# Patient Record
Sex: MALE | Race: WHITE | Employment: OTHER | URBAN - METROPOLITAN AREA
[De-identification: names, ages, dates, MRNs, and addresses within clinical notes are randomized per-mention and may not be internally consistent; named-entity substitution may affect disease eponyms.]

---

## 2017-11-27 ENCOUNTER — HOSPITAL ENCOUNTER (INPATIENT)
Age: 72
LOS: 1 days | Discharge: HOME | DRG: 065 | End: 2017-12-01
Attending: EMERGENCY MEDICINE | Admitting: INTERNAL MEDICINE
Payer: MEDICARE

## 2017-11-27 ENCOUNTER — APPOINTMENT (OUTPATIENT)
Dept: GENERAL RADIOLOGY | Age: 72
DRG: 065 | End: 2017-11-27
Attending: EMERGENCY MEDICINE
Payer: MEDICARE

## 2017-11-27 ENCOUNTER — APPOINTMENT (OUTPATIENT)
Dept: CT IMAGING | Age: 72
DRG: 065 | End: 2017-11-27
Attending: EMERGENCY MEDICINE
Payer: MEDICARE

## 2017-11-27 DIAGNOSIS — G45.9 TRANSIENT CEREBRAL ISCHEMIA, UNSPECIFIED TYPE: Primary | ICD-10-CM

## 2017-11-27 PROBLEM — I10 HTN (HYPERTENSION): Chronic | Status: ACTIVE | Noted: 2017-11-27

## 2017-11-27 PROBLEM — Z79.01 CHRONIC ANTICOAGULATION: Chronic | Status: ACTIVE | Noted: 2017-11-27

## 2017-11-27 PROBLEM — R79.89 ELEVATED SERUM CREATININE: Status: ACTIVE | Noted: 2017-11-27

## 2017-11-27 PROBLEM — I50.9 CHF (CONGESTIVE HEART FAILURE) (HCC): Chronic | Status: ACTIVE | Noted: 2017-11-27

## 2017-11-27 LAB
ALBUMIN SERPL-MCNC: 3.9 G/DL (ref 3.5–5)
ALBUMIN/GLOB SERPL: 1.1 {RATIO} (ref 1.1–2.2)
ALP SERPL-CCNC: 99 U/L (ref 45–117)
ALT SERPL-CCNC: 25 U/L (ref 12–78)
ANION GAP SERPL CALC-SCNC: 8 MMOL/L (ref 5–15)
APTT PPP: 51.1 SEC (ref 22.1–32.5)
AST SERPL-CCNC: 20 U/L (ref 15–37)
BASOPHILS # BLD: 0 K/UL (ref 0–0.1)
BASOPHILS NFR BLD: 0 % (ref 0–1)
BILIRUB SERPL-MCNC: 0.8 MG/DL (ref 0.2–1)
BUN SERPL-MCNC: 20 MG/DL (ref 6–20)
BUN/CREAT SERPL: 14 (ref 12–20)
CALCIUM SERPL-MCNC: 9.7 MG/DL (ref 8.5–10.1)
CHLORIDE SERPL-SCNC: 105 MMOL/L (ref 97–108)
CO2 SERPL-SCNC: 29 MMOL/L (ref 21–32)
CREAT SERPL-MCNC: 1.4 MG/DL (ref 0.7–1.3)
DIFFERENTIAL METHOD BLD: ABNORMAL
EOSINOPHIL # BLD: 0 K/UL (ref 0–0.4)
EOSINOPHIL NFR BLD: 0 % (ref 0–7)
ERYTHROCYTE [DISTWIDTH] IN BLOOD BY AUTOMATED COUNT: 19.1 % (ref 11.5–14.5)
GLOBULIN SER CALC-MCNC: 3.6 G/DL (ref 2–4)
GLUCOSE BLD STRIP.AUTO-MCNC: 94 MG/DL (ref 65–100)
GLUCOSE BLD STRIP.AUTO-MCNC: 98 MG/DL (ref 65–100)
GLUCOSE SERPL-MCNC: 102 MG/DL (ref 65–100)
HCT VFR BLD AUTO: 41.8 % (ref 36.6–50.3)
HGB BLD-MCNC: 14.1 G/DL (ref 12.1–17)
INR BLD: 2.7 (ref 0.9–1.2)
INR PPP: 3.6 (ref 0.9–1.1)
LYMPHOCYTES # BLD: 2.5 K/UL (ref 0.8–3.5)
LYMPHOCYTES NFR BLD: 42 % (ref 12–49)
MCH RBC QN AUTO: 29.9 PG (ref 26–34)
MCHC RBC AUTO-ENTMCNC: 33.7 G/DL (ref 30–36.5)
MCV RBC AUTO: 88.6 FL (ref 80–99)
MONOCYTES # BLD: 0.5 K/UL (ref 0–1)
MONOCYTES NFR BLD: 8 % (ref 5–13)
NEUTS SEG # BLD: 3 K/UL (ref 1.8–8)
NEUTS SEG NFR BLD: 50 % (ref 32–75)
PLATELET # BLD AUTO: 198 K/UL (ref 150–400)
POTASSIUM SERPL-SCNC: 4.7 MMOL/L (ref 3.5–5.1)
PROT SERPL-MCNC: 7.5 G/DL (ref 6.4–8.2)
PROTHROMBIN TIME: 37.8 SEC (ref 9–11.1)
RBC # BLD AUTO: 4.72 M/UL (ref 4.1–5.7)
RBC MORPH BLD: ABNORMAL
SERVICE CMNT-IMP: NORMAL
SERVICE CMNT-IMP: NORMAL
SODIUM SERPL-SCNC: 142 MMOL/L (ref 136–145)
THERAPEUTIC RANGE,PTTT: ABNORMAL SECS (ref 58–77)
TROPONIN I SERPL-MCNC: <0.04 NG/ML
WBC # BLD AUTO: 6 K/UL (ref 4.1–11.1)
WBC MORPH BLD: ABNORMAL

## 2017-11-27 PROCEDURE — 36415 COLL VENOUS BLD VENIPUNCTURE: CPT | Performed by: EMERGENCY MEDICINE

## 2017-11-27 PROCEDURE — 99285 EMERGENCY DEPT VISIT HI MDM: CPT

## 2017-11-27 PROCEDURE — 80053 COMPREHEN METABOLIC PANEL: CPT | Performed by: EMERGENCY MEDICINE

## 2017-11-27 PROCEDURE — 85610 PROTHROMBIN TIME: CPT

## 2017-11-27 PROCEDURE — 93005 ELECTROCARDIOGRAM TRACING: CPT

## 2017-11-27 PROCEDURE — 85610 PROTHROMBIN TIME: CPT | Performed by: EMERGENCY MEDICINE

## 2017-11-27 PROCEDURE — 85025 COMPLETE CBC W/AUTO DIFF WBC: CPT | Performed by: EMERGENCY MEDICINE

## 2017-11-27 PROCEDURE — 85730 THROMBOPLASTIN TIME PARTIAL: CPT | Performed by: EMERGENCY MEDICINE

## 2017-11-27 PROCEDURE — 71010 XR CHEST PORT: CPT

## 2017-11-27 PROCEDURE — 82962 GLUCOSE BLOOD TEST: CPT

## 2017-11-27 PROCEDURE — 84484 ASSAY OF TROPONIN QUANT: CPT | Performed by: EMERGENCY MEDICINE

## 2017-11-27 PROCEDURE — 70450 CT HEAD/BRAIN W/O DYE: CPT

## 2017-11-27 PROCEDURE — 74011250637 HC RX REV CODE- 250/637: Performed by: INTERNAL MEDICINE

## 2017-11-27 PROCEDURE — 99218 HC RM OBSERVATION: CPT

## 2017-11-27 RX ORDER — LABETALOL HYDROCHLORIDE 5 MG/ML
10 INJECTION, SOLUTION INTRAVENOUS
Status: DISCONTINUED | OUTPATIENT
Start: 2017-11-27 | End: 2017-12-01 | Stop reason: HOSPADM

## 2017-11-27 RX ORDER — PROCHLORPERAZINE EDISYLATE 5 MG/ML
10 INJECTION INTRAMUSCULAR; INTRAVENOUS
Status: DISCONTINUED | OUTPATIENT
Start: 2017-11-27 | End: 2017-11-30 | Stop reason: CLARIF

## 2017-11-27 RX ORDER — HYDROMORPHONE HYDROCHLORIDE 1 MG/ML
0.5 INJECTION, SOLUTION INTRAMUSCULAR; INTRAVENOUS; SUBCUTANEOUS
Status: DISCONTINUED | OUTPATIENT
Start: 2017-11-27 | End: 2017-12-01 | Stop reason: HOSPADM

## 2017-11-27 RX ORDER — CHOLECALCIFEROL (VITAMIN D3) 125 MCG
2000 CAPSULE ORAL DAILY
COMMUNITY

## 2017-11-27 RX ORDER — SODIUM CHLORIDE 0.9 % (FLUSH) 0.9 %
5-10 SYRINGE (ML) INJECTION EVERY 8 HOURS
Status: DISCONTINUED | OUTPATIENT
Start: 2017-11-27 | End: 2017-12-01 | Stop reason: HOSPADM

## 2017-11-27 RX ORDER — ZOLPIDEM TARTRATE 5 MG/1
5 TABLET ORAL
Status: DISCONTINUED | OUTPATIENT
Start: 2017-11-27 | End: 2017-12-01 | Stop reason: HOSPADM

## 2017-11-27 RX ORDER — SODIUM CHLORIDE 0.9 % (FLUSH) 0.9 %
5-10 SYRINGE (ML) INJECTION AS NEEDED
Status: DISCONTINUED | OUTPATIENT
Start: 2017-11-27 | End: 2017-12-01 | Stop reason: HOSPADM

## 2017-11-27 RX ORDER — WARFARIN 2.5 MG/1
1.25 TABLET ORAL
Status: COMPLETED | OUTPATIENT
Start: 2017-11-27 | End: 2017-11-27

## 2017-11-27 RX ORDER — BISOPROLOL FUMARATE 5 MG/1
5 TABLET ORAL DAILY
COMMUNITY
End: 2017-12-13 | Stop reason: SDUPTHER

## 2017-11-27 RX ORDER — TORSEMIDE 10 MG/1
10 TABLET ORAL DAILY
COMMUNITY
End: 2017-12-13 | Stop reason: SDUPTHER

## 2017-11-27 RX ORDER — ACETAMINOPHEN 325 MG/1
650 TABLET ORAL
Status: DISCONTINUED | OUTPATIENT
Start: 2017-11-27 | End: 2017-12-01 | Stop reason: HOSPADM

## 2017-11-27 RX ORDER — VALSARTAN 80 MG/1
80 TABLET ORAL DAILY
COMMUNITY
End: 2017-12-01

## 2017-11-27 RX ORDER — WARFARIN 2.5 MG/1
1.25 TABLET ORAL
COMMUNITY
End: 2017-12-13 | Stop reason: SDUPTHER

## 2017-11-27 RX ORDER — WARFARIN 2.5 MG/1
2.5 TABLET ORAL
COMMUNITY
End: 2017-12-13 | Stop reason: SDUPTHER

## 2017-11-27 RX ORDER — SODIUM CHLORIDE 9 MG/ML
75 INJECTION, SOLUTION INTRAVENOUS CONTINUOUS
Status: DISCONTINUED | OUTPATIENT
Start: 2017-11-27 | End: 2017-11-30

## 2017-11-27 RX ORDER — SPIRONOLACTONE 25 MG/1
25 TABLET ORAL DAILY
COMMUNITY
End: 2017-12-01

## 2017-11-27 RX ORDER — OXYCODONE AND ACETAMINOPHEN 5; 325 MG/1; MG/1
1 TABLET ORAL
Status: DISCONTINUED | OUTPATIENT
Start: 2017-11-27 | End: 2017-12-01 | Stop reason: HOSPADM

## 2017-11-27 RX ORDER — PRAVASTATIN SODIUM 40 MG/1
40 TABLET ORAL
COMMUNITY
End: 2017-12-13 | Stop reason: SDUPTHER

## 2017-11-27 RX ORDER — SODIUM CHLORIDE 0.9 % (FLUSH) 0.9 %
5-10 SYRINGE (ML) INJECTION AS NEEDED
Status: DISCONTINUED | OUTPATIENT
Start: 2017-11-27 | End: 2017-11-27

## 2017-11-27 RX ORDER — ACETAMINOPHEN 325 MG/1
325 TABLET ORAL
Status: ON HOLD | COMMUNITY
End: 2020-01-30 | Stop reason: SDUPTHER

## 2017-11-27 RX ADMIN — WARFARIN SODIUM 1.25 MG: 2.5 TABLET ORAL at 23:53

## 2017-11-27 NOTE — IP AVS SNAPSHOT
[address] 
[phone] Patient: Lito Klein MRN: [id number] :1945 My Medications STOP taking these medications DIOVAN 80 mg tablet Generic drug:  valsartan  
   
  
 spironolactone 25 mg tablet Commonly known as:  ALDACTONE  
   
  
  
TAKE these medications as instructed Instructions Each Dose to Equal  
 Morning Noon Evening Bedtime  
 acetaminophen 325 mg tablet Commonly known as:  TYLENOL Your last dose was: Your next dose is: Take 325 mg by mouth every six (6) hours as needed for Pain. 325 mg  
    
   
   
   
  
 bisoprolol 5 mg tablet Commonly known as:  Alphonza Frame Your last dose was: Your next dose is: Take 5 mg by mouth daily. 5 mg  
    
   
   
   
  
 pravastatin 40 mg tablet Commonly known as:  PRAVACHOL Your last dose was: Your next dose is: Take 40 mg by mouth nightly. 40 mg  
    
   
   
   
  
 torsemide 10 mg tablet Commonly known as:  DEMADEX Your last dose was: Your next dose is: Take 10 mg by mouth daily. 10 mg  
    
   
   
   
  
 VITAMIN D3 2,000 unit Tab Generic drug:  cholecalciferol (vitamin D3) Your last dose was: Your next dose is: Take 2,000 Units by mouth daily. 2000 Units * warfarin 2.5 mg tablet Commonly known as:  COUMADIN Your last dose was: Your next dose is: Take 2.5 mg by mouth every Tuesday, Thursday, Saturday & . 2.5 mg TuThSaSu and 1.25 mg MWF  
 2.5 mg  
    
   
   
   
  
 * warfarin 2.5 mg tablet Commonly known as:  COUMADIN Your last dose was: Your next dose is: Take 1.25 mg by mouth every Monday, Wednesday, Friday.  2.5 mg TuThSaSu and 1.25 mg MWF  
 1.25 mg  
    
   
   
   
  
 * Notice: This list has 2 medication(s) that are the same as other medications prescribed for you. Read the directions carefully, and ask your doctor or other care provider to review them with you.

## 2017-11-28 PROBLEM — Z95.0 PACEMAKER: Chronic | Status: ACTIVE | Noted: 2017-11-28

## 2017-11-28 LAB
ANION GAP SERPL CALC-SCNC: 9 MMOL/L (ref 5–15)
ATRIAL RATE: 75 BPM
BUN SERPL-MCNC: 20 MG/DL (ref 6–20)
BUN/CREAT SERPL: 18 (ref 12–20)
CALCIUM SERPL-MCNC: 9.3 MG/DL (ref 8.5–10.1)
CALCULATED R AXIS, ECG10: 151 DEGREES
CALCULATED T AXIS, ECG11: -37 DEGREES
CHLORIDE SERPL-SCNC: 105 MMOL/L (ref 97–108)
CHOLEST SERPL-MCNC: 135 MG/DL
CO2 SERPL-SCNC: 25 MMOL/L (ref 21–32)
CREAT SERPL-MCNC: 1.11 MG/DL (ref 0.7–1.3)
DIAGNOSIS, 93000: NORMAL
ERYTHROCYTE [DISTWIDTH] IN BLOOD BY AUTOMATED COUNT: 19.1 % (ref 11.5–14.5)
GLUCOSE SERPL-MCNC: 104 MG/DL (ref 65–100)
HCT VFR BLD AUTO: 39.9 % (ref 36.6–50.3)
HDLC SERPL-MCNC: 24 MG/DL
HDLC SERPL: 5.6 {RATIO} (ref 0–5)
HGB BLD-MCNC: 12.8 G/DL (ref 12.1–17)
INR PPP: 3.1 (ref 0.9–1.1)
LDLC SERPL CALC-MCNC: 48.8 MG/DL (ref 0–100)
LIPID PROFILE,FLP: ABNORMAL
MAGNESIUM SERPL-MCNC: 2 MG/DL (ref 1.6–2.4)
MCH RBC QN AUTO: 28.8 PG (ref 26–34)
MCHC RBC AUTO-ENTMCNC: 32.1 G/DL (ref 30–36.5)
MCV RBC AUTO: 89.9 FL (ref 80–99)
PHOSPHATE SERPL-MCNC: 3.7 MG/DL (ref 2.6–4.7)
PLATELET # BLD AUTO: 182 K/UL (ref 150–400)
POTASSIUM SERPL-SCNC: 4.5 MMOL/L (ref 3.5–5.1)
PROTHROMBIN TIME: 32.1 SEC (ref 9–11.1)
Q-T INTERVAL, ECG07: 464 MS
QRS DURATION, ECG06: 192 MS
QTC CALCULATION (BEZET), ECG08: 518 MS
RBC # BLD AUTO: 4.44 M/UL (ref 4.1–5.7)
SODIUM SERPL-SCNC: 139 MMOL/L (ref 136–145)
TRIGL SERPL-MCNC: 311 MG/DL (ref ?–150)
VENTRICULAR RATE, ECG03: 75 BPM
VLDLC SERPL CALC-MCNC: 62.2 MG/DL
WBC # BLD AUTO: 6.2 K/UL (ref 4.1–11.1)

## 2017-11-28 PROCEDURE — 92523 SPEECH SOUND LANG COMPREHEN: CPT

## 2017-11-28 PROCEDURE — 85610 PROTHROMBIN TIME: CPT | Performed by: INTERNAL MEDICINE

## 2017-11-28 PROCEDURE — 84100 ASSAY OF PHOSPHORUS: CPT | Performed by: INTERNAL MEDICINE

## 2017-11-28 PROCEDURE — 99218 HC RM OBSERVATION: CPT

## 2017-11-28 PROCEDURE — 85027 COMPLETE CBC AUTOMATED: CPT | Performed by: INTERNAL MEDICINE

## 2017-11-28 PROCEDURE — 80048 BASIC METABOLIC PNL TOTAL CA: CPT | Performed by: INTERNAL MEDICINE

## 2017-11-28 PROCEDURE — 97162 PT EVAL MOD COMPLEX 30 MIN: CPT

## 2017-11-28 PROCEDURE — 74011250637 HC RX REV CODE- 250/637: Performed by: INTERNAL MEDICINE

## 2017-11-28 PROCEDURE — 96374 THER/PROPH/DIAG INJ IV PUSH: CPT

## 2017-11-28 PROCEDURE — 93880 EXTRACRANIAL BILAT STUDY: CPT

## 2017-11-28 PROCEDURE — 83735 ASSAY OF MAGNESIUM: CPT | Performed by: INTERNAL MEDICINE

## 2017-11-28 PROCEDURE — 74011250636 HC RX REV CODE- 250/636: Performed by: INTERNAL MEDICINE

## 2017-11-28 PROCEDURE — 36415 COLL VENOUS BLD VENIPUNCTURE: CPT | Performed by: INTERNAL MEDICINE

## 2017-11-28 PROCEDURE — 97530 THERAPEUTIC ACTIVITIES: CPT

## 2017-11-28 PROCEDURE — 80061 LIPID PANEL: CPT | Performed by: INTERNAL MEDICINE

## 2017-11-28 RX ORDER — TORSEMIDE 20 MG/1
10 TABLET ORAL DAILY
Status: DISCONTINUED | OUTPATIENT
Start: 2017-11-28 | End: 2017-11-29

## 2017-11-28 RX ORDER — WARFARIN 2.5 MG/1
2.5 TABLET ORAL ONCE
Status: COMPLETED | OUTPATIENT
Start: 2017-11-28 | End: 2017-11-28

## 2017-11-28 RX ORDER — PRAVASTATIN SODIUM 20 MG/1
40 TABLET ORAL
Status: DISCONTINUED | OUTPATIENT
Start: 2017-11-28 | End: 2017-12-01 | Stop reason: HOSPADM

## 2017-11-28 RX ORDER — SPIRONOLACTONE 25 MG/1
25 TABLET ORAL DAILY
Status: DISCONTINUED | OUTPATIENT
Start: 2017-11-28 | End: 2017-12-01

## 2017-11-28 RX ORDER — VALSARTAN 80 MG/1
80 TABLET ORAL DAILY
Status: DISCONTINUED | OUTPATIENT
Start: 2017-11-28 | End: 2017-11-29

## 2017-11-28 RX ORDER — BISOPROLOL FUMARATE 5 MG/1
5 TABLET ORAL DAILY
Status: DISCONTINUED | OUTPATIENT
Start: 2017-11-28 | End: 2017-12-01 | Stop reason: HOSPADM

## 2017-11-28 RX ADMIN — VALSARTAN 80 MG: 80 TABLET ORAL at 09:50

## 2017-11-28 RX ADMIN — Medication 10 ML: at 21:22

## 2017-11-28 RX ADMIN — PRAVASTATIN SODIUM 40 MG: 20 TABLET ORAL at 21:20

## 2017-11-28 RX ADMIN — BISOPROLOL FUMARATE 5 MG: 5 TABLET, COATED ORAL at 09:50

## 2017-11-28 RX ADMIN — SPIRONOLACTONE 25 MG: 25 TABLET ORAL at 09:50

## 2017-11-28 RX ADMIN — SODIUM CHLORIDE 75 ML/HR: 900 INJECTION, SOLUTION INTRAVENOUS at 01:11

## 2017-11-28 RX ADMIN — Medication 10 ML: at 01:10

## 2017-11-28 RX ADMIN — WARFARIN SODIUM 2.5 MG: 2.5 TABLET ORAL at 21:20

## 2017-11-28 RX ADMIN — TORSEMIDE 10 MG: 20 TABLET ORAL at 09:50

## 2017-11-28 RX ADMIN — PRAVASTATIN SODIUM 40 MG: 20 TABLET ORAL at 01:49

## 2017-11-28 NOTE — H&P
[address] 19  [phone]    Admission History and Physical      NAME:  Jhoana Houser   :   1945   MRN:  [id number]     PCP:  Areli Bright MD     Date/Time:  2017         Subjective:     CHIEF COMPLAINT: Aphasia     HISTORY OF PRESENT ILLNESS:     Mr. Rudolph Shepherd is a 73 y.o.  male who is admitted with TIA. Mr. Rudolph Shepherd presented to the Emergency Department this PM complaining of aphasia: sudden onset, this evening, constant, severe, unable to get any intelligible words out, lasted 2+ hours. History obtained from spouse, chart review and the patient. Past Medical History:   Diagnosis Date    Arthritis     Cancer ([address]     Chronic anticoagulation 2017    Heart failure (HCC)         Past Surgical History:   Procedure Laterality Date    CARDIAC SURG PROCEDURE UNLIST      HX PACEMAKER         Social History   Substance Use Topics    Smoking status: Never Smoker    Smokeless tobacco: Never Used    Alcohol use No        Family History   Problem Relation Age of Onset    Cancer Father         No Known Allergies     Prior to Admission medications    Medication Sig Start Date End Date Taking? Authorizing Provider   valsartan (DIOVAN) 80 mg tablet Take 80 mg by mouth daily. Yes Historical Provider   bisoprolol (ZEBETA) 5 mg tablet Take 5 mg by mouth daily. Yes Historical Provider   cholecalciferol, vitamin D3, (VITAMIN D3) 2,000 unit tab Take 2,000 Units by mouth daily. Yes Historical Provider   spironolactone (ALDACTONE) 25 mg tablet Take 25 mg by mouth daily. Yes Historical Provider   torsemide (DEMADEX) 10 mg tablet Take 10 mg by mouth daily. Yes Historical Provider   pravastatin (PRAVACHOL) 40 mg tablet Take 40 mg by mouth nightly. Yes Historical Provider   warfarin (COUMADIN) 2.5 mg tablet Take 2.5 mg by mouth every Tuesday, Thursday, Saturday & .  2.5 mg TuThSaSu and 1.25 mg MWF   Yes Historical Provider   warfarin (COUMADIN) 2.5 mg tablet Take 1.25 mg by mouth every Monday, Wednesday, Friday. 2.5 mg TuThSaSu and 1.25 mg MWF   Yes Historical Provider   acetaminophen (TYLENOL) 325 mg tablet Take 325 mg by mouth every six (6) hours as needed for Pain.    Yes Historical Provider         Review of Systems:  (bold if positive, if negative)    Gen:  Eyes:  ENT:  CVS:  Pulm:  dyspneaGI:    :    MS:  Skin:  Psych:  Endo:    Hem:  Renal:    Neuro:            Objective:      VITALS:    Vital signs reviewed; most recent are:    Visit Vitals    /75    Pulse 73    Resp 19    Ht 6' 1\" (1.854 m)    Wt 88.9 kg (196 lb)    SpO2 95%    BMI 25.86 kg/m2     SpO2 Readings from Last 6 Encounters:   11/27/17 95%        No intake or output data in the 24 hours ending 11/27/17 2241         Exam:     Physical Exam:    Gen: Well-developed, well-nourished, frail, elderly, chronically ill-appearing, in no acute distress  HEENT:  Pink conjunctivae, PERRL, hearing intact to voice, moist mucous membranes  Neck: Supple, without masses, thyroid non-tender  Resp: No accessory muscle use, clear breath sounds without wheezes rales or rhonchi  Card: No murmurs, normal S1, S2 without thrills, bruits or peripheral edema  Abd:  Soft, non-tender, non-distended, normoactive bowel sounds are present, no palpable organomegaly and no detectable hernias  Lymph:  No cervical or inguinal adenopathy  Musc: No cyanosis or clubbing  Skin: No rashes or ulcers, skin turgor is good  Neuro:  Cranial nerves are grossly intact, no focal motor weakness, follows commands appropriately  Psych:  Fair insight, oriented to person, place and time, alert             Labs:    Recent Labs      11/27/17 2024   WBC  6.0   HGB  14.1   HCT  41.8   PLT  198     Recent Labs      11/27/17 2024   NA  142   K  4.7   CL  105   CO2  29   GLU  102*   BUN  20   CREA  1.40*   CA  9.7   ALB  3.9   TBILI  0.8   SGOT  20   ALT  25     Lab Results   Component Value Date/Time    Glucose (POC) 98 11/27/2017 08:23 PM    Glucose (POC) 94 11/27/2017 08:08 PM     No results for input(s): PH, PCO2, PO2, HCO3, FIO2 in the last 72 hours. Recent Labs      11/27/17 2025   INR  2.7*       Additional testing:  Chest X-ray: no acute process, visualized by me.   Results not reviewed with Radiologist.    EKG - V-paced, underlying rhythm likely afib       Assessment/Plan:       Principal Problem:    TIA (transient ischemic attack) (11/27/2017)   - admit for TIA work up   - he is already anticoagulated and on a statin so there is likely little we can add to his therapy   - carotid dopplers, echocardiogram, check lipids; can't have MRI due to pacer/valve   - PT/OT/Speech   - Neurology to see    Active Problems:    CHF (congestive heart failure) ([facility] (11/27/2017)   - unknown type, echo as above, continue home meds      HTN (hypertension) (11/27/2017)   - BP okay, follow on home meds      Elevated serum creatinine (11/27/2017)   - denies knowledge of any CKD   - no prior labs here but I suspect this is old   - recheck in AM      Chronic anticoagulation (11/27/2017)   - on warfarin for artificial valve and afib   - INR therapeutic   - follow on warfarin per protocol       Code status:   - patient is FULL CODE and his wife is his surrogate      Total time spent with patient: [address] discussed with: Patient, Family, Nursing Staff and Dr. Braulio Zamorano    Discussed:  Code Status, Care Plan and D/C Planning    Prophylaxis:  Coumadin and SCD's    Probable Disposition:  Home w/Family           ___________________________________________________    Attending Physician: Alton Kumar MD

## 2017-11-28 NOTE — CONSULTS
Yonny Cabello MD. [facility]              Patient: Roxana Patel  : 1945      Today's Date: 2017          HISTORY OF PRESENT ILLNESS:     History of Present Illness:  Reason for consult: tachy-donal, new pacer    Mr. Kelin Barrios is a 74 yo male with PMH of mechanical AVR, systolic CHF, BiV ICD, Afib, HTN who is brought to ER by his wife and son-in-law with chief complaint of aphasia. Per wife, confusion, aphasia and nonsensical speech onset acutely at [address] on 17. Pt reported difficulty getting his words out accompanied by decreased spatial awareness. Pt is anti-coagulated on Coumadin. On 17 he is doing better, but not at baseline. 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 Mr. Jeancarlos Le is a poor historian and data obtained from wife. He apparently has had GONZALES just walking 10 feet for a long time. Cardiologists in [place] were adjusting meds for CHF. He did not feel better after [facility] even. No CP.        PAST MEDICAL HISTORY:     Past Medical History:   Diagnosis Date    Aneurysm, thoracic aortic (HCC)     Arthritis     Atrial fibrillation (HCC)     PAF    Cancer (HCC)     Cardiomyopathy (HCC)     Chronic anticoagulation 2017    GI bleed     GI bleed     HTN (hypertension)     Pacemaker 2017    Medtronic BiV ICD    S/P AVR (aortic valve replacement)     mechanical AVR    Systolic heart failure (HCC)        Past Surgical History:   Procedure Laterality Date    CARDIAC SURG PROCEDURE UNLIST      HX PACEMAKER             MEDICATIONS:     Current Facility-Administered Medications   Medication Dose Route Frequency Provider Last Rate Last Dose    bisoprolol (ZEBETA) tablet 5 mg  5 mg Oral DAILY Alton Kumar MD   5 mg at 17 0950    pravastatin (PRAVACHOL) tablet 40 mg  40 mg Oral QHS Alton Kumar MD   40 mg at 17 0149    spironolactone (ALDACTONE) tablet 25 mg  25 mg Oral DAILY Alton Kumar MD   25 mg at 17 0950    torsemide (DEMADEX) tablet 10 mg  10 mg Oral DAILY Anup De Souza MD   10 mg at 11/28/17 0950    valsartan (DIOVAN) tablet 80 mg  80 mg Oral DAILY Anup De Souza MD   80 mg at 11/28/17 0950    warfarin (COUMADIN) tablet 2.5 mg  2.5 mg Oral ONCE Anup De Souza MD        labetalol (NORMODYNE;TRANDATE) injection 10 mg  10 mg IntraVENous Q4H PRN Anup De Souza MD        0.9% sodium chloride infusion  75 mL/hr IntraVENous CONTINUOUS Anup De Souza MD 75 mL/hr at 11/28/17 0111 75 mL/hr at 11/28/17 0111    sodium chloride (NS) flush 5-10 mL  5-10 mL IntraVENous Q8H Anup De Souza MD   10 mL at 11/28/17 0110    sodium chloride (NS) flush 5-10 mL  5-10 mL IntraVENous PRN Anup De Souza MD        acetaminophen (TYLENOL) tablet 650 mg  650 mg Oral Q4H PRN Anup De Souza MD        oxyCODONE-acetaminophen (PERCOCET) 5-325 mg per tablet 1 Tab  1 Tab Oral Q4H PRN Anup De Souza MD        HYDROmorphone (PF) (DILAUDID) injection 0.5 mg  0.5 mg IntraVENous Q4H PRN Anup De Souza MD        prochlorperazine (COMPAZINE) injection 10 mg  10 mg IntraVENous Q6H PRN Anup De Souza MD        zolpidem [facility]) tablet 5 mg  5 mg Oral QHS PRN Anup De Souza MD        Warfarin - Pharmacy to dose   Other Rx Dosing/Monitoring Anup De Souza MD         Current Outpatient Prescriptions   Medication Sig Dispense Refill    valsartan (DIOVAN) 80 mg tablet Take 80 mg by mouth daily.  bisoprolol (ZEBETA) 5 mg tablet Take 5 mg by mouth daily.  cholecalciferol, vitamin D3, (VITAMIN D3) 2,000 unit tab Take 2,000 Units by mouth daily.  spironolactone (ALDACTONE) 25 mg tablet Take 25 mg by mouth daily.  torsemide (DEMADEX) 10 mg tablet Take 10 mg by mouth daily.  pravastatin (PRAVACHOL) 40 mg tablet Take 40 mg by mouth nightly.  warfarin (COUMADIN) 2.5 mg tablet Take 2.5 mg by mouth every Tuesday, Thursday, Saturday & Sunday.  2.5 mg TuThSaSu and 1.25 mg MWF      warfarin (COUMADIN) 2.5 mg tablet Take 1.25 mg by mouth every Monday, Wednesday, Friday. 2.5 mg TuThSaSu and 1.25 mg MWF      acetaminophen (TYLENOL) 325 mg tablet Take 325 mg by mouth every six (6) hours as needed for Pain. No Known Allergies          SOCIAL HISTORY:     Social History   Substance Use Topics    Smoking status: Never Smoker    Smokeless tobacco: Never Used    Alcohol use No           FAMILY HISTORY:     Family History   Problem Relation Age of Onset    Cancer Father              REVIEW OF SYMPTOMS:     Review of Symptoms:  Constitutional: Negative for fever   HEENT: Negative for nosebleeds   Respiratory: + GONZALES  Cardiovascular: Negative for  , syncope   Gastrointestinal: Negative for abdominal pain, melena. Genitourinary: Negative for dysuria  Musculoskeletal: Negative for myalgias. Skin: Negative for rash  Heme: No problems bleeding. Neurological: + aphasia           PHYSICAL EXAM:     Physical Exam:  Visit Vitals    /73 (BP 1 Location: Left arm, BP Patient Position: At rest;Supine)    Pulse (!) 112    Temp 97.8 °F (36.6 °C)    Resp 23    Ht 6' 1\" (1.854 m)    Wt 196 lb (88.9 kg)    SpO2 98%    BMI 25.86 kg/m2     Patient in NAD. Seems confused. HEENT:  Hearing intact, non-icteric, normocephalic, atraumatic. Neck Exam: Supple, No carotid bruits. Slight JVD sitting up. Lung Exam: Clear to auscultation, even breath sounds. Cardiac Exam: Regular rate and rhythm normal mechanical clicks with 1/6 systolic murmur  Abdomen: Soft, non-tender, normal bowel sounds. Extremities: Moves all ext well. Trace lower extremity edema. Vascular: 2+ dorsalis pedis pulses bilaterally.   Psych: Appropriate affect  Neuro - Grossly intact        LABS / OTHER STUDIES:       Recent Results (from the past 24 hour(s))   GLUCOSE, POC    Collection Time: 11/27/17  8:08 PM   Result Value Ref Range    Glucose (POC) 94 65 - 100 mg/dL    Performed by  CH Mack, POC    Collection Time: 11/27/17  8:23 PM   Result Value Ref Range    Glucose (POC) 98 65 - 100 mg/dL    Performed by Delfina Lees    CBC WITH AUTOMATED DIFF    Collection Time: 11/27/17  8:24 PM   Result Value Ref Range    WBC 6.0 4.1 - 11.1 K/uL    RBC 4.72 4.10 - 5.70 M/uL    HGB 14.1 12.1 - 17.0 g/dL    HCT 41.8 36.6 - 50.3 %    MCV 88.6 80.0 - 99.0 FL    MCH 29.9 26.0 - 34.0 PG    MCHC 33.7 30.0 - 36.5 g/dL    RDW 19.1 (H) 11.5 - 14.5 %    PLATELET 277 294 - 200 K/uL    NEUTROPHILS 50 32 - 75 %    LYMPHOCYTES 42 12 - 49 %    MONOCYTES 8 5 - 13 %    EOSINOPHILS 0 0 - 7 %    BASOPHILS 0 0 - 1 %    ABS. NEUTROPHILS 3.0 1.8 - 8.0 K/UL    ABS. LYMPHOCYTES 2.5 0.8 - 3.5 K/UL    ABS. MONOCYTES 0.5 0.0 - 1.0 K/UL    ABS. EOSINOPHILS 0.0 0.0 - 0.4 K/UL    ABS. BASOPHILS 0.0 0.0 - 0.1 K/UL    DF MANUAL      RBC COMMENTS ANISOCYTOSIS  1+        RBC COMMENTS OVALOCYTES  PRESENT        RBC COMMENTS TEARDROP CELLS  PRESENT        WBC COMMENTS REACTIVE LYMPHS     METABOLIC PANEL, COMPREHENSIVE    Collection Time: 11/27/17  8:24 PM   Result Value Ref Range    Sodium 142 136 - 145 mmol/L    Potassium 4.7 3.5 - 5.1 mmol/L    Chloride 105 97 - 108 mmol/L    CO2 29 21 - 32 mmol/L    Anion gap 8 5 - 15 mmol/L    Glucose 102 (H) 65 - 100 mg/dL    BUN 20 6 - 20 MG/DL    Creatinine 1.40 (H) 0.70 - 1.30 MG/DL    BUN/Creatinine ratio 14 12 - 20      GFR est AA >60 >60 ml/min/1.73m2    GFR est non-AA 50 (L) >60 ml/min/1.73m2    Calcium 9.7 8.5 - 10.1 MG/DL    Bilirubin, total 0.8 0.2 - 1.0 MG/DL    ALT (SGPT) 25 12 - 78 U/L    AST (SGOT) 20 15 - 37 U/L    Alk.  phosphatase 99 45 - 117 U/L    Protein, total 7.5 6.4 - 8.2 g/dL    Albumin 3.9 3.5 - 5.0 g/dL    Globulin 3.6 2.0 - 4.0 g/dL    A-G Ratio 1.1 1.1 - 2.2     TROPONIN I    Collection Time: 11/27/17  8:24 PM   Result Value Ref Range    Troponin-I, Qt. <0.04 <0.05 ng/mL   PROTHROMBIN TIME + INR    Collection Time: 11/27/17  8:24 PM   Result Value Ref Range    INR 3.6 (H) 0.9 - 1.1      Prothrombin time 37.8 (H) 9.0 - 11.1 sec   PTT Collection Time: 11/27/17  8:24 PM   Result Value Ref Range    aPTT 51.1 (H) 22.1 - 32.5 sec    aPTT, therapeutic range     58.0 - 77.0 SECS   POC INR    Collection Time: 11/27/17  8:25 PM   Result Value Ref Range    INR (POC) 2.7 (H) <1.2     EKG, 12 LEAD, INITIAL    Collection Time: 11/27/17  8:29 PM   Result Value Ref Range    Ventricular Rate 75 BPM    Atrial Rate 75 BPM    QRS Duration 192 ms    Q-T Interval 464 ms    QTC Calculation (Bezet) 518 ms    Calculated R Axis 151 degrees    Calculated T Axis -37 degrees    Diagnosis       Ventricular-paced rhythm  Abnormal ECG  No previous ECGs available     PROTHROMBIN TIME + INR    Collection Time: 11/28/17  5:04 AM   Result Value Ref Range    INR 3.1 (H) 0.9 - 1.1      Prothrombin time 32.1 (H) 9.0 - 11.1 sec   CBC W/O DIFF    Collection Time: 11/28/17  5:04 AM   Result Value Ref Range    WBC 6.2 4.1 - 11.1 K/uL    RBC 4.44 4.10 - 5.70 M/uL    HGB 12.8 12.1 - 17.0 g/dL    HCT 39.9 36.6 - 50.3 %    MCV 89.9 80.0 - 99.0 FL    MCH 28.8 26.0 - 34.0 PG    MCHC 32.1 30.0 - 36.5 g/dL    RDW 19.1 (H) 11.5 - 14.5 %    PLATELET 343 189 - 902 K/uL   MAGNESIUM    Collection Time: 11/28/17  5:04 AM   Result Value Ref Range    Magnesium 2.0 1.6 - 2.4 mg/dL   METABOLIC PANEL, BASIC    Collection Time: 11/28/17  5:04 AM   Result Value Ref Range    Sodium 139 136 - 145 mmol/L    Potassium 4.5 3.5 - 5.1 mmol/L    Chloride 105 97 - 108 mmol/L    CO2 25 21 - 32 mmol/L    Anion gap 9 5 - 15 mmol/L    Glucose 104 (H) 65 - 100 mg/dL    BUN 20 6 - 20 MG/DL    Creatinine 1.11 0.70 - 1.30 MG/DL    BUN/Creatinine ratio 18 12 - 20      GFR est AA >60 >60 ml/min/1.73m2    GFR est non-AA >60 >60 ml/min/1.73m2    Calcium 9.3 8.5 - 10.1 MG/DL   PHOSPHORUS    Collection Time: 11/28/17  5:04 AM   Result Value Ref Range    Phosphorus 3.7 2.6 - 4.7 MG/DL   LIPID PANEL    Collection Time: 11/28/17  5:04 AM   Result Value Ref Range    LIPID PROFILE          Cholesterol, total 135 <200 MG/DL Triglyceride 311 (H) <150 MG/DL    HDL Cholesterol 24 MG/DL    LDL, calculated 48.8 0 - 100 MG/DL    VLDL, calculated 62.2 MG/DL    CHOL/HDL Ratio 5.6 (H) 0 - 5.0               CARDIAC DIAGNOSTICS:     Cardiac Evaluation Includes:    EKG 11/27/17 - V paced, PVC's    ABELARDO/DCC 8/11/17 - LVEF 50-55%; Severe RV dilation and mod dysfunction. PFO with mod shunting. Normal mechanical AVR (mean gradient 4 mmHg), mod MR. Cardioversion to NSR at end of study. Echo 8/30/17 - TDS. Limited study. LVEF 50-55%, Mod RV dilation with RV dysfunction, CHARLIE, RVSP 37     CT Head 11/27/17 - no acute abn   Carotid Doppler 11/28/17 - 0-49% stenosis bilat       ASSESSMENT AND PLAN:     Assessment and Plan:  1) History of Afib s/p ABELARDO/DCC (8/17) and BiV Pacer   - HR on tele seems to be OK (lots of artifact is giving falsely elevated HR reading on the monitor)  - continue bisporolol  - will interrogate device tomorrow   - cont OAC    2) Chronic CHF (history of HFrEF -improved)  - volume status is probably fair for him, but wife notes a long history of class III symptoms   - continue oral diuretics (wife does not want to increase diuretics tonight)   - cont bisporolol and diovan  - Has a BiV ICD  - check an echo     3) Mechanical AVR  - cont coumadin  - He was not on an ASA on arrival (did have GI bleed 2/17)     4) TIA/CVA  - per neuro     5) HTN  - BP up and down per wife - will follow      Gildardo Denver, MD, [facility]  [address] [facility] [address], [address] [facility]  [address], [facility] [address], [address] [place] [address]  Ph: [phone]   Ph [phone]

## 2017-11-28 NOTE — ED NOTES
Family member at bedside states pt got up from the bed and appeared very confused. She states that pt was looking under the bed and around the room and when asked what he was doing stated that he wasn't sure. Family member then needed assistance to get pt back into bed. Pt appeared slightly confused but could answer questions appropriately.

## 2017-11-28 NOTE — PROGRESS NOTES
Occupational therapy note:  1230pm.- Spoke with PT who just evaluated patient and reports patient BP decreasing during evaluation, in conjunction with HR varying between 40s-160s. Patient reports minimal symptoms of changes, but currently HR at 160 at rest in bed. Will hold OT evaluation at this time and follow up with patient later. Kathy Gupta, MS OTR/L

## 2017-11-28 NOTE — PROGRESS NOTES
Problem: Communication Impaired (Adult)  Goal: *Acute Goals and Plan of Care (Insert Text)  Communication goals initiated 17:  1)  Confrontation namin items in 1 min x3 100% by 12=5=17  2)  Compare/contrast items with intact semantics and syntax by 17  3)  Answer y/n ?'s about paragraph length material 90% by 17  4) functional reading 90% by 17  Speech LAnguage Pathology evaluation  Patient: Leila Cazares (73 y.o. male)  Date: 2017  Primary Diagnosis: TIA (transient ischemic attack)        Precautions: aspiration       ASSESSMENT :  Based on the objective data described below, the patient presents with mild receptive-expressive aphasia. He described an instance of speech apraxia and word-retrieval issues prior to admission that lasted 2 hours. He was admitted with TIA. Evans Jackson Noted L facial droop. He passed the STANd and has no patient or RN c/o dysphagia. .    Patient will benefit from skilled intervention to address the above impairments. Patients rehabilitation potential is considered to be Good  Factors which may influence rehabilitation potential include:   []              None noted  []              Mental ability/status  []              Medical condition  []              Home/family situation and support systems  []              Safety awareness  []              Pain tolerance/management  []              Other:      PLAN :  Recommendations and Planned Interventions:  Needs f/u SLP therapy. Frequency/Duration: Patient will be followed by speech-language pathology 2 times a week to address goals. Discharge Recommendations: Outpatient     SUBJECTIVE:   Patient stated I knew what I wanted to say, but I couldn't and it was scary. .    OBJECTIVE:     Past Medical History:   Diagnosis Date    Arthritis     [facility] ([facility]     Chronic anticoagulation 2017    Heart failure ([facility]     Pacemaker 2017    -- unsure if MRI compatible     Past Surgical History:   Procedure Laterality Date    CARDIAC SURG PROCEDURE UNLIST      HX PACEMAKER       Prior Level of Function/Home Situation: recently moved to [place] (12 days ago)  Home Situation  Home Environment: Private residence  # Steps to Enter: 4  Rails to Enter: Yes  Hand Rails : Bilateral  One/Two Story Residence: Two story, live on 1st floor  Living Alone: No  Support Systems: Spouse/Significant Other/Partner  Patient Expects to be Discharged to[de-identified] Private residence  Current DME Used/Available at Home: aiden Estrada  Mental Status:  Neurologic State: Alert  Orientation Level: Oriented X4  Cognition: Decreased command following        Safety/Judgement: Decreased insight into deficits  Motor Speech:   WFL. No dysarthria or speech apraxia at this time  Did not mild-moderate L facial droop with ROM. Language Comprehension and Expression:  Auditory Comprehension  Auditory Impairment: Yes  Response to Basic Yes/No Questions (%): 90 %  Response to Moderately Complex Yes/No Questions (%): 90 %  Response to Complex Yes/No Questions (%) : 90 %  There was a mild delay noted in processing most ?s'  One-Step Basic Commands (%): 90 %  Two-Step Basic Commands (%): 90 %  Three-Step Basic Commands (%): 0 % He had overflow of actions   Follows Conversation: Impaired (%) (mild)  Effective Techniques: Extra processing time;Repetition; Slowed speech;Stressing words  Verbal Expression  Primary Mode of Expression: Verbal      confrontation namin% but with delays. Divergent naming: 15 for animals, but 2 for fruits. Inconsistent due to word-retrieval issues. READING COMPREHENSION:     WNL for sentence level. Neuro-Linguistics:                                                  Pragmatics:        Voice:                                                   G Codes:   In compliance with CMSs Claims Based Outcome Reporting, the following G-code set was chosen for this patient based the use of the NOMS functional outcome to quantify this patient's level of expressive language impairment. Using the NOMS, the patient was determined to be at level 5 for expressive language which correlates with the CJ= 20-39% level of severity. Based on the objective assessment provided within this note, the current, goal, and discharge g-codes are as follows:    Expression   Current  CJ= 20-39%   Goals  CI= 1-19%    NOMS: The NOMS functional outcome measure was used to quantify this patient's level of expressive language impairment. Based on the NOMS, the patient was determined to be at level 5 for spoken language expression. NOMS Spoken Language Expression:  Level 1 (CN): Verbalizations not meaningful to anyone. Level 2 (CM): Few attempts accurate/appropriate. Max cues to elicit automatic/imitative words/phrases. Level 3 (CL): Communication partner responsible for communication; Mod cues for words/phrases meaningful in context  Level 4 (CK): Initiate during simple, routine activities w/familiar partner. Mod cues to produce simple sentences  Level 5 (Adán Lora): Initiates communication with familiar and unfamiliar partners. Min cues for complex sentences. Level 6 (CI): Communicates for most activities. Some limitations still present for vocational/social activities. Rarely cued for complex info  Level 7 [facility]): Independent communication. RON (2003). National Outcomes Measurement System (NOMS): Adult Speech-Language Pathology User's Guide.          Pain:  Pain Scale 1: Numeric (0 - 10)  Pain Intensity 1: 0     After treatment:   []              Patient left in no apparent distress sitting up in chair  []              Patient left in no apparent distress in bed  []              Call bell left within reach  []              Nursing notified  []              Caregiver present  []              Bed alarm activated    COMMUNICATION/EDUCATION:   The patients plan of care including recommendations and planned interventions was discussed with: Physical Therapist, Occupational Therapist and Registered Nurse. Patient was educated regarding His  deficit(s) of expressive aphasia as this relates to His diagnosis of TIA/CVA. He demonstrated Good understanding as evidenced by discussion. Some impaired awareness of deficits. .  []  Patient/family have participated as able in goal setting and plan of care. [x]  Patient/family agree to work toward stated goals and plan of care. [x]  Patient understands intent and goals of therapy, but is neutral about his/her participation. []  Patient is unable to participate in goal setting and plan of care.     Thank you for this referral.  Marvin Her, SLP  Time Calculation: 20 mins

## 2017-11-28 NOTE — ED NOTES
Assumed care of pt from [facility]. Introduced self to pt as primary RN and explained plan of care from this point on. Pt resting comfortably, denies any pain or distress at this time.

## 2017-11-28 NOTE — PROGRESS NOTES
Bedside and Verbal shift change report given to Giovani Serrato (oncoming nurse) by Matthew Fernandez (offgoing nurse). Report included the following information SBAR, Kardex, Intake/Output, MAR and Recent Results.

## 2017-11-28 NOTE — ED TRIAGE NOTES
Pt's wife reports loss of words and difficulty speaking in the last 40 minutes. Pt is having trouble with \"word finding\"    No known injury. No notable weakness. No facial droop.

## 2017-11-28 NOTE — PROCEDURES
[facility]  *** FINAL REPORT ***    Name: Karlo Coe  MRN: [id number]    Outpatient  : 27 Dec 1945  HIS Order #: [id number]  [id number] [facility] Visit #: [id number]  Date: 2017    TYPE OF TEST: Cerebrovascular Duplex    REASON FOR TEST  Aphasia    Right Carotid:-             Proximal               Mid                 Distal  cm/s  Systolic  Diastolic  Systolic  Diastolic  Systolic  Diastolic  CCA:     63.3      12.0                            44.8       9.9  Bulb:  ECA:     64.7       0.0  ICA:     44.4      13.9       61.6      18.8       52.0      11.0  ICA/CCA:  1.0       1.4    ICA Stenosis: <50%    Right Vertebral:-  Finding: Antegrade  Sys:       52.0  Rissa:       16.2    Right Subclavian:    Left Carotid:-            Proximal                Mid                 Distal  cm/s  Systolic  Diastolic  Systolic  Diastolic  Systolic  Diastolic  CCA:     87.9      13.0                            69.4      15.0  Bulb:  ECA:     65.1      10.1  ICA:     42.4       9.7       43.8      16.8       52.4      17.5  ICA/CCA:  0.6       0.6    ICA Stenosis: <50%    Left Vertebral:-  Finding: Antegrade  Sys:       31.0  Rissa:        5.3    Left Subclavian:    INTERPRETATION/FINDINGS  PROCEDURE:  Evaluation of the extracranial cerebrovascular arteries  with ultrasound (B-mode imaging, pulsed Doppler, color Doppler). Includes the common carotid, internal carotid, external carotid, and  vertebral arteries. FINDINGS: Mild Homogeneous plaque in the bulb and right ICA. Mild  Irregular hyperechoic plaque in the bulb and left ICA. IMPRESSION: Findings are consistent with 0-49% stenosis of the right  internal carotid and 0-49% stenosis of the left internal carotid. Vertebrals are patent with antegrade flow. ADDITIONAL COMMENTS    I have personally reviewed the data relevant to the interpretation of  this  study.     TECHNOLOGIST: YAMILA Jade  Signed: 2017 11:53:29 AM    PHYSICIAN: Cody Martinez. Ciatie Ty MD  Signed: 11/29/2017 11:52:57 AM

## 2017-11-28 NOTE — PROGRESS NOTES
Pharmacy Dosing Services: 11/27/17    Consult for Warfarin Dosing by Pharmacy by Dr. Bret Penn provided for this 73 y.o.  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)  Day of Therapy: Continued from home (warfarin 2.5 mg TuThSaSu and 1.25 mg MWF)  Dose to achieve an INR goal of 2.5- 3.5   Clarified INR goal with patient. He states that he has a [place] prosthetic valve in the aortic (not mitral) position. Typically, the [place] mechanical aortic valve carries an INR goal of 2-3. Patient states, however, that his ideal INR is 3.0 with the range being 2.5-3.5. He has been managing his own warfarin for years. Perhaps other risk factors are involved that affect the INR goal. If necessary, this can be further clarified with his cardiologist Dr. Reza Awad (as listed on his medication list from home). Order entered for Warfarin  1.25 mg to be given now (has not yet received his evening dose)     Significant drug interactions: none  LABS    PT/INR Lab Results   Component Value Date/Time    INR 3.6 11/27/2017 08:24 PM    INR (POC) 2.7 11/27/2017 08:25 PM      Platelets Lab Results   Component Value Date/Time    PLATELET 099 82/55/1637 08:24 PM      H/H     Lab Results   Component Value Date/Time    HGB 14.1 11/27/2017 08:24 PM        Pharmacy to follow daily and will provide subsequent Warfarin dosing based on clinical status. Thank you for the consult,  Sang BEAN [facility]

## 2017-11-28 NOTE — PROGRESS NOTES
[facility]: MED RECONCILIATION    Comments/Recommendations:   · Patient appears to be very familiar with his medications and reports compliance. He has NOT had his evening meds (warfarin and pravastatin) however. · Warfarin - Patient manages his own medications and adjusts dosages of warfarin as needed based on his INR which he gets about every Thursday. He reports that his INR goal is 2.5-3.5 ([place] mechanical valve) and this past Thursday his INR was ~4.4. He made some temporary minor adjustments but is now on his regular regimen reported below (2.5 mg TuThSaSu and 1.25 mg MWF). Patient's INR was therapeutic (2.7) on admission, therefore TPA is not recommended. · Patient sees Dr. Sarah Cerrato (cardiologist), Dr. Christina Treadwell ([facility] PCP), and Dr. Sp Hull ([place] PCP)    Information obtained from: patient, patient's wife, medication list provided for review. Significant PMH/Disease States: No past medical history on file. Chief Complaint for this Admission:   Chief Complaint   Patient presents with    Aphasia     Allergies: Review of patient's allergies indicates no known allergies. Prior to Admission Medications:     Medication Documentation Review Audit       Reviewed by Jac Quintana PHARMD (Pharmacist) on 11/27/17 at 2102         Medication Sig Documenting Provider Last Dose Status Taking?      acetaminophen (TYLENOL) 325 mg tablet Take 325 mg by mouth every six (6) hours as needed for Pain. Historical Provider 11/27/2017 am Active Yes    bisoprolol (ZEBETA) 5 mg tablet Take 5 mg by mouth daily. Historical Provider 11/27/2017 am Active Yes    cholecalciferol, vitamin D3, (VITAMIN D3) 2,000 unit tab Take 2,000 Units by mouth daily. Historical Provider 11/27/2017 am Active Yes    pravastatin (PRAVACHOL) 40 mg tablet Take 40 mg by mouth nightly. Historical Provider 11/26/2017 pm Active Yes    spironolactone (ALDACTONE) 25 mg tablet Take 25 mg by mouth daily.  Historical Provider 11/27/2017 am Active Yes    torsemide (DEMADEX) 10 mg tablet Take 10 mg by mouth daily. Historical Provider 11/27/2017 am Active Yes    valsartan (DIOVAN) 80 mg tablet Take 80 mg by mouth daily. Historical Provider 11/27/2017 am Active Yes    warfarin (COUMADIN) 2.5 mg tablet Take 2.5 mg by mouth every Tuesday, Thursday, Saturday & Sunday. 2.5 mg TuThSaSu and 1.25 mg MWF Historical Provider 11/26/2017 pm Active Yes    warfarin (COUMADIN) 2.5 mg tablet Take 1.25 mg by mouth every Monday, Wednesday, Friday. 2.5 mg TuThSaSu and 1.25 mg MWF Historical Provider 11/24/2017 pm Active Yes                  Thank you for the consult,  Chet Meyers

## 2017-11-28 NOTE — CONSULTS
[facility]   [facility], [address]   [address]       Name:  Shanice Porter   MR#:  [id number]   :  1945   Account #:  [de-identified]    Date of Consultation:  2017   Date of Adm:  2017       REASON FOR CONSULTATION: Seen by request of Hospitalist staff   on this patient for a suspected TIA. HISTORY OF PRESENT ILLNESS: The patient is a pleasant 72-year-  old  who was admitted yesterday, and in the afternoon was   complaining of an isolated sudden speech arrest, difficulty communicating, and it   was persistent and lasted for 2+ hours. Feels like he is pretty much   over it at this point, and according to house staff, that is their a   statement as well on admission. PAST MEDICAL HISTORY: Background history of arthritis, cancer. He   is on chronic anticoagulation due to mechanical aortic valve I think. I   think he may be in atrial fibrillation, history of heart failure, has a   pacemaker in place. SOCIAL HISTORY: Never smoker. Alcohol use none. FAMILY HISTORY: Positive for cancer. ALLERGIES: NO KNOWN ALLERGIES. MEDICATIONS PRIOR TO ADMISSION: Including those that are   referenced, mentions   1. Coumadin. 2. Diovan. 3. Laroy Peals. 4. Vitamin D3.   5. Aldactone. 6. Demadex. 7. Pravachol. 8. Takes Tylenol as needed. REVIEW OF SYSTEMS: Negative or per history of present illness,   feels like he is back to his baseline. LABORATORY DATA: Testing since here includes a normal CBC with   differential. His INR is 2.7. Chemistries show random glucose 102,   creatinine 1.4. DIAGNOSTIC DATA: His Code Neuro head CT showed no acute   abnormality. The patient's portable chest film showed no acute CP   pathology. EKG 12-lead is being read out as ventricular paced rhythm,   abnormal EKG. No previous EKGs available for reference. PHYSICAL EXAMINATION   GENERAL: Pleasant-appearing, elderly white male.  He is alert. He is   cooperative. Currently speech is fluent. He is articulate. Can repeat,   can follow 3-step commands. Good object recognition and naming   body parts, right and left orientation. Oriented to Tuesday, did not know   the date, knew the month, knew the year and knew it was [address]: Pulse is 79, blood pressure 115/66, respirations 19,   pulse oximetry 94% on room air. HEENT: Normocephalic, atraumatic without bruits. Ears, nose and   throat were clear. NECK: Reasonably supple, no lymphadenopathy, carotid upstroke,   nontender, no bruits. Range of motion complete. CHEST: Clear. No rales or wheezes. CARDIOVASCULAR: Currently regular rate and rhythm, without   gallops, murmurs or rubs. He does have a very distinct aortic valve   closure on the right 2nd intercostal space. ABDOMEN: Rounded, nontender, active bowel sounds. EXTREMITIES: Full range of motion without cyanosis, clubbing,   edema, rash or birthmarks. No involuntary movements seen. NEUROLOGIC: Cranial nerves II-XII with funduscopic normal under   nondilated conditions. Pupils equal and reactive to light. Afferent   pupillary defect negative. Lid fissures symmetric. External appearance   normal. Face sensibility and expression intact. Oral exam normal,   including normal motor mechanics of tongue and palate. Speech is   fluent and articulate. CEREBELLAR TESTING: Finger-nose-finger, toe-to-finger slow but on   target. Motor nonlocalizing. Sensibility intact to touch, temperature and   vibratory. Reflexes were approaching +2, except for trace to +1 ankle   jerks. The toes were downgoing, no clonus, no Lata. Gait, etc.,   deferred. IMPRESSION: Suspect transient ischemic attack, agree. As per   Hospitalist, the patient already anticoagulated. What I can see is   ordered from Hospitalist includes carotid Dopplers, echo and check of   lipids. Physical Therapy, Occupational Therapy and Speech to see. Neuro checks. Swallowing evaluation. His vascular issues that would potentially relate to   cancer, obviously includes his heart, including mechanical valve at   this time, and I am not sure what his underlying cancer status is at this   point (question andres). He has background hypertension. Further   suggestions once the testing follows. I would imagine his workup is   going to be rather fast.    Thanks for the chance to see this nice gentleman.         Ki Balderas MD      [facility]   D:  11/28/2017   04:49   T:  11/28/2017   10:36   Job #:  [id number]

## 2017-11-28 NOTE — ED PROVIDER NOTES
HPI Comments: 73 y.o. male with past medical history significant for aortic valve replacement and pacemaker who presents ambulatory from home accompanied by his wife and son-in-law with chief complaint of aphasia. Per wife, confusion, aphasia and nonsensical speech onset acutely at 1. Pt reports difficulty getting his words out accompanied by decreased spatial awareness. Pt is anti-coagulated on Coumadin. Pt denies previous history of the present symptoms, stroke, and DM. Pt states symptoms were accompanied by \"quite a bit\" of SOB, which is \"relatively new\". Per wife, pt was unable to fasten his seatbelt without assistance prior to arrival. Pt specifically denies numbness, tingling, visual changes, tinnitus and chest pain. There are no other acute medical concerns at this time. Social hx: denies tobacco use; denies EtOH use;   PCP: No primary care provider on file. Note written by Aliza Pickens, as dictated by Tyrel Farley MD 8:25 PM        The history is provided by the patient and the spouse. No  was used. No past medical history on file. No past surgical history on file. No family history on file. Social History     Social History    Marital status: N/A     Spouse name: N/A    Number of children: N/A    Years of education: N/A     Occupational History    Not on file. Social History Main Topics    Smoking status: Not on file    Smokeless tobacco: Not on file    Alcohol use Not on file    Drug use: Not on file    Sexual activity: Not on file     Other Topics Concern    Not on file     Social History Narrative         ALLERGIES: Review of patient's allergies indicates no known allergies. Review of Systems   Constitutional: Negative for activity change, chills and fever. HENT: Negative for nosebleeds, sore throat, tinnitus, trouble swallowing and voice change. Eyes: Negative for visual disturbance.    Respiratory: Positive for shortness of breath. Cardiovascular: Negative for chest pain and palpitations. Gastrointestinal: Negative for abdominal pain, constipation, diarrhea and nausea. Genitourinary: Negative for difficulty urinating, dysuria, hematuria and urgency. Musculoskeletal: Negative for back pain, neck pain and neck stiffness. Skin: Negative for color change. Allergic/Immunologic: Negative for immunocompromised state. Neurological: Positive for speech difficulty. Negative for dizziness, seizures, syncope, weakness, light-headedness, numbness and headaches. Psychiatric/Behavioral: Positive for confusion. Negative for behavioral problems, hallucinations, self-injury and suicidal ideas. Vitals:    11/27/17 2013   BP: 133/75   Pulse: 82   Resp: 20   SpO2: 96%   Weight: 88.9 kg (196 lb)   Height: 6' 1\" (1.854 m)            Physical Exam   Constitutional: He is oriented to person, place, and time. He appears well-developed and well-nourished. No distress. HENT:   Head: Normocephalic and atraumatic. Eyes: Pupils are equal, round, and reactive to light. Neck: Normal range of motion. Neck supple. Cardiovascular: Normal rate and regular rhythm. Exam reveals no gallop and no friction rub. Murmur (mechanical ejection) heard. Pulmonary/Chest: Effort normal and breath sounds normal. No respiratory distress. He has no decreased breath sounds. He has no wheezes. He has no rhonchi. He has no rales. Well-healed mid-line sternotomy scar. Left chest with healing pocket pacemaker incision    Abdominal: Soft. Bowel sounds are normal. He exhibits no distension. There is no tenderness. There is no rebound and no guarding. Musculoskeletal: Normal range of motion. Neurological: He is alert and oriented to person, place, and time. Past pointing with finger-nose-finger. Otherwise, non-focal.   Skin: Skin is warm. No rash noted. He is not diaphoretic. Psychiatric: He has a normal mood and affect.  His behavior is normal. Judgment and thought content normal.   Nursing note and vitals reviewed. Note written by Aliza Rain, as dictated by Lia Martinez MD 8:25 PM     MDM  Number of Diagnoses or Management Options  Transient cerebral ischemia, unspecified type:   Diagnosis management comments: Total critical care time spent exclusive of procedures:  35min      ED Course   This is a 82-year-old male with past medical history, review of systems, physical exam as above, presenting with acute onset of aphasia, manifesting as difficulty with word finding, as well speech difficulties. Family states his symptoms onset approximately 45 minutes prior to arrival, upon arrival patient is awake, alert, with no visible deficits with the exception of pass pointing, speech is clear nontympanitic, however patient states he still having difficulty finding the right words. Plan to move forward with emergent stroke workup, including head CT, coags, CMP, CBC, chest x-ray, neurology consultation. Will make a disposition based on the patient's diagnostics and response to therapy. Procedures    CONSULT NOTE:  8:25 PM Lia Martinez MD spoke with Dr. Dutch De La Garza, Consult for Tele-Neurology. Discussed available diagnostic tests and clinical findings. He is in agreement with care plans as outlined. Dr. Raeann Soliz will evaluate the patient in the ED via tele-neurology monitor. 8:30 PM  POC INR 2.7    ED EKG interpretation:  Rhythm: v-paced. Rate (approx.): 75. Note written by Aliza Rain, as dictated by Lia Martinez MD 8:30 PM    CONSULT NOTE:  8:46 PM Lia Martinez MD spoke with Dr. Dutch De La Garza, Consult for Tele-Neurology. Discussed available diagnostic tests and clinical findings. He is in agreement with care plans as outlined. Dr. Raeann Soliz recommends admission for further evaluation and treatment. Pt is not a TPA candidate.  He recommends against CTA, but recommends admission for further evaluation and treatment. 9:44 PM  Discussed available lab and imaging results with patient and wife. Both verbalize understanding and agree with care plan. Will admit patient to hospitalist service for further evaluation and treatment. CONSULT NOTE:  9:44 PM Hilda Manning MD spoke with Dr. Allan Vera, Consult for Hospitalist.  Discussed available diagnostic tests and clinical findings. He is in agreement with care plans as outlined. Dr. Allan Vera will evaluate and admit the patient. Recent Results (from the past 24 hour(s))   GLUCOSE, POC    Collection Time: 11/27/17  8:08 PM   Result Value Ref Range    Glucose (POC) 94 65 - 100 mg/dL    Performed by [address], POC    Collection Time: 11/27/17  8:23 PM   Result Value Ref Range    Glucose (POC) 98 65 - 100 mg/dL    Performed by Kelly Ramsay    CBC WITH AUTOMATED DIFF    Collection Time: 11/27/17  8:24 PM   Result Value Ref Range    WBC 6.0 4.1 - 11.1 K/uL    RBC 4.72 4.10 - 5.70 M/uL    HGB 14.1 12.1 - 17.0 g/dL    HCT 41.8 36.6 - 50.3 %    MCV 88.6 80.0 - 99.0 FL    MCH 29.9 26.0 - 34.0 PG    MCHC 33.7 30.0 - 36.5 g/dL    RDW 19.1 (H) 11.5 - 14.5 %    PLATELET 973 763 - 163 K/uL    NEUTROPHILS 50 32 - 75 %    LYMPHOCYTES 42 12 - 49 %    MONOCYTES 8 5 - 13 %    EOSINOPHILS 0 0 - 7 %    BASOPHILS 0 0 - 1 %    ABS. NEUTROPHILS 3.0 1.8 - 8.0 K/UL    ABS. LYMPHOCYTES 2.5 0.8 - 3.5 K/UL    ABS. MONOCYTES 0.5 0.0 - 1.0 K/UL    ABS. EOSINOPHILS 0.0 0.0 - 0.4 K/UL    ABS.  BASOPHILS 0.0 0.0 - 0.1 K/UL    DF MANUAL      RBC COMMENTS ANISOCYTOSIS  1+        RBC COMMENTS OVALOCYTES  PRESENT        RBC COMMENTS TEARDROP CELLS  PRESENT        WBC COMMENTS REACTIVE LYMPHS     METABOLIC PANEL, COMPREHENSIVE    Collection Time: 11/27/17  8:24 PM   Result Value Ref Range    Sodium 142 136 - 145 mmol/L    Potassium 4.7 3.5 - 5.1 mmol/L    Chloride 105 97 - 108 mmol/L    CO2 29 21 - 32 mmol/L    Anion gap 8 5 - 15 mmol/L    Glucose 102 (H) 65 - 100 mg/dL    BUN 20 6 - 20 MG/DL    Creatinine 1.40 (H) 0.70 - 1.30 MG/DL    BUN/Creatinine ratio 14 12 - 20      GFR est AA >60 >60 ml/min/1.73m2    GFR est non-AA 50 (L) >60 ml/min/1.73m2    Calcium 9.7 8.5 - 10.1 MG/DL    Bilirubin, total 0.8 0.2 - 1.0 MG/DL    ALT (SGPT) 25 12 - 78 U/L    AST (SGOT) 20 15 - 37 U/L    Alk. phosphatase 99 45 - 117 U/L    Protein, total 7.5 6.4 - 8.2 g/dL    Albumin 3.9 3.5 - 5.0 g/dL    Globulin 3.6 2.0 - 4.0 g/dL    A-G Ratio 1.1 1.1 - 2.2     TROPONIN I    Collection Time: 11/27/17  8:24 PM   Result Value Ref Range    Troponin-I, Qt. <0.04 <0.05 ng/mL   PROTHROMBIN TIME + INR    Collection Time: 11/27/17  8:24 PM   Result Value Ref Range    INR 3.6 (H) 0.9 - 1.1      Prothrombin time 37.8 (H) 9.0 - 11.1 sec   PTT    Collection Time: 11/27/17  8:24 PM   Result Value Ref Range    aPTT 51.1 (H) 22.1 - 32.5 sec    aPTT, therapeutic range     58.0 - 77.0 SECS   POC INR    Collection Time: 11/27/17  8:25 PM   Result Value Ref Range    INR (POC) 2.7 (H) <1.2     EKG, 12 LEAD, INITIAL    Collection Time: 11/27/17  8:29 PM   Result Value Ref Range    Ventricular Rate 75 BPM    Atrial Rate 75 BPM    QRS Duration 192 ms    Q-T Interval 464 ms    QTC Calculation (Bezet) 518 ms    Calculated R Axis 151 degrees    Calculated T Axis -37 degrees    Diagnosis       Ventricular-paced rhythm  Abnormal ECG  No previous ECGs available         Xr Chest Port    Result Date: 11/27/2017  EXAM:  XR CHEST PORT INDICATION:  stroke COMPARISON:  None. FINDINGS: A portable AP radiograph of the chest was obtained at 2021 hours. Pacer leads appear intact. .  There is slight elevation of the right hemidiaphragm. Lungs are clear of an acute process. .  There is mild cardiomegaly. Patient status post median sternotomy. IMPRESSION: No acute abnormality identified. Ct Code Neuro Head Wo Contrast    Result Date: 11/27/2017  EXAM:  CT CODE NEURO HEAD WO CONTRAST INDICATION:   speech/stroke symptoms COMPARISON: None. CONTRAST:  None. TECHNIQUE: Unenhanced CT of the head was performed using 5 mm images. Brain and bone windows were generated. CT dose reduction was achieved through use of a standardized protocol tailored for this examination and automatic exposure control for dose modulation. FINDINGS: There is slight prominence of ventricles and sulci. There is mild prominence of the cerebellar folia. No hemorrhage mass or acute infarction is identified. Bony structures appear intact. There is mucosal thickening left maxillary sinus. Vascular calcifications noted. IMPRESSION: No acute abnormality identified.

## 2017-11-28 NOTE — PROGRESS NOTES
[facility]  [facility] [address] [facility] [place]  [phone]     Inpatient Neurology Progress  Shirley Rossi, [facility]  Name: Stacia Wills     : 1945   MRN: [id number]   Date: 2017   ______________________________________________________________________  * I have reviewed flowsheet data, labs and previous day's notes.     Subjective:   CC:  I feel better   ·  wife states he seems a lot better today but he doesn't seem 100% at baseline  ·  denies new symptoms or repeated symptoms from last night  ·  BP stable today  Objective:     Vital Signs:    Visit Vitals    /63    Pulse (!) 107    Temp 97.8 °F (36.6 °C)    Resp 21    Ht 6' 1\" (1.854 m)    Wt 88.9 kg (196 lb)    SpO2 96%    BMI 25.86 kg/m2       O2 Device: Room air       Temp (24hrs), Av.8 °F (36.6 °C), Min:97.8 °F (36.6 °C), Max:97.8 °F (36.6 °C)       Intake/Output:   No intake or output data in the 24 hours ending 17 0851    Medications:  Current Facility-Administered Medications   Medication Dose Route Frequency    bisoprolol (ZEBETA) tablet 5 mg  5 mg Oral DAILY    pravastatin (PRAVACHOL) tablet 40 mg  40 mg Oral QHS    spironolactone (ALDACTONE) tablet 25 mg  25 mg Oral DAILY    torsemide (DEMADEX) tablet 10 mg  10 mg Oral DAILY    valsartan (DIOVAN) tablet 80 mg  80 mg Oral DAILY    labetalol (NORMODYNE;TRANDATE) injection 10 mg  10 mg IntraVENous Q4H PRN    0.9% sodium chloride infusion  75 mL/hr IntraVENous CONTINUOUS    sodium chloride (NS) flush 5-10 mL  5-10 mL IntraVENous Q8H    sodium chloride (NS) flush 5-10 mL  5-10 mL IntraVENous PRN    acetaminophen (TYLENOL) tablet 650 mg  650 mg Oral Q4H PRN    oxyCODONE-acetaminophen (PERCOCET) 5-325 mg per tablet 1 Tab  1 Tab Oral Q4H PRN    HYDROmorphone (PF) (DILAUDID) injection 0.5 mg  0.5 mg IntraVENous Q4H PRN    prochlorperazine (COMPAZINE) injection 10 mg  10 mg IntraVENous Q6H PRN    zolpidem (AMBIEN) tablet 5 mg  5 mg Oral QHS PRN  Warfarin - Pharmacy to dose   Other Rx Dosing/Monitoring     Current Outpatient Prescriptions   Medication Sig    valsartan (DIOVAN) 80 mg tablet Take 80 mg by mouth daily.  bisoprolol (ZEBETA) 5 mg tablet Take 5 mg by mouth daily.  cholecalciferol, vitamin D3, (VITAMIN D3) 2,000 unit tab Take 2,000 Units by mouth daily.  spironolactone (ALDACTONE) 25 mg tablet Take 25 mg by mouth daily.  torsemide (DEMADEX) 10 mg tablet Take 10 mg by mouth daily.  pravastatin (PRAVACHOL) 40 mg tablet Take 40 mg by mouth nightly.  warfarin (COUMADIN) 2.5 mg tablet Take 2.5 mg by mouth every Tuesday, Thursday, Saturday & Sunday. 2.5 mg TuThSaSu and 1.25 mg MWF    warfarin (COUMADIN) 2.5 mg tablet Take 1.25 mg by mouth every Monday, Wednesday, Friday. 2.5 mg TuThSaSu and 1.25 mg MWF    acetaminophen (TYLENOL) 325 mg tablet Take 325 mg by mouth every six (6) hours as needed for Pain. Physical Exam:   General: Well developed older appearing than age WM in NAD  Lungs:    Clear to auscultation bilaterally       Cardiac: Regular rate and rhythm   Neck: Supple, no bruits  Extrem: no LE edema          Skin:  Intact, dry    Neurological Exam: alert, awake, oriented x 4.   Slight confusion or difficulty following directions     · Eyes: tracking objects and making eye contact, R HENRY--chronic  · Speech: clear, mild expressive aphasia with word finding and slower processing  · Strength: equal 5/5,   · Coordination: FNF intact on L but R mild dysmetria, HTS intact bilaterally  · reflexes throughout 2+/4 and toes downgoing    ____________________________________________________________________    Labs:   Recent Labs      11/28/17   0504   WBC  6.2   HGB  12.8   HCT  39.9   PLT  182     Recent Labs      11/28/17 0504  11/27/17 2025 11/27/17 2024   NA  139   --   142   K  4.5   --   4.7   CL  105   --   105   CO2  25   --   29   GLU  104*   --   102*   BUN  20   --   20   CREA  1.11   --   1.40*   CA  9.3   -- 9.7   MG  2.0   --    --    PHOS  3.7   --    --    ALB   --    --   3.9   TBILI   --    --   0.8   SGOT   --    --   20   ALT   --    --   25   INR  3.1*  2.7*  3.6*     No results for input(s): PH, PCO2, PO2, HCO3, FIO2 in the last 72 hours. Imaging:  CT Results (recent):    Results from Hospital Encounter encounter on 11/27/17   CT CODE NEURO HEAD WO CONTRAST   Narrative EXAM:  CT CODE NEURO HEAD WO CONTRAST    INDICATION:   speech/stroke symptoms    COMPARISON: None. CONTRAST:  None. TECHNIQUE: Unenhanced CT of the head was performed using 5 mm images. Brain and  bone windows were generated. CT dose reduction was achieved through use of a  standardized protocol tailored for this examination and automatic exposure  control for dose modulation. FINDINGS:  There is slight prominence of ventricles and sulci. There is mild prominence of  the cerebellar folia. No hemorrhage mass or acute infarction is identified. Bony  structures appear intact. There is mucosal thickening left maxillary sinus. Vascular calcifications noted. Impression IMPRESSION: No acute abnormality identified. MRI Results (recent):  No results found for this or any previous visit.  ____________________________________________________________________    Review of Systems: Denies:  SOB, angina, NV, fever, weakness, paresthesias    Impression:   Summary:  WM who presented to ED after an acute transient event of expressive aphasia lasting 2 hours around 730pm.  Pt had minor confusion and slight SOB per ED note. 1.  TIA   2. Expressive aphasia-- resolved  3. HTN  4. HLD    Plan:     Note mild confusion and slight difficulty following directions still. Would not pursue CT head today and have ordered OP CT head within 3 days on Thurs/Friday to re-eval for CVA or TIA dx and pt to follow-up OP neuro for appt. · Medications: continue Warfarin and Pravachol 40mg  · Labs/testing: LDL 48 at goal <70.   Awaiting MRI/MRA, therapy evals, TTE and carotids. · Lengthy discussion with pt and wife re:  BEFAST, secondary prevention with personal risk factors noted in assessment and continue Pravachol/Warfarin  · Scheduled OP neuro appt in clinic post TIA    May be discharged once:    Therapy has seen patient, TTE and Carotid doppler's have been done    · Continue great care by collaborating care team and nursing staff. · Testing discussed with patient and family -- any questions answered. On DVT Prophylaxis yes no   Continue warfarin while inpatient x      Care Plan discussed with:  Patient x   Family: wife x   RN:    Care Manager    Consultant/Specialist:       My collaborating care team physician may have further recommendations.   Patient will be discussed with Dr. Charlotte Caballero  ______________________________________________________________  Hospital Problems  Date Reviewed: 11/28/2017          Codes Class Noted POA    * (Principal)TIA (transient ischemic attack) ICD-10-CM: G45.9  ICD-9-CM: 435.9  11/27/2017 Yes        CHF (congestive heart failure) (HCC) (Chronic) ICD-10-CM: I50.9  ICD-9-CM: 428.0  11/27/2017 Yes        HTN (hypertension) (Chronic) ICD-10-CM: I10  ICD-9-CM: 401.9  11/27/2017 Yes        Elevated serum creatinine ICD-10-CM: R79.89  ICD-9-CM: 790.99  11/27/2017 Yes        Chronic anticoagulation (Chronic) ICD-10-CM: Z79.01  ICD-9-CM: V58.61  11/27/2017 Yes               ================================================  ---YEVGENIY Antonio  ______________________________________________________________    ADDENDUM--> Collaborating Care Team Physician:

## 2017-11-28 NOTE — PROGRESS NOTES
[facility] Dosing Services: 11/28/17    Consult for Warfarin Dosing by Pharmacy by Dr. Lurdes Quan provided for this 73 y.o.  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)  Day of Therapy: Continued from home (warfarin 2.5 mg TuThSaSu and 1.25 mg MWF)  Dose to achieve an INR goal of 2.5- 3.5    Order entered for  Warfarin  2.5 (mg) ordered to be given today at 18:00. Significant drug interactions:   Previous dose given 1.25 mg   PT/INR Lab Results   Component Value Date/Time    INR 3.1 11/28/2017 05:04 AM      Platelets Lab Results   Component Value Date/Time    PLATELET 662 06/05/6944 05:04 AM      H/H Lab Results   Component Value Date/Time    HGB 12.8 11/28/2017 05:04 AM        Pharmacy to follow daily and will provide subsequent Warfarin dosing based on clinical status.   Lakshmi Woods, [address] [place] [address])  Contact information [phone]

## 2017-11-28 NOTE — PROGRESS NOTES
Problem: Mobility Impaired (Adult and Pediatric)  Goal: *Acute Goals and Plan of Care (Insert Text)  Physical Therapy Goals  Initiated 11/28/2017  1. Patient will move from supine to sit and sit to supine  in bed with modified independence within 7 day(s). 2.  Patient will transfer from bed to chair and chair to bed with modified independence using the least restrictive device within 7 day(s). 3.  Patient will perform sit to stand with modified independence within 7 day(s). 4.  Patient will ambulate with modified independence for 150 feet with the least restrictive device within 7 day(s). 5.  Patient will ascend/descend 4 stairs with B handrail(s) with minimal assistance/contact guard assist within 7 day(s). physical Therapy EVALUATION  Patient: Leila Cazares (87 y.o. male)  Date: 11/28/2017  Primary Diagnosis: TIA (transient ischemic attack)        Precautions: falls       ASSESSMENT :  Based on the objective data described below, the patient presents with decreased B LE ROM, decreased activity tolerance, impaired dynamic standing balance s/p admission on 11/27/17 for aphasia lasting 2+ hours. PMH includes CHF, HTN, pacemaker placement. PTA, patient reports living in two story home with wife and bedroom/bathroom available on first floor, 4 CHLOE. Patient reports completing ADLs and household/community ambulation independently prior to admission but SPC available at home. Patient received supine in bed with wife present at bedside, agreeable to PT. Patient reports symptoms at time of admission have now resolved, although patient still presenting with minor difficulty attending to questions asked of him during history taking and with following commands during ROM assessment of L LE. Patient completed bed mobility with supervision, sit-stand transfers with CGA. PT then initiated Mojica balance assessment, see below for scoring.  Patient demonstrated difficulty with turns to L, unable to follow verbal cuing to maintain turning. Upon completion of about half of Mojica assessment, patient then reported SOB and mild nausea and was returned to sitting EOB. Patient's vitals then obtained. Patient became orthostatic with upright activity, see below for vitals. Patient required return to supine for BP to stabilize to patient's baseline. Patient's HR throughout session also fluctuating, dropping to ~30bpm and increasing to 120bpm. Patient and his wife reporting patient had recent pacemaker replacement approximately 2 weeks ago. RN notified. PT educated patient regarding BE FAST stroke symptoms, verbalized understanding. Patient able to participate in PT but unable to tolerate upright activity safely due to orthostatic BP and concurrent symptoms and fluctuating HR. PT will continue to follow patient throughout his plan of care, additional follow up PT services TBD with completion of Mojica and additional upright activity. Patient will benefit from skilled intervention to address the above impairments.   Patients rehabilitation potential is considered to be Good  Factors which may influence rehabilitation potential include:   []         None noted  []         Mental ability/status  [x]         Medical condition  []         Home/family situation and support systems  []         Safety awareness  []         Pain tolerance/management  []         Other:      PLAN :  Recommendations and Planned Interventions:  [x]           Bed Mobility Training             []    Neuromuscular Re-Education  [x]           Transfer Training                   []    Orthotic/Prosthetic Training  [x]           Gait Training                         []    Modalities  [x]           Therapeutic Exercises           []    Edema Management/Control  [x]           Therapeutic Activities            [x]    Patient and Family Training/Education  []           Other (comment):    Frequency/Duration: Patient will be followed by physical therapy  5 times a week to address goals.  Discharge Recommendations: To Be Determined  Further Equipment Recommendations for Discharge: TBD     SUBJECTIVE:   Patient stated I feel a little light headed.     OBJECTIVE DATA SUMMARY:   HISTORY:    Past Medical History:   Diagnosis Date    Arthritis     [facility] [address] [facility]     Chronic anticoagulation 11/27/2017    Heart failure ([facility] [address] [facility]     Pacemaker 11/28/2017    -- unsure if MRI compatible     Past Surgical History:   Procedure Laterality Date    CARDIAC SURG PROCEDURE UNLIST      HX PACEMAKER       Prior Level of Function/Home Situation: See above  Personal factors and/or comorbidities impacting plan of care:     Home Situation  Home Environment: Private residence  # Steps to Enter: 4  Rails to Enter: Yes  Hand Rails : Bilateral  One/Two Story Residence: Two story, live on 1st floor  Living Alone: No  Support Systems: Spouse/Significant Other/Partner  Patient Expects to be Discharged to[de-identified] Private residence  Current DME Used/Available at Home: aiden Peña    EXAMINATION/PRESENTATION/DECISION MAKING:   Vitals:    11/28/17 1223 11/28/17 1224 11/28/17 1226 11/28/17 1227   BP: 100/56 98/71 125/73 125/73   BP 1 Location: Left arm Left arm  Left arm   BP Patient Position: During activity Post activity; Sitting  At rest;Supine   Pulse: (!) 48 69 (!) 103 (!) 112   Resp:   23    Temp:       SpO2: 94% 96% 98%    Weight:       Height:           Critical Behavior:  Neurologic State: Alert, Appropriate for age  Orientation Level: Appropriate for age, Oriented X4  Cognition: Appropriate decision making, Appropriate for age attention/concentration, Appropriate safety awareness, Follows commands     Hearing:   Auditory  Auditory Impairment: None  Skin:    Edema:   Range Of Motion:  AROM: Generally decreased, functional           PROM: Generally decreased, functional           Strength:    Strength: Generally decreased, functional                    Tone & Sensation:   Tone: Normal Coordination:     Vision:      Functional Mobility:  Bed Mobility:     Supine to Sit: Supervision  Sit to Supine: Supervision     Transfers:  Sit to Stand: Contact guard assistance  Stand to Sit: Contact guard assistance                       Balance:   Sitting: Intact  Standing: Impaired  Standing - Static: Good  Standing - Dynamic : Fair  Ambulation/Gait Training:                                                         Stairs: Therapeutic Exercises:       Functional Measure:  Mojica Balance Test:    Sitting to Standing: 3  Standing Unsupported: 3  Sitting with Back Unsupported: 4  Standing to Sitting: 3  Transfers: 3  Standing Unsupported with Eyes Closed: 3  Standing Unsupported with Feet Together: 2  Reach Forward with Outstretched Arm: 0   Object: 0  Turn to Look Over Shoulders: 4  Turn 360 Degrees: 1  Alternate Foot on Step/Stool: 0  Standing Unsupported One Foot in Front: 0  Stand on One Le  Total: 26         56=Maximum possible score;   0-20=High fall risk  21-40=Moderate fall risk   41-56=Low fall risk     Mojica Balance Test and G-code impairment scale:  Percentage of Impairment CH    0%   CI    1-19% CJ    20-39% CK    40-59% CL    60-79% CM    80-99% CN     100%   Mojica   Score 0-56 56 45-55 34-44 23-33 12-22 1-11 0         G codes: In compliance with CMSs Claims Based Outcome Reporting, the following G-code set was chosen for this patient based on their primary functional limitation being treated: The outcome measure chosen to determine the severity of the functional limitation was the [place] with a score of 26/56 which was correlated with the impairment scale.     ? Mobility - Walking and Moving Around:     - CURRENT STATUS: CK - 40%-59% impaired, limited or restricted    - GOAL STATUS: CJ - 20%-39% impaired, limited or restricted    - D/C STATUS:  ---------------To be determined---------------      Physical Therapy Evaluation Charge Determination   History Examination Presentation Decision-Making   MEDIUM  Complexity : 1-2 comorbidities / personal factors will impact the outcome/ POC  MEDIUM Complexity : 3 Standardized tests and measures addressing body structure, function, activity limitation and / or participation in recreation  MEDIUM Complexity : Evolving with changing characteristics  Other outcome measures Mojica  MEDIUM      Based on the above components, the patient evaluation is determined to be of the following complexity level: MEDIUM    Pain:  Pain Scale 1: Numeric (0 - 10)  Pain Intensity 1: 0              Activity Tolerance:   Poor - patient became orthostatic with Kailey Spotted, see above for vitals  Please refer to the flowsheet for vital signs taken during this treatment. After treatment:   []         Patient left in no apparent distress sitting up in chair  [x]         Patient left in no apparent distress in bed  [x]         Call bell left within reach  [x]         Nursing notified  [x]         Caregiver present  [x]         Bed alarm activated    COMMUNICATION/EDUCATION:   The patients plan of care was discussed with: Occupational Therapist and Registered Nurse. [x]         Fall prevention education was provided and the patient/caregiver indicated understanding. [x]         Patient/family have participated as able in goal setting and plan of care. [x]         Patient/family agree to work toward stated goals and plan of care. []         Patient understands intent and goals of therapy, but is neutral about his/her participation. []         Patient is unable to participate in goal setting and plan of care. Thank you for this referral.  Lani Knight   Time Calculation: 31 mins      Regarding student involvement in patient care:  A student participated in this treatment session. Per CMS Medicare statements and APTA guidelines I certify that the following was true:  1. I was present and directly observed the entire session.   2. I made all skilled judgments and clinical decisions regarding care. 3. I am the practitioner responsible for assessment, treatment, and documentation.

## 2017-11-28 NOTE — PROGRESS NOTES
[address]  [address] [facility], [address] [place] [address]  [phone]      Medical Progress Note      NAME: Nannette Betancourt   :  1945  MRM:  [id number]    Date/Time: 2017  2:02 PM       Assessment and Plan:   1. TIA (transient ischemic attack) (2017). carotid dopplers: unremarkable. Check echocardiogram; can't have MRI due to pacer/valve. Continue PT/OT. Neurology evaluation appreciated. 2.  CHF (congestive heart failure) ([facility] [address] (2017). Unknown EF. Check echo. 3.  HTN (hypertension) (2017). On valsartan                       4.  Elevated serum creatinine (2017). Resolved with IVF. 5.  Chronic afib/ AV repair. Chronic anticoagulation (2017) s/p PPM. Noted in ER tachy donal syndrome. Recent PPM placed in [address] [place] [address]. Consult cardiology. Pacemaker may need to be interogated. Subjective:     Chief Complaint:  Follow up of pt who was admitted with TIA. Noted tachy donal syndrome. ROS:  (bold if positive, if negative)      Tolerating PT  Tolerating Diet        Objective:     Last 24hrs VS reviewed since prior progress note.  Most recent are:    Visit Vitals    /73 (BP 1 Location: Left arm, BP Patient Position: At rest;Supine)    Pulse (!) 112    Temp 97.8 °F (36.6 °C)    Resp 23    Ht 6' 1\" (1.854 m)    Wt 88.9 kg (196 lb)    SpO2 98%    BMI 25.86 kg/m2     SpO2 Readings from Last 6 Encounters:   17 98%        No intake or output data in the 24 hours ending 17 1402     Physical Exam:    Gen:  Well-developed, well-nourished, in no acute distress  HEENT:  Pink conjunctivae, PERRL, hearing intact to voice, moist mucous membranes  Neck:  Supple, without masses, thyroid non-tender  Resp:  No accessory muscle use, clear breath sounds without wheezes rales or rhonchi  Card:  No murmurs, normal S1, S2 without thrills, bruits or peripheral edema  Abd:  Soft, non-tender, non-distended, normoactive bowel sounds are present, no palpable organomegaly and no detectable hernias  Lymph:  No cervical or inguinal adenopathy  Musc:  No cyanosis or clubbing  Skin:  No rashes or ulcers, skin turgor is good  Neuro:  Cranial nerves are grossly intact, no focal motor weakness, follows commands appropriately  Psych:  Good insight, oriented to person, place and time, alert  __________________________________________________________________  Medications Reviewed: (see below)  Medications:     Current Facility-Administered Medications   Medication Dose Route Frequency    bisoprolol (ZEBETA) tablet 5 mg  5 mg Oral DAILY    pravastatin (PRAVACHOL) tablet 40 mg  40 mg Oral QHS    spironolactone (ALDACTONE) tablet 25 mg  25 mg Oral DAILY    torsemide (DEMADEX) tablet 10 mg  10 mg Oral DAILY    valsartan (DIOVAN) tablet 80 mg  80 mg Oral DAILY    warfarin (COUMADIN) tablet 2.5 mg  2.5 mg Oral ONCE    labetalol (NORMODYNE;TRANDATE) injection 10 mg  10 mg IntraVENous Q4H PRN    0.9% sodium chloride infusion  75 mL/hr IntraVENous CONTINUOUS    sodium chloride (NS) flush 5-10 mL  5-10 mL IntraVENous Q8H    sodium chloride (NS) flush 5-10 mL  5-10 mL IntraVENous PRN    acetaminophen (TYLENOL) tablet 650 mg  650 mg Oral Q4H PRN    oxyCODONE-acetaminophen (PERCOCET) 5-325 mg per tablet 1 Tab  1 Tab Oral Q4H PRN    HYDROmorphone (PF) (DILAUDID) injection 0.5 mg  0.5 mg IntraVENous Q4H PRN    prochlorperazine (COMPAZINE) injection 10 mg  10 mg IntraVENous Q6H PRN    zolpidem (AMBIEN) tablet 5 mg  5 mg Oral QHS PRN    Warfarin - Pharmacy to dose   Other Rx Dosing/Monitoring     Current Outpatient Prescriptions   Medication Sig    valsartan (DIOVAN) 80 mg tablet Take 80 mg by mouth daily.  bisoprolol (ZEBETA) 5 mg tablet Take 5 mg by mouth daily.     cholecalciferol, vitamin D3, (VITAMIN D3) 2,000 unit tab Take 2,000 Units by mouth daily.  spironolactone (ALDACTONE) 25 mg tablet Take 25 mg by mouth daily.  torsemide (DEMADEX) 10 mg tablet Take 10 mg by mouth daily.  pravastatin (PRAVACHOL) 40 mg tablet Take 40 mg by mouth nightly.  warfarin (COUMADIN) 2.5 mg tablet Take 2.5 mg by mouth every Tuesday, Thursday, Saturday & Sunday. 2.5 mg TuThSaSu and 1.25 mg MWF    warfarin (COUMADIN) 2.5 mg tablet Take 1.25 mg by mouth every Monday, Wednesday, Friday. 2.5 mg TuThSaSu and 1.25 mg MWF    acetaminophen (TYLENOL) 325 mg tablet Take 325 mg by mouth every six (6) hours as needed for Pain. Lab Data Reviewed: (see below)  Lab Review:     Recent Labs      11/28/17 0504 11/27/17 2024   WBC  6.2  6.0   HGB  12.8  14.1   HCT  39.9  41.8   PLT  182  198     Recent Labs      11/28/17 0504 11/27/17 2025 11/27/17 2024   NA  139   --   142   K  4.5   --   4.7   CL  105   --   105   CO2  25   --   29   GLU  104*   --   102*   BUN  20   --   20   CREA  1.11   --   1.40*   CA  9.3   --   9.7   MG  2.0   --    --    PHOS  3.7   --    --    ALB   --    --   3.9   TBILI   --    --   0.8   SGOT   --    --   20   ALT   --    --   25   INR  3.1*  2.7*  3.6*     Lab Results   Component Value Date/Time    Glucose (POC) 98 11/27/2017 08:23 PM    Glucose (POC) 94 11/27/2017 08:08 PM     No results for input(s): PH, PCO2, PO2, HCO3, FIO2 in the last 72 hours. Recent Labs      11/28/17 0504 11/27/17 2025 11/27/17 2024   INR  3.1*  2.7*  3.6*     All Micro Results     None          I have reviewed notes of prior 24hr. Other pertinent lab:       Total time spent with patient: [address] discussed with: Patient, Family, Nursing Staff and >50% of time spent in counseling and coordination of care    Discussed:  Care Plan    Prophylaxis:  Coumadin    Disposition:  Home w/Family           ___________________________________________________    Attending Physician: Gopi Minor MD

## 2017-11-28 NOTE — CONSULTS
Surjit Knox MD Physician Signed Neurology Consults Date of Service: 17 1036         []Hide copied text  []Viktorver for attribution information  [address]   [address] [facility], [address] [place] [address] Ave   [facility]  Name:  Musa Sanchez   MR#:  [id number]   :  1945   Account #:  [de-identified]    Date of Consultation:  2017   Date of Adm:  2017         REASON FOR CONSULTATION: Seen by request of Hospitalist staff   on this patient for a suspected TIA.          ____________________________________________________________________    *   Please refer to the consult performed, at above date and time.       YEVGENIY John  ~ Neurology NP ~

## 2017-11-28 NOTE — ED NOTES
Neurology NP at bedside evaluating and talking with pt. Pt resting comfortably at this time. All prior symptoms of weakness and aphagia have resolved at this time.

## 2017-11-29 PROBLEM — I95.1 ORTHOSTATIC HYPOTENSION: Status: ACTIVE | Noted: 2017-11-29

## 2017-11-29 LAB
BNP SERPL-MCNC: 1815 PG/ML (ref 0–125)
INR PPP: 3 (ref 0.9–1.1)
PROTHROMBIN TIME: 31.1 SEC (ref 9–11.1)

## 2017-11-29 PROCEDURE — 97112 NEUROMUSCULAR REEDUCATION: CPT

## 2017-11-29 PROCEDURE — 97535 SELF CARE MNGMENT TRAINING: CPT

## 2017-11-29 PROCEDURE — 97116 GAIT TRAINING THERAPY: CPT

## 2017-11-29 PROCEDURE — G8987 SELF CARE CURRENT STATUS: HCPCS

## 2017-11-29 PROCEDURE — 97530 THERAPEUTIC ACTIVITIES: CPT

## 2017-11-29 PROCEDURE — 92507 TX SP LANG VOICE COMM INDIV: CPT

## 2017-11-29 PROCEDURE — 85610 PROTHROMBIN TIME: CPT | Performed by: INTERNAL MEDICINE

## 2017-11-29 PROCEDURE — 74011250637 HC RX REV CODE- 250/637: Performed by: INTERNAL MEDICINE

## 2017-11-29 PROCEDURE — 83880 ASSAY OF NATRIURETIC PEPTIDE: CPT | Performed by: SPECIALIST

## 2017-11-29 PROCEDURE — G8988 SELF CARE GOAL STATUS: HCPCS

## 2017-11-29 PROCEDURE — 97166 OT EVAL MOD COMPLEX 45 MIN: CPT

## 2017-11-29 PROCEDURE — 74011250636 HC RX REV CODE- 250/636: Performed by: INTERNAL MEDICINE

## 2017-11-29 PROCEDURE — 99218 HC RM OBSERVATION: CPT

## 2017-11-29 PROCEDURE — 36415 COLL VENOUS BLD VENIPUNCTURE: CPT | Performed by: INTERNAL MEDICINE

## 2017-11-29 RX ORDER — WARFARIN 2.5 MG/1
1.25 TABLET ORAL ONCE
Status: COMPLETED | OUTPATIENT
Start: 2017-11-29 | End: 2017-11-29

## 2017-11-29 RX ORDER — VALSARTAN 80 MG/1
40 TABLET ORAL
Status: DISCONTINUED | OUTPATIENT
Start: 2017-11-30 | End: 2017-12-01

## 2017-11-29 RX ADMIN — Medication 10 ML: at 22:00

## 2017-11-29 RX ADMIN — BISOPROLOL FUMARATE 5 MG: 5 TABLET, COATED ORAL at 08:33

## 2017-11-29 RX ADMIN — Medication 10 ML: at 16:23

## 2017-11-29 RX ADMIN — VALSARTAN 80 MG: 80 TABLET ORAL at 08:33

## 2017-11-29 RX ADMIN — SPIRONOLACTONE 25 MG: 25 TABLET ORAL at 08:33

## 2017-11-29 RX ADMIN — WARFARIN SODIUM 1.25 MG: 2.5 TABLET ORAL at 17:30

## 2017-11-29 RX ADMIN — PRAVASTATIN SODIUM 40 MG: 20 TABLET ORAL at 23:49

## 2017-11-29 RX ADMIN — TORSEMIDE 10 MG: 20 TABLET ORAL at 08:33

## 2017-11-29 RX ADMIN — Medication 10 ML: at 06:00

## 2017-11-29 RX ADMIN — SODIUM CHLORIDE 75 ML/HR: 900 INJECTION, SOLUTION INTRAVENOUS at 12:17

## 2017-11-29 NOTE — PROGRESS NOTES
[facility] Dosing Services: 11/29/17    Consult for Warfarin Dosing by Pharmacy by Dr. Rik Dawson provided for this 73 y.o.  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)  Day of Therapy: Continued from home (warfarin 2.5 mg TuThSaSu and 1.25 mg MWF)  Dose to achieve an INR goal of 2.5- 3.5    Order entered for  Warfarin  1.25 (mg) ordered to be given today at 18:00. Significant drug interactions:   Previous dose given 2.5mg given on 11/28/17    PT/INR Lab Results   Component Value Date/Time    INR 3.0 11/29/2017 12:47 AM      Platelets Lab Results   Component Value Date/Time    PLATELET 524 96/00/1741 05:04 AM      H/H Lab Results   Component Value Date/Time    HGB 12.8 11/28/2017 05:04 AM        Pharmacy to follow daily and will provide subsequent Warfarin dosing based on clinical status.   Zac Avalos)  Contact information [phone]

## 2017-11-29 NOTE — PROGRESS NOTES
I met with pt & his wife. I gave pt and his wife the stroke education book. Wife greatly appreciated receiving this information. I discussed the likely need for inpatient rehab with pt & his wife. They recently relocated from [place] and only know the area around the hospital.  Their first choice for inpatient rehab is [facility]. Order with clinicals submitted to [facility]. Device has been interrogated. PT/OT will work with pt this pm.    Jane Gilman,  Team B  [phone]

## 2017-11-29 NOTE — ROUTINE PROCESS
TRANSFER - IN REPORT:    Verbal report received from Shi Matt RN (name) on [facility]  being received from ED (unit) for routine progression of care      Report consisted of patients Situation, Background, Assessment and   Recommendations(SBAR). Information from the following report(s) SBAR, Kardex, Intake/Output, MAR, Accordion and Recent Results was reviewed with the receiving nurse. Opportunity for questions and clarification was provided. Assessment completed upon patients arrival to unit and care assumed. Primary Nurse Karo Farfan and Kiya Guthrie RN performed a dual skin assessment on this patient No impairment noted  Patric score is 20      Bedside and Verbal shift change report given to Alaina Trujillo RN (oncoming nurse) by Paul Kasper RN (offgoing nurse). Report included the following information SBAR, Kardex, Intake/Output, MAR, Accordion and Recent Results.

## 2017-11-29 NOTE — PROGRESS NOTES
Problem: Communication Impaired (Adult)  Goal: *Acute Goals and Plan of Care (Insert Text)  Communication goals initiated 17:  1)  Confrontation namin items in 1 min x3 100% by =5=17  2)  Compare/contrast items with intact semantics and syntax by 17  3)  Answer y/n ?'s about paragraph length material 90% by 17  4) functional reading 90% by 17   Speech language pathology treatment  Patient: Carmelo Perez (73 y.o. male)  Date: 2017  Diagnosis: TIA (transient ischemic attack) TIA (transient ischemic attack)         ASSESSMENT:  Patient presenting with moderate integrated language deficits impacting his ability to complete basic reading and problem solving tasks. Decreased attention, concentration,  Probable vision issues, tangentiality noted. He needed constant redirection to task and repetition of task directions. Moderate thought organization deficits. Decreased awareness of deficits. He probably needs some inpatient rehab for vision rehab, integrated language, comprehension deficits. Awaiting PT/OT evals, who are on hold until pacemaker interrogation completed. Progression toward goals:  []       Improving appropriately and progressing toward goals  [x]       Improving slowly and progressing toward goals  []       Not making progress toward goals and plan of care will be adjusted     PLAN:  Patient continues to benefit from skilled intervention to address the above impairments. Continue treatment per established plan of care. Discharge Recommendations:  Inpatient Rehab and Outpatient     SUBJECTIVE:   Patient not aware of issues. OBJECTIVE:   Mental Status:  Neurologic State: Alert  Orientation Level: Oriented X4  Cognition: Appropriate decision making, Appropriate safety awareness, Decreased attention/concentration, Follows commands        Safety/Judgement: Decreased insight into deficits  Treatment & Interventions:   Motor Speech: Language Comprehension and Expression:     Verbal Expression  Primary Mode of Expression: Verbal         conversation was functional for word-retrieval.             READING COMPREHENSION:   Assessed with the Functional REading Section of the RCBA.  40%. He has probable vision issues on lower half and Left vision field. Defer to OT. He had difficulty with sustained attention, initiation, tangentialness,  Need for constant redirection to task. Neuro-Linguistics:      in the functional reading assessment, patient showed difficulty with sustained and divided attention, thought organization, positive for tangentiality,  Difficulty with initiation./   Convergent naming for 2 items:  50%; he needed constant cues to recall goal of task. Often answered with a tangential answer instead of convergent. He had difficulty problem solving how to turn the pages in a packet of papers stapled together. Voice:        Response & Tolerance to Activities:     Pain:  Pain Scale 1: Numeric (0 - 10)  Pain Intensity 1: 0     After treatment:   []       Patient left in no apparent distress sitting up in chair  []       Patient left in no apparent distress in bed  [x]       Call bell left within reach  [x]       Nursing notified  []       Caregiver present  []       Bed alarm activated    COMMUNICATION/COLLABORATION:   Patient was educated regarding His deficit(s) of moderate integrated language deficits as this relates to His diagnosis of possible CVA. He demonstrated Guarded understanding as evidenced by confusion. .    The patients plan of care was discussed with: Physical Therapist, Occupational Therapist and     CASIE Tejeda  Time Calculation: 20 mins

## 2017-11-29 NOTE — PROGRESS NOTES
Problem: Self Care Deficits Care Plan (Adult)  Goal: *Acute Goals and Plan of Care (Insert Text)  Occupational Therapy Goals  Initiated 11/29/2017  1. Patient will perform 3 grooming tasks standing at sink with modified independence without verbal cues within 7 day(s). 2.  Patient will perform lower body dressing without verbal cues with modified independence within 7 day(s). 3.  Patient will perform simple home management with modified independence with good safety awareness within 7 day(s). 4.  Patient will perform toilet transfers with modified independence within 7 day(s). 5.  Patient will perform all aspects of toileting with modified independence within 7 day(s). 6.  Patient will participate in [facility] to assess basic cognition within 7 day(s). 7.  Patient will sequencing 4 step functional command without repetition of instructions with supervision within 7 days(s). Occupational Therapy EVALUATION  Patient: Nannette Betancourt (10 y.o. male)  Date: 11/29/2017  Primary Diagnosis: TIA (transient ischemic attack)        Precautions:Fall       ASSESSMENT :  Based on the objective data described below, the patient presents with impaired functional mobility, standing balance, command follow, safety awareness and mild L inattention with hospitalization on 11/27 for TIA. Hx: CHF, HTN, pacemaker placement. Patient recently moving to [address] [place] [address] from [facility] with wife. Prior to admission he was indep with ADL tasks, home mgmt, and driving. Wife reports confusion prior to bring patient to hospital including removing seat belt while driving. BP stable following toileting. HR stable in 70-80s during session. Patient needed frequent cuing for initiating task and min cues for attention to L side of body. IE. patient went to sink to wash hands following toileting and procedeed to stand and stare at sink. Difficulty following basic commands, asking therapist x 2 to repeat instructions for opening stoke book to page 10. Patient's deficits with inattention and impaired commands following are inconsistent through out session. Demonstrating mild frustration with his inability, often covering with humor. Vision appears intact, able to read therapist nametag and clock. R eye appears to have amblyopia. Tracking intact, saccades difficulty keeping head still. Completed Kassiemacho Otero with 99/42 for LUE, deficits with L wrist stability with elbow in 90 and 0. Provided education on BE FAST with fair understanding with patient and wife. At this time patient needs A for balance aspects of toileting hygiene and clothing mgmt, set up for UB care in sitting, min A for balance aspects of LB dressing with occasional cues to intiate and sequence tasks. He is performing below his baseline and would benefit from inpatient rehab prior to returning home. He is at a high fall risk secondary to impaired balance, impaired cognition, and impaired safety awareness. He is able to tolerate 3 hours of therapy a day and has supportive wife. Patient will benefit from skilled intervention to address the above impairments.   Patients rehabilitation potential is considered to be Good  Factors which may influence rehabilitation potential include:   []                None noted  []                Mental ability/status  [x]                Medical condition  []                Home/family situation and support systems  []                Safety awareness  []                Pain tolerance/management  []                Other:      PLAN :  Recommendations and Planned Interventions:  [x]                  Self Care Training                  [x]           Therapeutic Activities  [x]                  Functional Mobility Training    []           Cognitive Retraining  [x]                  Therapeutic Exercises           [x]           Endurance Activities  [x]                  Balance Training                   [x]           Neuromuscular Re-Education  [] Visual/Perceptual Training     [x]      Home Safety Training  [x]                  Patient Education                 [x]           Family Training/Education  []                  Other (comment):    Frequency/Duration: Patient will be followed by occupational therapy 5 times a week to address goals. Discharge Recommendations: Inpatient Rehab  Further Equipment Recommendations for Discharge: TBD rehab     SUBJECTIVE:   Patient stated This is crazy, I don't know what to do. What did you say?   In regards to asking to patient to turn to page 10    OBJECTIVE DATA SUMMARY:   HISTORY:   Past Medical History:   Diagnosis Date    Aneurysm, thoracic aortic ([address]     Arthritis     Atrial fibrillation ([address]     PAF    [facility]     Cardiomyopathy [facility]     Chronic anticoagulation 11/27/2017    GI bleed     GI bleed 2/17    HTN (hypertension)     Orthostatic hypotension 11/29/2017    Pacemaker 11/28/2017    Medtronic BiV ICD    S/P AVR (aortic valve replacement)     mechanical AVR    Systolic heart failure ([facility]      Past Surgical History:   Procedure Laterality Date    CARDIAC SURG PROCEDURE UNLIST      HX PACEMAKER         Prior Level of Function/Environment/Context: Home with wife. Recently moved to [address] [facility]. Indep with ADL, IADL and driving. Home Situation  Home Environment: Private residence  # Steps to Enter: 4  Rails to Enter: Yes  Hand Rails : Bilateral  One/Two Story Residence: Two story, live on 1st floor  Living Alone: No  Support Systems: Spouse/Significant Other/Partner  Patient Expects to be Discharged to[de-identified] Private residence  Current DME Used/Available at Home: Cane, straight  Tub or Shower Type: Shower  [x]  Right hand dominant   []  Left hand dominant    EXAMINATION OF PERFORMANCE DEFICITS:  Cognitive/Behavioral Status:  Neurologic State: Alert  Orientation Level: Oriented X4  Cognition: Decreased command following;Decreased attention/concentration; Impaired decision making       Skin: uppers none    Edema: uppers none    Hearing: Auditory  Auditory Impairment: None    Vision/Perceptual:    Tracking: Able to track stimulus in all quadrants w/o difficulty (R lazy eye)    Saccades: Additional head turns occurred during testing            Diplopia: No    Acuity: Able to read employee name badge without difficulty; Able to read clock/calendar on wall without difficulty    Corrective Lenses:  (reports cataract surgery last year)    Range of Motion:  AROM: Within functional limits- BUE  PROM: Within functional limits- BUE      Strength:  Strength: Within functional limits       Coordination:     Fine Motor Skills-Upper: Left Intact; Right Intact    Gross Motor Skills-Upper: Left Intact; Right Intact    Tone & Sensation:  Tone: Normal        Balance:  Sitting: Intact  Standing: Impaired  Standing - Static: Good  Standing - Dynamic : Fair    Functional Mobility and Transfers for ADLs:  Bed Mobility:       Transfers:  Sit to Stand: Contact guard assistance  Functional Transfers  Toilet Transfer : Minimum assistance    ADL Assessment:  Feeding: Setup  Oral Facial Hygiene/Grooming: Contact guard assistance (verbal cues to initate task and for mild L sided inattention)  Bathing: Minimum assistance  Upper Body Dressing: Supervision  Lower Body Dressing: Minimum assistance  Toileting: Minimum assistance (for dressing aspect balance)                ADL Intervention and task modifications:   Patient received with PT in bathroom. Provided education on safety with toileting tasks. aptietn with poor awareness to his balance deficits and needing min A for standing aspect of clothing mgmt and hygiene. Amb to sink with CGA without AD. While standing at sink to complete hand hygiene, patient standing staring at sink. Verbal cues needed to initate tasks and then for scanning to find paper towels and soap.   Patient able to easily follow one step command in regards to self care tasks but needing additional verbal cues and time for complete novel tasks such as turning to certain page in stroke ed book. Ed on BE FAST stroke recognition with good understanding       Grooming  Washing Hands: Contact guard assistance (verbal cues to initate and sequencing)    Lower Body Dressing Assistance  Underpants: Minimum assistance    Toileting  Toileting Assistance: Minimum assistance  Bladder Hygiene: Contact guard assistance  Bowel Hygiene: Minimum assistance  Clothing Management: Minimum assistance         Functional Measure:   Fugl-Man Assessment of Motor Recovery after Stroke:     Reflex Activity  Flexors/Biceps/Fingers: Can be elicited  Extensors/Triceps: Can be elicited  Reflex Subtotal: 4    Volitional Movement Within Synergies  Shoulder Retraction: Full  Shoulder Elevation: Full  Shoulder Abduction (90 degrees): Full  Shoulder External Rotation: Full  Elbow Flexion: Full  Forearm Supination: Full  Shoulder Adduction/Internal Rotation: Full  Elbow Extension: Full  Forearm Pronation: Full  Subtotal: 18    Volitional Movement Mixing Synergies  Hand to Lumbar Spine: Full  Shoulder Flexion (0-90 degrees): Full  Pronation-Supination: Full  Subtotal: 6    Volitional Movement With Little or No Synergy  Shoulder Abduction (0-90 degrees): Full  Shoulder Flexion ( degrees): Full  Pronation/Supination: Full  Subtotal : 6    Normal Reflex Activity  Biceps, Triceps, Finger Flexors:  Full  Subtotal : 2    Upper Extremity Total   Upper Extremity Total: 36    Wrist  Stability at 15 Degree Dorsiflexion: Partial  Repeated Dorsiflexion/ Volar Flexion: Full  Stability at 15 Degree Dorsiflexion: Partial  Repeated Dorsiflexion/ Volar Flexion: Full  Circumduction: Full  Wrist Total: 8    Hand  Mass Flexion: Full  Mass Extension: Full  Grasp A: Full  Grasp B: Full  Grasp C: Full  Grasp D: Full  Grasp E: Full  Hand Total: 14    Coordination/Speed  Tremor: None  Dysmetria: None  Time: <1s  Coordination/Speed Total : 6    Total A-D  Total A-D (Motor Function): 64/66     Percentage of impairment CH  0% CI  1-19% CJ  20-39% CK  40-59% CL  60-79% CM  80-99% CN  100%   Fugl-Man score: 0-66 66 53-65 39-52 26-38 13-25 1-12   0      This is a reliable/valid measure of arm function after a neurological event. It has established value to characterize functional status and for measuring spontaneous and therapy-induced recovery; tests proximal and distal motor functions. Fugl-Man Assessment  UE scores recorded between five and 30 days post neurologic event can be used to predict UE recovery at six months post neurologic event. Severe = 0-21 points   Moderately Severe = 22-33 points   Moderate = 34-47 points   Mild = 48-66 points  FRANCES Hughes, BALBIR Motley, & BISHNU Darby (1992). Measurement of motor recovery after stroke: Outcome assessment and sample size requirements.  Stroke, 23, pp. 7933-2836.   ------------------------------------------------------------------------------------------------------------------------------------------------------------------  MCID:  Stroke:   Alonso Parham et al, 2001; n = 171; mean age 79 (5) years; assessed within 16 (12) days of stroke, Acute Stroke)  FMA Motor Scores from Admission to Discharge   10 point increase in FMA Upper Extremity = 1.5 change in discharge FIM   10 point increase in FMA Lower Extremity = 1.9 change in discharge FIM  MDC:   Stroke:   Dai Shepherd et al, 2008, n = 14, mean age = 59.9 (14.6) years, assessed on average 14 (6.5) months post stroke, Chronic Stroke)   FMA = 5.2 points for the Upper Extremity portion of the assessment     Barthel Index:    Bathin  Bladder: 10  Bowels: 10  Groomin  Dressin  Feedin  Mobility: 5  Stairs: 5  Toilet Use: 5  Transfer (Bed to Chair and Back): 10  Total: 60       Barthel and G-code impairment scale:  Percentage of impairment CH  0% CI  1-19% CJ  20-39% CK  40-59% CL  60-79% CM  80-99% CN  100%   Barthel Score 0-100 100 99-80 79-60 59-40 20-39 1-19   0   Barthel Score 0-20 20 17-19 13-16 9-12 5-8 1-4 0      The Barthel ADL Index: Guidelines  1. The index should be used as a record of what a patient does, not as a record of what a patient could do. 2. The main aim is to establish degree of independence from any help, physical or verbal, however minor and for whatever reason. 3. The need for supervision renders the patient not independent. 4. A patient's performance should be established using the best available evidence. Asking the patient, friends/relatives and nurses are the usual sources, but direct observation and common sense are also important. However direct testing is not needed. 5. Usually the patient's performance over the preceding 24-48 hours is important, but occasionally longer periods will be relevant. 6. Middle categories imply that the patient supplies over 50 per cent of the effort. 7. Use of aids to be independent is allowed. Lupillo Cuadra., Barthel, D.W. (6038). Functional evaluation: the Barthel Index. [address] [facility] (14)2. Urmila Wasserman jennifer ELLEN Bowman, Jazzy Garsia., Louis Aburto., Delia Harada, [address] [place] [address] (1999). Measuring the change indisability after inpatient rehabilitation; comparison of the responsiveness of the Barthel Index and Functional [place] Measure. Journal of Neurology, Neurosurgery, and Psychiatry, 66(4), 924-702. Gerardo Saenz, N.J.A, MATEO Mehta, & Mercedes Dietrich, MMayiA. (2004.) Assessment of post-stroke quality of life in cost-effectiveness studies: The usefulness of the Barthel Index and the EuroQoL-5D. Quality of Life Research, 13, 037-31       G codes: In compliance with CMSs Claims Based Outcome Reporting, the following G-code set was chosen for this patient based on their primary functional limitation being treated:     The outcome measure chosen to determine the severity of the functional limitation was the Barthel Index  with a score of 60/100 which was correlated with the impairment scale.    ? Self Care:     - CURRENT STATUS: CJ - 20%-39% impaired, limited or restricted    - GOAL STATUS: CI - 1%-19% impaired, limited or restricted    - D/C STATUS:  ---------------To be determined---------------      Occupational Therapy Evaluation Charge Determination   History Examination Decision-Making   MEDIUM Complexity : Expanded review of history including physical, cognitive and psychosocial  history  MEDIUM Complexity : 3-5 performance deficits relating to physical, cognitive , or psychosocial skils that result in activity limitations and / or participation restrictions MEDIUM Complexity : Patient may present with comorbidities that affect occupational performnce. Miniml to moderate modification of tasks or assistance (eg, physical or verbal ) with assesment(s) is necessary to enable patient to complete evaluation       Based on the above components, the patient evaluation is determined to be of the following complexity level: MEDIUM    Pain:  Pain Scale 1: Numeric (0 - 10)  Pain Intensity 1: 0        Activity Tolerance:   BP stable following toileting. No c/o lightheadedness, no SOB. SpO2 98%, HR 70-80s during session. After treatment:   [x]  Patient left in no apparent distress sitting up in chair  []  Patient left in no apparent distress in bed  [x]  Call bell left within reach  [x]  Nursing notified  [x]  Caregiver present  []  Bed alarm activated- pad under patient, unable to find alarm box. Wife present. Discussed with deandre. COMMUNICATION/EDUCATION:   The patients plan of care was discussed with: Physical Therapist, Registered Nurse, Physician and Pteint. Patient was educated regarding His deficit(s) of L sided inattention, command following, safety and balance as this relates to His diagnosis of TIA. He demonstrated Good understanding as evidenced by participation, supportive wife and .     Patient and/or family was verbally educated on the BE FAST acronym for signs/symptoms of CVA and TIA. Informed patient to refer to the Stroke Binder for further BE FAST information. All questions answered with patient indicating good understanding. [x]    Home safety education was provided and the patient/caregiver indicated understanding. [x]    Patient/family have participated as able in goal setting and plan of care. [x]    Patient/family agree to work toward stated goals and plan of care. []    Patient understands intent and goals of therapy, but is neutral about his/her participation. []    Patient is unable to participate in goal setting and plan of care. This patients plan of care is appropriate for delegation to RAHEL.     Thank you for this referral.  Luis Cm OT  Time Calculation: 38 mins

## 2017-11-29 NOTE — ROUTINE PROCESS
11/29/17   10:13AM  [facility] f/u appt scheduled with Dr. Noe Deras on 12/4/17 at 2:30PM. CM notified and appt added to [phone], [phone] Wilmer Rubio Specialist

## 2017-11-29 NOTE — PROGRESS NOTES
[facility] [address]  [address], [place], [address] [facility]  [phone]      Medical Progress Note      NAME: Oma Arenas   :  1945  MRM:  [id number]    Date/Time: 2017  2:02 PM       Assessment and Plan:   1. TIA (transient ischemic attack) (2017). carotid dopplers and echo: unremarkable. Can't have MRI due to pacer/valve. Continue PT/OT. Neurology evaluation appreciated. 2.  CHF (congestive heart failure) ([facility] (2017), possible diastolic. Normal echo. Continue diuretics, bisporolol and diovan                       3.  HTN (hypertension) (2017). On valsartan and bisporolol                      4.  Elevated serum creatinine (2017). Resolved with IVF. 5.  Chronic afib/ AV repair. Chronic anticoagulation (2017) s/p PPM. Recent PPM placed in [facility]. Cardiology evaluation appreciated. Pacemaker to be checked today. Subjective:     Chief Complaint:  Follow up of pt who was admitted with TIA. Denies weakness. ROS:  (bold if positive, if negative)      Tolerating PT  Tolerating Diet        Objective:     Last 24hrs VS reviewed since prior progress note.  Most recent are:    Visit Vitals    /71 (BP 1 Location: Right arm, BP Patient Position: At rest)    Pulse 73    Temp 98.1 °F (36.7 °C)    Resp 18    Ht 6' 1\" (1.854 m)    Wt 87.5 kg (192 lb 14.4 oz)    SpO2 95%    BMI 25.45 kg/m2     SpO2 Readings from Last 6 Encounters:   17 95%            Intake/Output Summary (Last 24 hours) at 17 0713  Last data filed at 17 1441   Gross per 24 hour   Intake                0 ml   Output              675 ml   Net             -675 ml        Physical Exam:    Gen:  Well-developed, well-nourished, in no acute distress  HEENT:  Pink conjunctivae, PERRL, hearing intact to voice, moist mucous membranes  Neck:  Supple, without masses, thyroid non-tender  Resp:  No accessory muscle use, clear breath sounds without wheezes rales or rhonchi  Card:  No murmurs, normal S1, S2 without thrills, bruits or peripheral edema  Abd:  Soft, non-tender, non-distended, normoactive bowel sounds are present, no palpable organomegaly and no detectable hernias  Lymph:  No cervical or inguinal adenopathy  Musc:  No cyanosis or clubbing  Skin:  No rashes or ulcers, skin turgor is good  Neuro:  Cranial nerves are grossly intact, no focal motor weakness, follows commands appropriately  Psych:  Good insight, oriented to person, place and time, alert  __________________________________________________________________  Medications Reviewed: (see below)  Medications:     Current Facility-Administered Medications   Medication Dose Route Frequency    bisoprolol (ZEBETA) tablet 5 mg  5 mg Oral DAILY    pravastatin (PRAVACHOL) tablet 40 mg  40 mg Oral QHS    spironolactone (ALDACTONE) tablet 25 mg  25 mg Oral DAILY    torsemide (DEMADEX) tablet 10 mg  10 mg Oral DAILY    valsartan (DIOVAN) tablet 80 mg  80 mg Oral DAILY    labetalol (NORMODYNE;TRANDATE) injection 10 mg  10 mg IntraVENous Q4H PRN    0.9% sodium chloride infusion  75 mL/hr IntraVENous CONTINUOUS    sodium chloride (NS) flush 5-10 mL  5-10 mL IntraVENous Q8H    sodium chloride (NS) flush 5-10 mL  5-10 mL IntraVENous PRN    acetaminophen (TYLENOL) tablet 650 mg  650 mg Oral Q4H PRN    oxyCODONE-acetaminophen (PERCOCET) 5-325 mg per tablet 1 Tab  1 Tab Oral Q4H PRN    HYDROmorphone (PF) (DILAUDID) injection 0.5 mg  0.5 mg IntraVENous Q4H PRN    prochlorperazine (COMPAZINE) injection 10 mg  10 mg IntraVENous Q6H PRN    zolpidem (AMBIEN) tablet 5 mg  5 mg Oral QHS PRN    Warfarin - Pharmacy to dose   Other Rx Dosing/Monitoring        Lab Data Reviewed: (see below)  Lab Review:     Recent Labs      11/28/17   0504  11/27/17 2024   WBC  6.2  6.0   HGB  12.8  14.1   HCT  39.9  41.8   PLT [address]      11/29/17 0047 11/28/17   0504  11/27/17 2025 11/27/17 2024   NA   --   139   --   142   K   --   4.5   --   4.7   CL   --   105   --   105   CO2   --   25   --   29   GLU   --   104*   --   102*   BUN   --   20   --   20   CREA   --   1.11   --   1.40*   CA   --   9.3   --   9.7   MG   --   2.0   --    --    PHOS   --   3.7   --    --    ALB   --    --    --   3.9   TBILI   --    --    --   0.8   SGOT   --    --    --   20   ALT   --    --    --   25   INR  3.0*  3.1*  2.7*  3.6*     Lab Results   Component Value Date/Time    Glucose (POC) 98 11/27/2017 08:23 PM    Glucose (POC) 94 11/27/2017 08:08 PM     No results for input(s): PH, PCO2, PO2, HCO3, FIO2 in the last 72 hours. Recent Labs      11/29/17 0047 11/28/17 0504 11/27/17 2025   INR  3.0*  3.1*  2.7*     All Micro Results     None          I have reviewed notes of prior 24hr. Other pertinent lab:       Total time spent with patient: Shaun 59 discussed with: Patient, Family, Nursing Staff and >50% of time spent in counseling and coordination of care    Discussed:  Care Plan    Prophylaxis:  Coumadin    Disposition:  Home w/Family           ___________________________________________________    Attending Physician: Adelfo Urban MD

## 2017-11-29 NOTE — ROUTINE PROCESS
TRANSFER - OUT REPORT:    Verbal report given to Vanessa Griffin RN(name) on [employer]  being transferred to 3rd floor telemetry(unit) for routine progression of care       Report consisted of patients Situation, Background, Assessment and   Recommendations(SBAR). Information from the following report(s) SBAR, ED Summary, Recent Results and Cardiac Rhythm paced was reviewed with the receiving nurse. Lines:   Peripheral IV 11/27/17 Left Antecubital (Active)   Site Assessment Clean, dry, & intact 11/27/2017 11:52 PM   Phlebitis Assessment 0 11/27/2017 11:52 PM   Infiltration Assessment 0 11/27/2017 11:52 PM   Dressing Status Clean, dry, & intact 11/27/2017 11:52 PM   Dressing Type Tape;Transparent 11/27/2017 11:52 PM   Hub Color/Line Status Green 11/27/2017 11:52 PM   Action Taken Open ports on tubing capped 11/27/2017 11:52 PM   Alcohol Cap Used Yes 11/27/2017 11:52 PM        Opportunity for questions and clarification was provided.       Patient transported with:   Monitor  Registered Nurse  Tech

## 2017-11-29 NOTE — PROGRESS NOTES
Problem: Mobility Impaired (Adult and Pediatric)  Goal: *Acute Goals and Plan of Care (Insert Text)  Physical Therapy Goals  Initiated 11/28/2017  1. Patient will move from supine to sit and sit to supine  in bed with modified independence within 7 day(s). 2.  Patient will transfer from bed to chair and chair to bed with modified independence using the least restrictive device within 7 day(s). 3.  Patient will perform sit to stand with modified independence within 7 day(s). 4.  Patient will ambulate with modified independence for 150 feet with the least restrictive device within 7 day(s). 5.  Patient will ascend/descend 4 stairs with B handrail(s) with minimal assistance/contact guard assist within 7 day(s). physical Therapy TREATMENT  Patient: Scotty Aguirre (33 y.o. male)  Date: 11/29/2017  Diagnosis: TIA (transient ischemic attack) TIA (transient ischemic attack)       Precautions:      ASSESSMENT:  Patient continues to present with medical complexity- with orthostatics with nursing earlier. Stable with our PT session. Patient continues to present with decreased safety awareness, decreased attention to task and decreased coordination and sequencing. Patient also presents with mild incoordination of L LE and continued difficulty sequencing tasks appropriately and following commands, requiring verbal cuing and repetitions to maintain on task. Patient able to tolerate completion of BELL balance test, scoring 34/56 indicating a MODERATE fall risk. Patient prior to admission was completely independent and without cognitive deficits. He is not at his baseline, and presents with CVA type presentation without radiographic evidence. He is an excellent rehab candidate and would benefit from all 3 disciplines.  Patient would most benefit from skilled PT to address the above listed deficits and to maximize their functional independence with treatment interventions focused on neuroplasticity with increased repetition, intensity, and specificity to deficits. At discharge for continuing carryover of these principles and for maximal recovery of deficits recommend inpatient rehab. Progression toward goals:  [x]    Improving appropriately and progressing toward goals  []    Improving slowly and progressing toward goals  []    Not making progress toward goals and plan of care will be adjusted     PLAN:  Patient continues to benefit from skilled intervention to address the above impairments. Continue treatment per established plan of care. Discharge Recommendations:  Inpatient Rehab  Further Equipment Recommendations for Discharge:  TBD at rehab     SUBJECTIVE:   Patient stated I'd prefer to do this holding on to something.     OBJECTIVE DATA SUMMARY:   Critical Behavior:  Neurologic State: Alert  Orientation Level: Oriented X4  Cognition: Decreased command following, Decreased attention/concentration, Impaired decision making  Safety/Judgement: Decreased insight into deficits     Vitals:    11/29/17 0700 11/29/17 0746 11/29/17 1147 11/29/17 1611   BP:  121/67 108/67 121/77   BP 1 Location:  Right arm Left arm Left arm   BP Patient Position:  At rest;Supine At rest;Supine Pre-activity  Comment: therapy had just checked   Pulse: 71 71 77 79   Resp:  16 16 16   Temp:  97.8 °F (36.6 °C) 97.9 °F (36.6 °C) 97.6 °F (36.4 °C)   SpO2:  96% 96% 98%   Weight:       Height:           Functional Mobility Training:  Bed Mobility:                    Transfers:  Sit to Stand: Contact guard assistance  Stand to Sit: Contact guard assistance                             Balance:  Sitting: Intact  Standing: Impaired  Standing - Static: Good  Standing - Dynamic : Fair  Ambulation/Gait Training:  Distance (ft): 80 Feet (ft)  Assistive Device: Gait belt  Ambulation - Level of Assistance: Minimal assistance;Contact guard assistance;Assist x1                       Speed/Krysta: Slow  Step Length: Right shortened;Left shortened Stairs:            Neuro Re-Education:  Mojica Balance Test:    Sitting to Standing: 3  Standing Unsupported: 3  Sitting with Back Unsupported: 4  Standing to Sitting: 3  Transfers: 3  Standing Unsupported with Eyes Closed: 3  Standing Unsupported with Feet Together: 2  Reach Forward with Outstretched Arm: 3   Object: 3  Turn to Look Over Shoulders: 4  Turn 360 Degrees: 1  Alternate Foot on Step/Stool: 0  Standing Unsupported One Foot in Front: 2  Stand on One Le  Total: 34         56=Maximum possible score;   0-20=High fall risk  21-40=Moderate fall risk   41-56=Low fall risk     Mojica Balance Test and G-code impairment scale:  Percentage of Impairment CH    0%   CI    1-19% CJ    20-39% CK    40-59% CL    60-79% CM    80-99% CN     100%   Mojica   Score 0-56 56 45-55 34-44 23-33 12-22 1-11 0         Therapeutic Exercises:     Pain:  Pain Scale 1: Numeric (0 - 10)  Pain Intensity 1: 0              Activity Tolerance:   Good - no complications with vitals during upright activity  Please refer to the flowsheet for vital signs taken during this treatment. After treatment:   [x]    Patient left in no apparent distress sitting up in chair  []    Patient left in no apparent distress in bed  [x]    Call bell left within reach  [x]    Nursing notified  [x]    Caregiver present  []    Chair alarm activated (left with OT)    COMMUNICATION/COLLABORATION:   The patients plan of care was discussed with: Occupational Therapist and Registered Nurse    Jenny Tse   Time Calculation: 25 mins    Regarding student involvement in patient care:  A student participated in this treatment session. Per CMS Medicare statements and APTA guidelines I certify that the following was true:  1. I was present and directly observed the entire session. 2. I made all skilled judgments and clinical decisions regarding care. 3. I am the practitioner responsible for assessment, treatment, and documentation.

## 2017-11-29 NOTE — PROGRESS NOTES
Rounded on [facility] patients and provided Anointing of the Sick at request of patient    Fr. Fani Potts

## 2017-11-30 ENCOUNTER — APPOINTMENT (OUTPATIENT)
Dept: CT IMAGING | Age: 72
DRG: 065 | End: 2017-11-30
Attending: NURSE PRACTITIONER
Payer: MEDICARE

## 2017-11-30 LAB
INR PPP: 3.1 (ref 0.9–1.1)
PROTHROMBIN TIME: 32.9 SEC (ref 9–11.1)

## 2017-11-30 PROCEDURE — 74011250637 HC RX REV CODE- 250/637: Performed by: INTERNAL MEDICINE

## 2017-11-30 PROCEDURE — 85610 PROTHROMBIN TIME: CPT | Performed by: INTERNAL MEDICINE

## 2017-11-30 PROCEDURE — 99218 HC RM OBSERVATION: CPT

## 2017-11-30 PROCEDURE — 97116 GAIT TRAINING THERAPY: CPT

## 2017-11-30 PROCEDURE — 97535 SELF CARE MNGMENT TRAINING: CPT

## 2017-11-30 PROCEDURE — 74011250636 HC RX REV CODE- 250/636: Performed by: INTERNAL MEDICINE

## 2017-11-30 PROCEDURE — 74011250637 HC RX REV CODE- 250/637: Performed by: NURSE PRACTITIONER

## 2017-11-30 PROCEDURE — 97530 THERAPEUTIC ACTIVITIES: CPT

## 2017-11-30 PROCEDURE — 36415 COLL VENOUS BLD VENIPUNCTURE: CPT | Performed by: INTERNAL MEDICINE

## 2017-11-30 PROCEDURE — 70450 CT HEAD/BRAIN W/O DYE: CPT

## 2017-11-30 PROCEDURE — 65270000029 HC RM PRIVATE

## 2017-11-30 PROCEDURE — 92507 TX SP LANG VOICE COMM INDIV: CPT

## 2017-11-30 RX ORDER — WARFARIN 2.5 MG/1
2.5 TABLET ORAL ONCE
Status: DISPENSED | OUTPATIENT
Start: 2017-11-30 | End: 2017-12-01

## 2017-11-30 RX ADMIN — SODIUM CHLORIDE 75 ML/HR: 900 INJECTION, SOLUTION INTRAVENOUS at 03:45

## 2017-11-30 RX ADMIN — BISOPROLOL FUMARATE 5 MG: 5 TABLET, COATED ORAL at 08:35

## 2017-11-30 RX ADMIN — PRAVASTATIN SODIUM 40 MG: 20 TABLET ORAL at 22:26

## 2017-11-30 RX ADMIN — SPIRONOLACTONE 25 MG: 25 TABLET ORAL at 08:35

## 2017-11-30 RX ADMIN — Medication 10 ML: at 14:00

## 2017-11-30 RX ADMIN — Medication 10 ML: at 22:40

## 2017-11-30 RX ADMIN — VALSARTAN 40 MG: 80 TABLET, FILM COATED ORAL at 22:26

## 2017-11-30 RX ADMIN — Medication 10 ML: at 07:05

## 2017-11-30 NOTE — PROGRESS NOTES
Problem: Communication Impaired (Adult)  Goal: *Acute Goals and Plan of Care (Insert Text)  Communication goals initiated 17:  1)  Confrontation namin items in 1 min x3 100% by 12=5=17  2)  Compare/contrast items with intact semantics and syntax by 17  3)  Answer y/n ?'s about paragraph length material 90% by 17  4) functional reading 90% by 17   Speech language pathology treatment  Patient: Nancy Bourne (18 y.o. male)  Date: 2017  Diagnosis: TIA (transient ischemic attack) TIA (transient ischemic attack)         ASSESSMENT:  Patient with improved integrated language skills. HE still has some mild-moderate verbal organization deficits, but processing issues are improving. Progression toward goals:  []       Improving appropriately and progressing toward goals  [x]       Improving slowly and progressing toward goals  []       Not making progress toward goals and plan of care will be adjusted     PLAN:  Patient continues to benefit from skilled intervention to address the above impairments. Continue treatment per established plan of care. Discharge Recommendations:  Inpatient Rehab     SUBJECTIVE:   Patient was able to use repetition and make jokes 15 min after the first one about the news. OBJECTIVE:   Mental Status:  Neurologic State: Alert, Appropriate for age  Orientation Level: Oriented X4  Cognition: Appropriate safety awareness, Appropriate for age attention/concentration, Appropriate decision making, Impulsive        Safety/Judgement: Decreased insight into deficits  Treatment & Interventions: Motor Speech: WNL                             Language Comprehension and Expression:     Verbal Expression  Primary Mode of Expression: Verbal      sentence level descriptions:  90%  She had paraphasia.       READING COMPREHENSION:    RCBA sentences with pictures:  90% \"That was misleading\"                                                             Neuro-Linguistics: problem solving:  Describe what's wrong with pictures:  80%; Verbal organization: category sort by 1 or 2 factors:  70%; needed intermittent cues. Responses did improve with practice. Voice:        Response & Tolerance to Activities:     Pain:  Pain Scale 1: Numeric (0 - 10)  Pain Intensity 1: 0     After treatment:   []       Patient left in no apparent distress sitting up in chair  [x]       Patient left in no apparent distress in bed  []       Call bell left within reach  [x]       Nursing notified  []       Caregiver present  []       Bed alarm activated    COMMUNICATION/COLLABORATION:   Patient was educated regarding Her deficit(s) of integrated language issues. as this relates to His diagnosis of TIA/CVA. He demonstrated Fair understanding as evidenced by discussion.  .    The patients plan of care was discussed with: Occupational Therapist    CASIE Barillas  Time Calculation: 25 mins

## 2017-11-30 NOTE — PROGRESS NOTES
[facility]: [address] [facility]rology  [address] [place] [address]  [phone]  YEVGENIY Mulligan    * Inpatient Progress Note *    NAME:  Lito Klein   :  1945  MRN:  [id number]  PCP:    June Bosch, MD  ______________________________________________________________________  *Labs, studies, notes and hospital records were reviewed. -YEVGENIY Carpenter  ______________________________________________________________________  Chart Review :  Addendum  · Note:  Repeat CT head today without acute findings. On ADM, pt had TIA.  ________________________________________________________________  · Discussion with Morteza Hurley RN and Dr. Shelbie Barrios regarding patient still being admitted. Plan to repeat head CT to re-eval for CVA since pt has pacemaker and couldn't have MRI brain at ADM when first CT head didn't show CVA.     · Once CT head performed, will discuss results with pt and wife    · Was found to be orthostatic at admission per Cardiology CX and meds changed  · PT, OT and ST are recommending IP rehab and [facility] is awaiting to offer bed once available  ·  Medications:  Continue Warfarin and Pravachol    ____________________________________________________________________    Collaborating care team physician, Dr. Kourtney Lawton    ____________________________________________________________________    SHERRI MulliganBC

## 2017-11-30 NOTE — PROGRESS NOTES
Nutrition Assessment:    RECOMMENDATIONS/INTERVENTION(S):   Continue Cardiac diet  Monitor PO intakes, wt.     ASSESSMENT:   11/30: 73 yr old male admitted for TIA. PMHx: CHF, HTN. Pt seen 2/2 MST - wt loss. Pt on cardiac diet, continue. Pt appetite is good, ate 75% of meal. Pt family member present. Wt loss was due to diuresis over the last couple months. Appetite has been normal and eating well. No n/v. No chew/swallow difficulties. Family states she is cracking down on diet already. Gave pt TIA MNT handouts. Discussed briefly triglycerides, pt states they are coming down and much better than they used to be. Labs: HDL 24, Trig 311. SUBJECTIVE/OBJECTIVE:   Diet Order: Cardiac  % Eaten:  Patient Vitals for the past 72 hrs:   % Diet Eaten   11/29/17 1847 75 %   11/29/17 1430 15 %     Pertinent Medications: [x] Reviewed    Chemistries:  Lab Results   Component Value Date/Time    Sodium 139 11/28/2017 05:04 AM    Potassium 4.5 11/28/2017 05:04 AM    Chloride 105 11/28/2017 05:04 AM    CO2 25 11/28/2017 05:04 AM    Anion gap 9 11/28/2017 05:04 AM    Glucose 104 11/28/2017 05:04 AM    BUN 20 11/28/2017 05:04 AM    Creatinine 1.11 11/28/2017 05:04 AM    BUN/Creatinine ratio 18 11/28/2017 05:04 AM    GFR est AA >60 11/28/2017 05:04 AM    GFR est non-AA >60 11/28/2017 05:04 AM    Calcium 9.3 11/28/2017 05:04 AM    AST (SGOT) 20 11/27/2017 08:24 PM    Alk. phosphatase 99 11/27/2017 08:24 PM    Protein, total 7.5 11/27/2017 08:24 PM    Albumin 3.9 11/27/2017 08:24 PM    Globulin 3.6 11/27/2017 08:24 PM    A-G Ratio 1.1 11/27/2017 08:24 PM    ALT (SGPT) 25 11/27/2017 08:24 PM      Anthropometrics: Height: 6' 1\" (185.4 cm) Weight: 87.5 kg (192 lb 14.4 oz)    IBW (%IBW):   ( ) UBW (%UBW):   (  %)    BMI: Body mass index is 25.45 kg/(m^2).     This BMI is indicative of:     [] Underweight    [x] Normal    [] Overweight    []  Obesity    []  Extreme Obesity (BMI>40)    Estimated Nutrition Needs (Based on): 6035 Kcals/day (YCO(1459U4.6)) , 88 g (-96g/day(1.0-1.1g/kg)) Protein  Carbohydrate: At Least 130 g/day  Fluids: 2200 mL/day    Last BM: 11/? []Active     []Hyperactive  []Hypoactive       [] Absent   BS- not documented  Skin:    [x] Intact   [] Incision  [] Breakdown   [] DTI   [] Tears/Excoriation/Abrasion  []Edema [] Other:    Wt Readings from Last 30 Encounters:   11/29/17 87.5 kg (192 lb 14.4 oz)      NUTRITION DIAGNOSES:   Problem:   (Less than optimal intake of types of fats)     Etiology: related to Food and nutrition related knowledge deficit concerning type of fat     Signs/Symptoms: as evidenced by Cholesterol < 40 HDL, Triglycerides >150      NUTRITION INTERVENTIONS:  Meals/Snacks: General/healthful diet                  GOAL:   Pt will consume >50% of meals within 3-5 days    Cultural, Synagogue, or Ethnic Dietary Needs: None     LEARNING NEEDS (Diet, Food/Nutrient-Drug Interaction):    [x] None Identified   [] Identified and Education Provided/Documented   [] Identified and Pt declined/was not appropriate      [x] Interdisciplinary Care Plan Reviewed/Documented    [x] Discharge Needs:    TBD   [] No Nutrition Related Discharge Needs    NUTRITION RISK:   Pt Is At Nutrition Risk  [x]     No Nutrition Risk Identified  []       PT SEEN FOR:    []  MD Consult: []Calorie Count      []Diabetic Diet Education        []Diet Education     []Electrolyte Management     []General Nutrition Management and Supplements     []Management of Tube Feeding     []TPN Recommendations    [x]  RN Referral:  [x]MST score >=2     []Enteral/Parenteral Nutrition PTA     []Pregnant: Gestational DM or Multigestation                 [] Pressure Ulcer      []  Low BMI      []  Length of Stay       [] Dysphagia Diet     [] Ventilator      [] Follow-Up        Previous Recommendations:   [] Implemented          [] Not Implemented          [x] Not Applicable    Previous Goal:   [] Met              [] Progressing Towards Goal              [] Not Progressing Towards Goal   [x] Not Applicable              Robson Maldonado, [address] [place] [address]   Pager [phone]

## 2017-11-30 NOTE — ROUTINE PROCESS
Bedside and Verbal shift change report given to David Bird (oncoming nurse) by Alaina Trujillo RN (offgoing nurse). Report included the following information SBAR, Kardex, Intake/Output, MAR, Accordion and Recent Results.

## 2017-11-30 NOTE — PROGRESS NOTES
I met with pt and his wife as a follow-up visit. They are both very hopeful that pt will be accepted into inpatient rehab @ [facility] . I contacted Meche Alex, nurse liason with [facility] who is reviewing pt.'s case. After her  Review, physiatrist will review. Of note,pt has [place] medicare so preauthorization will need to be obtained. Hopefully,[facility] will make a decision today as pt is stable for discharge to inpatient rehab. Nadeem Rosales  Team B-Dr Metzger Done  [phone]    Addendum-I also requested for [facility] to please insure preauthorization process was initiated. Nadeem Rosales

## 2017-11-30 NOTE — PROGRESS NOTES
I have not heard back from [facility] yet regarding acceptance. Clinicals have been also submitted to [facility] preauthorization must be obtained prior to pt going to inpatient rehab.   [address] [facility]  Team B

## 2017-11-30 NOTE — PROGRESS NOTES
[facility] Dosing Services: 11/30/17    Consult for Warfarin Dosing by Pharmacy by Dr. Lurdes Quan provided for this 73 y.o.  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)  Day of Therapy: Continued from home (warfarin 2.5 mg TuThSaSu and 1.25 mg MWF)  Dose to achieve an INR goal of 2.5- 3.5    Order entered for  Warfarin  2.5 (mg) ordered to be given today at 18:00. Significant drug interactions:   Previous dose given 1.25 mg given on 11/29/17    PT/INR Lab Results   Component Value Date/Time    INR 3.1 11/30/2017 12:56 AM      Platelets Lab Results   Component Value Date/Time    PLATELET 167 76/19/8516 05:04 AM      H/H Lab Results   Component Value Date/Time    HGB 12.8 11/28/2017 05:04 AM        Pharmacy to follow daily and will provide subsequent Warfarin dosing based on clinical status.   Zac Maldonado  Contact information [phone]

## 2017-11-30 NOTE — PROGRESS NOTES
Problem: Self Care Deficits Care Plan (Adult)  Goal: *Acute Goals and Plan of Care (Insert Text)  Occupational Therapy Goals  Initiated 11/29/2017  1. Patient will perform 3 grooming tasks standing at sink with modified independence without verbal cues within 7 day(s). 2.  Patient will perform lower body dressing without verbal cues with modified independence within 7 day(s). 3.  Patient will perform simple home management with modified independence with good safety awareness within 7 day(s). 4.  Patient will perform toilet transfers with modified independence within 7 day(s). 5.  Patient will perform all aspects of toileting with modified independence within 7 day(s). 6.  Patient will participate in [facility] to assess basic cognition within 7 day(s). 7.  Patient will sequencing 4 step functional command without repetition of instructions with supervision within 7 days(s). Occupational Therapy TREATMENT  Patient: Travis Rogers (53 y.o. male)  Date: 11/30/2017  Diagnosis: TIA (transient ischemic attack) TIA (transient ischemic attack)       Precautions:  fall  Chart, occupational therapy assessment, plan of care, and goals were reviewed. ASSESSMENT:  Mr. Celestino Barajas was received in the chair, agreeable, and highly motivated for therapy. Patient has made good progress functionally since initial evaluation yesterday. Patient continues to present with decreased attention and difficulty with higher level multi-step commands. He is following 100% of basic commands, does not display any inattention, and able to accurately initiate/terminate functional tasks. With vision testing today, patient with very mildly decreased peripheral field vision however bilateral and saccadic movements noted when tracking R to L in midline. Vision did not negatively impact functional activity today.   Patient continues to be limited by orthostatic hypotension despite application of B LE compression (applied by PT prior to activity) however asymptomatic. He did report transient blurred vision after activity with PT but none present during OT today. Patient tolerated B UE there ex without complaint, good tolerance. He was able to transfer into the bathroom for ADL retraining, specifically toileting and grooming tasks. Patient would benefit from continued skilled OT to progress towards goals and improve overall independence. Progression toward goals:  [x]          Improving appropriately and progressing toward goals  []          Improving slowly and progressing toward goals  []          Not making progress toward goals and plan of care will be adjusted     PLAN:  Patient continues to benefit from skilled intervention to address the above impairments. Continue treatment per established plan of care. Discharge Recommendations:  Inpatient rehab  Further Equipment Recommendations for Discharge:  None at this time     SUBJECTIVE:   Patient stated I feel like I am doing better today.     OBJECTIVE DATA SUMMARY:   Cognitive/Behavioral Status:  Neurologic State: Alert  Orientation Level: Oriented X4  Cognition: Follows commands  Perception: Appears intact  Perseveration: No perseveration noted  Safety/Judgement: Awareness of environment         Functional Mobility and Transfers for ADLs:   Transfers:  Sit to Stand: Contact guard assistance  Functional Transfers  Toilet Transfer : Contact guard assistance    Balance:  Sitting: Intact  Standing: Impaired  Standing - Static: Good  Standing - Dynamic : Fair     ADL Intervention:  Grooming  Grooming Assistance: Supervision/set up  Washing Face: Supervision/set-up  Washing Hands: Supervision/set-up  Cues: Verbal cues provided    Lower Body Dressing Assistance  Dressing Assistance: Supervision/set up  Underpants: Supervision/set-up  Socks: Supervision/set-up  Position Performed: Seated in chair  Cues: Verbal cues provided    Toileting  Toileting Assistance: Supervision/set up  Bladder Hygiene: Supervision/set-up  Bowel Hygiene: Supervision/set-up  Clothing Management: Supervision/set-up  Cues: Verbal cues provided    Cognitive Retraining  Orientation Retraining:  (x4)  Problem Solving: Identifying the problem; Identifying the task  Organizing/Sequencing: Two step sequence; Three step sequence;Prioritizing  Attention to Task: Single task;Multi-task (decreased attention with multi-task)  Following Commands: Follows two step commands/directions; Follows one step commands/directions  Safety/Judgement: Awareness of environment  Cues: Verbal cues provided    Therapeutic Exercises:   Patient educated on the benefits of exercise and encouraged to complete frequently throughout the day as tolerated. Instruction provided for the following exercises: shoulder shrugs, shoulder flexion/extension, chest press/back row, elbow flexion/extension, wrist flexion/extension, finger flexion/extension. Patient tolerated 3 sets x 10-12 reps without complaint and agreeable to complete at minimum 3x/day. Pain:  Pain Scale 1: Numeric (0 - 10)  Pain Intensity 1: 0    Activity Tolerance:    Patient tolerated treatment. Please refer to the flowsheet for vital signs taken during this treatment. After treatment:   [x]  Patient left in no apparent distress sitting up in chair  []  Patient left in no apparent distress in bed  [x]  Call bell left within reach  [x]  Nursing notified  [x]  Caregiver present  [x]  Chair alarm activated    COMMUNICATION/COLLABORATION:   The patients plan of care was discussed with: Physical Therapist, Registered Nurse and patient and wife.     TJ Fong/L  Time Calculation: 41 mins

## 2017-11-30 NOTE — PROGRESS NOTES
Problem: Mobility Impaired (Adult and Pediatric)  Goal: *Acute Goals and Plan of Care (Insert Text)  Physical Therapy Goals  Initiated 11/28/2017  1. Patient will move from supine to sit and sit to supine  in bed with modified independence within 7 day(s). 2.  Patient will transfer from bed to chair and chair to bed with modified independence using the least restrictive device within 7 day(s). 3.  Patient will perform sit to stand with modified independence within 7 day(s). 4.  Patient will ambulate with modified independence for 150 feet with the least restrictive device within 7 day(s). 5.  Patient will ascend/descend 4 stairs with B handrail(s) with minimal assistance/contact guard assist within 7 day(s). physical Therapy TREATMENT  Patient: Nancy Bourne (13 y.o. male)  Date: 11/30/2017  Diagnosis: TIA (transient ischemic attack) TIA (transient ischemic attack)       Precautions:      ASSESSMENT:  Patient received sitting in bedside chair with wife present at bedside, agreeable to PT. Patient completed seated B LE therex, see below for sets and reps. Upon standing, patient's BP orthostatic despite patient's asymptomatic presentation, see vitals below. Returned to sitting for application of ace wrap for compression to B LE, patient again became orthostatic with standing despite added compression. Patient completed standing marches and ambulation with CGA for 80ft to improve patient's BP with attempted muscle activation with upright activity. Patient remained orthostatic post activity with no reported sypmtoms. Returned to bedside chair, chair alarm activated. Patient reported R sided blurry vision with upright activity, describing it as a \"starburst\" with far sighted vision. No abnormalities noted with VOR nor saccades and patient reported vision returned to normal post assessment.  PT will continue to follow patient, recommending inpatient rehab due to patient's continued need for 3 therapies. Progression toward goals:  []    Improving appropriately and progressing toward goals  [x]    Improving slowly and progressing toward goals  []    Not making progress toward goals and plan of care will be adjusted     PLAN:  Patient continues to benefit from skilled intervention to address the above impairments. Continue treatment per established plan of care. Discharge Recommendations:  Inpatient Rehab  Further Equipment Recommendations for Discharge:  TBD at rehab     SUBJECTIVE:   Patient stated I don't feel dizzy.     OBJECTIVE DATA SUMMARY:     Vitals:    11/30/17 1640 11/30/17 1641 11/30/17 1642 11/30/17 1643   BP: 112/69 (!) 89/52 (!) 82/52 (!) 85/51   BP 1 Location: Right arm Right arm Right arm Right arm   BP Patient Position: At rest;Sitting Standing Standing  Comment: Ace wrap compression Standing;Post activity   Pulse: 76 76 76 77   Resp:       Temp:       SpO2:       Weight:       Height:           Critical Behavior:  Neurologic State: Alert, Appropriate for age  Orientation Level: Oriented X4  Cognition: Appropriate safety awareness, Appropriate for age attention/concentration, Appropriate decision making, Impulsive  Safety/Judgement: Decreased insight into deficits  Functional Mobility Training:  Bed Mobility:                    Transfers:  Sit to Stand: Contact guard assistance  Stand to Sit: Contact guard assistance                             Balance:  Sitting: Intact  Standing: Impaired  Standing - Static: Good  Standing - Dynamic : Fair  Ambulation/Gait Training:  Distance (ft): 80 Feet (ft)  Assistive Device: Gait belt  Ambulation - Level of Assistance: Contact guard assistance;Assist x1                       Speed/Krysta: Slow  Step Length: Right shortened;Left shortened                    Stairs:            Neuro Re-Education:    Therapeutic Exercises:   Seated B LE with supervision  1 set x 10 reps ankle pumps  1 set x 10 reps LAQ  1 set x 10 reps marches    Standing B LE with CGA  1 set x 10 reps marches    Pain:  Pain Scale 1: Numeric (0 - 10)  Pain Intensity 1: 0              Activity Tolerance:   Poor - patient becomes orthostatic with upright activity, see vitals  Please refer to the flowsheet for vital signs taken during this treatment. After treatment:   [x]    Patient left in no apparent distress sitting up in chair  []    Patient left in no apparent distress in bed  [x]    Call bell left within reach  [x]    Nursing notified  [x]    Caregiver present  [x]    Chair alarm activated    COMMUNICATION/COLLABORATION:   The patients plan of care was discussed with: Occupational Therapist, Speech Therapist and Registered Nurse    Shimon Verma   Time Calculation: 31 mins    Regarding student involvement in patient care:  A student participated in this treatment session. Per CMS Medicare statements and APTA guidelines I certify that the following was true:  1. I was present and directly observed the entire session. 2. I made all skilled judgments and clinical decisions regarding care. 3. I am the practitioner responsible for assessment, treatment, and documentation.

## 2017-11-30 NOTE — PROGRESS NOTES
Aguilar Cooper giovanny [place] [address]  [address] [facility], [address] [place] [address]  [phone]      Medical Progress Note      NAME: Maria Dolores Temple   :  1945  MRM:  [id number]    Date/Time: 2017  2:02 PM       Assessment and Plan:   1. Possible CVA/ unsteady gait. carotid dopplers and echo: unremarkable. Can't have MRI due to pacer/valve. Recommended inpatient rehab. Continue PT/OT. Neurology evaluation appreciated. Needs repeat CT of the head                         2.  CHF (congestive heart failure) ([facility] [address] (2017), possible diastolic. Normal echo. Continue diuretics, bisporolol and diovan                       3.  HTN (hypertension) (2017). On valsartan and bisporolol                      4.  Elevated serum creatinine (2017). Resolved with IVF. 5.  Chronic afib/ AV repair. Chronic anticoagulation (2017) s/p PPM. Recent PPM placed in [address] [place] [address]. Cardiology evaluation appreciated. Pacemaker was interrogated and is normal functioning. Subjective:     Chief Complaint:  Follow up of pt who was admitted with TIA. Denies weakness. ROS:  (bold if positive, if negative)      Tolerating PT  Tolerating Diet        Objective:     Last 24hrs VS reviewed since prior progress note. Most recent are:    Visit Vitals    /63 (BP 1 Location: Right arm, BP Patient Position: At rest)    Pulse 76    Temp 98.5 °F (36.9 °C)    Resp 20    Ht 6' 1\" (1.854 m)    Wt 87.5 kg (192 lb 14.4 oz)    SpO2 97%    BMI 25.45 kg/m2     SpO2 Readings from Last 6 Encounters:   17 97%            Intake/Output Summary (Last 24 hours) at 17 0728  Last data filed at 17 6558   Gross per 24 hour   Intake          2448. 75 ml   Output              420 ml   Net          2028.75 ml        Physical Exam:    Gen:  Well-developed, well-nourished, in no acute distress  HEENT:  Pink conjunctivae, PERRL, hearing intact to voice, moist mucous membranes  Neck:  Supple, without masses, thyroid non-tender  Resp:  No accessory muscle use, clear breath sounds without wheezes rales or rhonchi  Card:  No murmurs, normal S1, S2 without thrills, bruits or peripheral edema  Abd:  Soft, non-tender, non-distended, normoactive bowel sounds are present, no palpable organomegaly and no detectable hernias  Lymph:  No cervical or inguinal adenopathy  Musc:  No cyanosis or clubbing  Skin:  No rashes or ulcers, skin turgor is good  Neuro:  Cranial nerves are grossly intact, no focal motor weakness, follows commands appropriately  Psych:  Good insight, oriented to person, place and time, alert  __________________________________________________________________  Medications Reviewed: (see below)  Medications:     Current Facility-Administered Medications   Medication Dose Route Frequency    valsartan (DIOVAN) tablet 40 mg  40 mg Oral QHS    bisoprolol (ZEBETA) tablet 5 mg  5 mg Oral DAILY    pravastatin (PRAVACHOL) tablet 40 mg  40 mg Oral QHS    spironolactone (ALDACTONE) tablet 25 mg  25 mg Oral DAILY    labetalol (NORMODYNE;TRANDATE) injection 10 mg  10 mg IntraVENous Q4H PRN    0.9% sodium chloride infusion  75 mL/hr IntraVENous CONTINUOUS    sodium chloride (NS) flush 5-10 mL  5-10 mL IntraVENous Q8H    sodium chloride (NS) flush 5-10 mL  5-10 mL IntraVENous PRN    acetaminophen (TYLENOL) tablet 650 mg  650 mg Oral Q4H PRN    oxyCODONE-acetaminophen (PERCOCET) 5-325 mg per tablet 1 Tab  1 Tab Oral Q4H PRN    HYDROmorphone (PF) (DILAUDID) injection 0.5 mg  0.5 mg IntraVENous Q4H PRN    prochlorperazine (COMPAZINE) injection 10 mg  10 mg IntraVENous Q6H PRN    zolpidem (AMBIEN) tablet 5 mg  5 mg Oral QHS PRN    Warfarin - Pharmacy to dose   Other Rx Dosing/Monitoring        Lab Data Reviewed: (see below)  Lab Review:     Recent Labs      11/28/17   0504  11/27/17 2024   WBC  6.2  6.0   HGB  12.8  14.1   HCT  39.9  41.8 PLT  182  198     Recent Labs      11/30/17 0056 11/29/17 0047 11/28/17   0504   11/27/17 2024   NA   --    --   139   --   142   K   --    --   4.5   --   4.7   CL   --    --   105   --   105   CO2   --    --   25   --   29   GLU   --    --   104*   --   102*   BUN   --    --   20   --   20   CREA   --    --   1.11   --   1.40*   CA   --    --   9.3   --   9.7   MG   --    --   2.0   --    --    PHOS   --    --   3.7   --    --    ALB   --    --    --    --   3.9   TBILI   --    --    --    --   0.8   SGOT   --    --    --    --   20   ALT   --    --    --    --   25   INR  3.1*  3.0*  3.1*   < >  3.6*    < > = values in this interval not displayed. Lab Results   Component Value Date/Time    Glucose (POC) 98 11/27/2017 08:23 PM    Glucose (POC) 94 11/27/2017 08:08 PM     No results for input(s): PH, PCO2, PO2, HCO3, FIO2 in the last 72 hours. Recent Labs      11/30/17 0056 11/29/17 0047 11/28/17 0504   INR  3.1*  3.0*  3.1*     All Micro Results     None          I have reviewed notes of prior 24hr. Other pertinent lab:       Total time spent with patient: [address] discussed with: Patient, Family, Nursing Staff and >50% of time spent in counseling and coordination of care    Discussed:  Care Plan    Prophylaxis:  Coumadin    Disposition:  Home w/Family           ___________________________________________________    Attending Physician: Cora Rondon MD

## 2017-11-30 NOTE — PROGRESS NOTES
0700: Bedside shift change report given to Deidre Pineda (oncoming nurse) by Reyna Perera (offgoing nurse). Report included the following information SBAR, Kardex and Recent Results. Shift Summary:  0730 - 1100: Patient assessed. Up to bathroom with no assistance needed. Patient very steady on feet. Vitals obtained. Discussed plan of care for the day. 1100 - 1500:  1500 - 1900:    1930: Bedside shift change report given to  (oncoming nurse) by Deidre Pineda (offgoing nurse). Report included the following information SBAR, Kardex and Recent Results.

## 2017-12-01 VITALS
OXYGEN SATURATION: 97 % | SYSTOLIC BLOOD PRESSURE: 122 MMHG | DIASTOLIC BLOOD PRESSURE: 75 MMHG | RESPIRATION RATE: 18 BRPM | BODY MASS INDEX: 25.77 KG/M2 | WEIGHT: 194.45 LBS | TEMPERATURE: 97.7 F | HEART RATE: 88 BPM | HEIGHT: 73 IN

## 2017-12-01 PROBLEM — I63.9 CVA (CEREBRAL VASCULAR ACCIDENT) (HCC): Status: ACTIVE | Noted: 2017-12-01

## 2017-12-01 LAB
INR PPP: 2.2 (ref 0.9–1.1)
PROTHROMBIN TIME: 22.2 SEC (ref 9–11.1)

## 2017-12-01 PROCEDURE — 65270000029 HC RM PRIVATE

## 2017-12-01 PROCEDURE — 74011250637 HC RX REV CODE- 250/637: Performed by: INTERNAL MEDICINE

## 2017-12-01 PROCEDURE — 90686 IIV4 VACC NO PRSV 0.5 ML IM: CPT | Performed by: INTERNAL MEDICINE

## 2017-12-01 PROCEDURE — 36415 COLL VENOUS BLD VENIPUNCTURE: CPT | Performed by: INTERNAL MEDICINE

## 2017-12-01 PROCEDURE — 74011250636 HC RX REV CODE- 250/636: Performed by: INTERNAL MEDICINE

## 2017-12-01 PROCEDURE — 99218 HC RM OBSERVATION: CPT

## 2017-12-01 PROCEDURE — 97116 GAIT TRAINING THERAPY: CPT

## 2017-12-01 PROCEDURE — 85610 PROTHROMBIN TIME: CPT | Performed by: INTERNAL MEDICINE

## 2017-12-01 PROCEDURE — 90471 IMMUNIZATION ADMIN: CPT

## 2017-12-01 RX ORDER — WARFARIN 2 MG/1
4 TABLET ORAL ONCE
Status: DISCONTINUED | OUTPATIENT
Start: 2017-12-01 | End: 2017-12-01 | Stop reason: HOSPADM

## 2017-12-01 RX ADMIN — SPIRONOLACTONE 25 MG: 25 TABLET ORAL at 09:39

## 2017-12-01 RX ADMIN — Medication 10 ML: at 06:13

## 2017-12-01 RX ADMIN — BISOPROLOL FUMARATE 5 MG: 5 TABLET, COATED ORAL at 09:39

## 2017-12-01 RX ADMIN — INFLUENZA VIRUS VACCINE 0.5 ML: 15; 15; 15; 15 SUSPENSION INTRAMUSCULAR at 13:18

## 2017-12-01 NOTE — PROGRESS NOTES
Bedside and Verbal shift change report given to 2000 Abby Turner (oncoming nurse) by Jennifer Chio (offgoing nurse). Report included the following information SBAR, Kardex, Procedure Summary, Intake/Output, MAR, Recent Results and Med Rec Status.

## 2017-12-01 NOTE — PROGRESS NOTES
Problem: Mobility Impaired (Adult and Pediatric)  Goal: *Acute Goals and Plan of Care (Insert Text)  Physical Therapy Goals  Initiated 11/28/2017  1. Patient will move from supine to sit and sit to supine  in bed with modified independence within 7 day(s). 2.  Patient will transfer from bed to chair and chair to bed with modified independence using the least restrictive device within 7 day(s). 3.  Patient will perform sit to stand with modified independence within 7 day(s). 4.  Patient will ambulate with modified independence for 150 feet with the least restrictive device within 7 day(s). 5.  Patient will ascend/descend 4 stairs with B handrail(s) with minimal assistance/contact guard assist within 7 day(s). physical Therapy TREATMENT  Patient: Nannette Betancourt (14 y.o. male)  Date: 12/1/2017  Diagnosis: TIA (transient ischemic attack)  CVA (cerebral vascular accident) ([facility] [address] TIA (transient ischemic attack)       Precautions:      ASSESSMENT:  Patient received supine in bed with wife present at bedside, agreeable to PT. Patient with ace wraps to B LE. Patient continues to be orthostatic with standing despite no reported symptoms, see vitals below. However BP stabilizes to baseline with upright activity. Patient independent with transfers, initial CGA to independent with ambulation for 150ft with no LOB, no instability reported. Patient educated regarding additional time for positional changes and benefits of upright activity for improving BP, verbalized understanding. PT will continue to follow throughout his plan of care, recommending outpatient due to improvements noted in patient's problem solving, sequencing, balance.     Progression toward goals:  [x]    Improving appropriately and progressing toward goals  []    Improving slowly and progressing toward goals  []    Not making progress toward goals and plan of care will be adjusted     PLAN:  Patient continues to benefit from skilled intervention to address the above impairments. Continue treatment per established plan of care. Discharge Recommendations:  Outpatient  Further Equipment Recommendations for Discharge:  None     SUBJECTIVE:   Patient stated I don't feel dizzy.     OBJECTIVE DATA SUMMARY:     Vitals:    12/01/17 1154 12/01/17 1155 12/01/17 1157 12/01/17 1305   BP: 115/66 107/71 (!) 79/47 122/75   BP 1 Location: Right arm Right arm Right arm Right arm   BP Patient Position: At rest Sitting Standing Post activity   Pulse: 77 80 76 88   Resp:       Temp:       SpO2:       Weight:       Height:           Critical Behavior:  Neurologic State: Alert, Appropriate for age  Orientation Level: Oriented X4  Cognition: Appropriate decision making  Safety/Judgement: Awareness of environment  Functional Mobility Training:  Bed Mobility:                    Transfers:  Sit to Stand: Independent  Stand to Sit: Independent                             Balance:  Sitting: Intact  Standing: Intact  Ambulation/Gait Training:  Distance (ft): 150 Feet (ft)  Assistive Device: Gait belt  Ambulation - Level of Assistance: Contact guard assistance; Independent                                               Stairs:            Neuro Re-Education:    Therapeutic Exercises:     Pain:  Pain Scale 1: Numeric (0 - 10)  Pain Intensity 1: 0              Activity Tolerance:   Fair - patient orthostatic with standing but able to stabilize with upright activity  Please refer to the flowsheet for vital signs taken during this treatment.   After treatment:   [x]    Patient left in no apparent distress sitting up in chair  []    Patient left in no apparent distress in bed  [x]    Call bell left within reach  [x]    Nursing notified  [x]    Caregiver present  []    Bed alarm activated    COMMUNICATION/COLLABORATION:   The patients plan of care was discussed with: Registered Nurse    Dennis Boyd   Time Calculation: 21 mins    Regarding student involvement in patient care:  A student participated in this treatment session. Per CMS Medicare statements and APTA guidelines I certify that the following was true:  1. I was present and directly observed the entire session. 2. I made all skilled judgments and clinical decisions regarding care. 3. I am the practitioner responsible for assessment, treatment, and documentation.

## 2017-12-01 NOTE — PROGRESS NOTES
I met with pt and his wife to finalize discharge plans. Pt is receiving a script for outpatient PT/OT/speech. PT is printing out the number for [facility] for pt to call to schedule an appointment for pt for neuro-based therapies. Pt has a PCP appointment established with Dr Michelle Santiago set up by our case management specialist for 12/4/17 @ 2:35 pm.I notified TERRY Engel Ma with Dr Tasneem Gallo office regarding discharge.   [employer]  Team B  [phone]

## 2017-12-01 NOTE — PROGRESS NOTES
[facility] Dosing Services: 12/1/17    Consult for Warfarin Dosing by Pharmacy by Dr. Meme Perez provided for this 73 y.o.  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)  Day of Therapy: Continued from home (warfarin 2.5 mg TuThSaSu and 1.25 mg MWF)  Dose to achieve an INR goal of 2.5- 3.5    Order entered for  Warfarin  4 (mg) ordered to be given today at 18:00. No clear reason why INR dropped today as INR has been stable. If dose not given last night (RN unsure if she gave it or not), INR might drop further, so larger dose given today. Significant drug interactions:   Previous dose given No documentation of last nights dose being given, per today's RN, yesterday's RN unsure if she gave dose or not    PT/INR Lab Results   Component Value Date/Time    INR 2.2 12/01/2017 06:05 AM      Platelets Lab Results   Component Value Date/Time    PLATELET 432 37/48/9195 05:04 AM      H/H Lab Results   Component Value Date/Time    HGB 12.8 11/28/2017 05:04 AM        Pharmacy to follow daily and will provide subsequent Warfarin dosing based on clinical status.   Zac Avalos)  Contact information [phone]

## 2017-12-01 NOTE — DISCHARGE INSTRUCTIONS
ACUTE DIAGNOSES:  TIA (transient ischemic attack)  CVA (cerebral vascular accident) [facility])    CHRONIC MEDICAL DIAGNOSES:  Problem List as of 12/1/2017  Date Reviewed: 12/1/2017          Codes Class Noted - Resolved    CVA (cerebral vascular accident) [facility]) ICD-10-CM: I63.9  ICD-9-CM: 434.91  12/1/2017 - Present        Orthostatic hypotension ICD-10-CM: I95.1  ICD-9-CM: 458.0  11/29/2017 - Present        Pacemaker (Chronic) ICD-10-CM: Z95.0  ICD-9-CM: V45.01  11/28/2017 - Present    Overview Signed 11/28/2017 10:55 AM by Kylie Pollack NP     -- unsure if MRI compatible             * (Principal)TIA (transient ischemic attack) ICD-10-CM: G45.9  ICD-9-CM: 435.9  11/27/2017 - Present        CHF (congestive heart failure) (HCC) (Chronic) ICD-10-CM: I50.9  ICD-9-CM: 428.0  11/27/2017 - Present        HTN (hypertension) (Chronic) ICD-10-CM: I10  ICD-9-CM: 401.9  11/27/2017 - Present        Elevated serum creatinine ICD-10-CM: R79.89  ICD-9-CM: 790.99  11/27/2017 - Present        Chronic anticoagulation (Chronic) ICD-10-CM: Z79.01  ICD-9-CM: V58.61  11/27/2017 - Present              DISCHARGE MEDICATIONS:          · It is important that you take the medication exactly as they are prescribed. · Keep your medication in the bottles provided by the pharmacist and keep a list of the medication names, dosages, and times to be taken in your wallet. · Do not take other medications without consulting your doctor. DIET:  Cardiac Diet    ACTIVITY: Activity as tolerated    ADDITIONAL INFORMATION: If you experience any of the following symptoms then please call your primary care physician or return to the emergency room if you cannot get hold of your doctor: Fever, chills, nausea, vomiting, diarrhea, change in mentation, falling, bleeding, shortness of breath. FOLLOW UP CARE:  Primary care physician. you are to call and set up an appointment to see them in 2 weeks.     Follow-up with neurology in 4 weeks   Follow-up with cardiology in 3 weeks    Information obtained by :  I understand that if any problems occur once I am at home I am to contact my physician. I understand and acknowledge receipt of the instructions indicated above.                                                                                                                                            Physician's or R.N.'s Signature                                                                  Date/Time                                                                                                                                              Patient or Representative Signature                                                          Date/Time

## 2017-12-01 NOTE — PROGRESS NOTES
Problem: Falls - Risk of  Goal: *Absence of Falls  Document Anat Fall Risk and appropriate interventions in the flowsheet.    Outcome: Progressing Towards Goal  Fall Risk Interventions:  Mobility Interventions: Bed/chair exit alarm         Medication Interventions: Bed/chair exit alarm    Elimination Interventions: Bed/chair exit alarm, Call light in reach

## 2017-12-01 NOTE — PROGRESS NOTES
Bedside and Verbal shift change report given to Félix Thayer (oncoming nurse) by Eugene Velasco RN (offgoing nurse). Report included the following information SBAR, Kardex, Procedure Summary, Intake/Output, MAR, Accordion and Recent Results.

## 2017-12-01 NOTE — PROGRESS NOTES
The plan is for pt to discharge home today with outpatient PT/OT/speech. Dr Joo Kim will give pt scripts for these disciplines. I will discuss with wife where to go for neuro-based therapies for pt(outpt [facility]). Telma Gary      Nursing,please insure pt has scripts when discharged for outpatient PT/OT/speech.     Telma Gary  Team B  [phone]

## 2017-12-01 NOTE — PHYSICIAN ADVISORY
Letter of Status Determination:   Recommend hospitalization status upgraded from   OBSERVATION  to INPATIENT  Status     Pt Name:  Joy Bass   MR#   [address] [facility] # [id number] /  [id number]   [facility]#  [id number]   Room and Hospital  530/01  @ [facility]   Hospitalization date  11/27/2017  8:10 PM   Current Attending Physician  Gris Casas MD   Principal diagnosis  TIA (transient ischemic attack)      Clinicals  73 y.o. y.o  male hospitalized with above diagnosis   This pt has complex medical issues including hx of HTN, CHF, chronic rate controlled AFib etc.   He presented to the ER with symptoms of neurological damage now classified as TIA. Due to presence of permanent pacemaker he was not able to get an MRI. But, his neurological symptoms persist as noted by the therapists notes. [place] (MCG) criteria   Does   apply Transient Ischemic Attack (TIA)  ORG: M-360 (ISC)    Inpatient admission required[A] rather than observation care   · Focal neurologic signs or symptoms persist or recur. STATUS DETERMINATION  Based on documented presenting clinical data, comorbid conditions, high risk of adverse events and deterioration, it is our recommendation that the patient's status should be upgraded from OBSERVATION to INPATIENT status. The final decision of the patient's hospitalization status depends on the attending physician's judgment. Additional comments     Payor: Hesham Ivan / Plan: Octaviano Hu / Product Type: Managed Care Medicare /         Pineda Hernandez MD MPH FACP     Physician Advisor    [address]   [facility], [address] [place] [address]    Cell  [phone]        [phone]    .

## 2017-12-01 NOTE — PROGRESS NOTES
Aguilar Cooper giovanny [address] 81  [address] [facility], [address] [place] [address]  [phone]      Medical Progress Note      NAME: Cierra Stiles   :  1945  MRM:  [id number]    Date/Time: 2017  2:02 PM       Assessment and Plan:   1. Possible CVA/ unsteady gait. carotid dopplers and echo: unremarkable. Can't have MRI due to pacer/valve. Recommended inpatient rehab. Continue PT/OT. Neurology evaluation appreciated. Repeat CT of the head is unremarkable. Denies by [facility] and pt preferred to do outpatient PT. 2.  CHF (congestive heart failure) ([facility] [address] (2017), possible diastolic. Normal echo. Continue diuretics, bisporolol and diovan                       3.  HTN (hypertension) (2017). Hold valsartan and aldactone due to low BP. On bisporolol                      4.  Elevated serum creatinine (2017). Resolved with IVF. 5.  Chronic afib/ AV repair. Chronic anticoagulation (2017) s/p PPM. Recent PPM placed in [place]. Cardiology evaluation appreciated. Pacemaker was interrogated and is normal functioning. Subjective:     Chief Complaint:  Follow up of pt who was admitted with TIA. Denies weakness. ROS:  (bold if positive, if negative)      Tolerating PT  Tolerating Diet        Objective:     Last 24hrs VS reviewed since prior progress note.  Most recent are:    Visit Vitals    /73 (BP 1 Location: Right arm)    Pulse 71    Temp 97.5 °F (36.4 °C)    Resp 18    Ht 6' 1\" (1.854 m)    Wt 88.2 kg (194 lb 7.1 oz)    SpO2 96%    BMI 25.65 kg/m2     SpO2 Readings from Last 6 Encounters:   17 96%          No intake or output data in the 24 hours ending 17 0953     Physical Exam:    Gen:  Well-developed, well-nourished, in no acute distress  HEENT:  Pink conjunctivae, PERRL, hearing intact to voice, moist mucous membranes  Neck:  Supple, without masses, thyroid non-tender  Resp:  No accessory muscle use, clear breath sounds without wheezes rales or rhonchi  Card:  No murmurs, normal S1, S2 without thrills, bruits or peripheral edema  Abd:  Soft, non-tender, non-distended, normoactive bowel sounds are present, no palpable organomegaly and no detectable hernias  Lymph:  No cervical or inguinal adenopathy  Musc:  No cyanosis or clubbing  Skin:  No rashes or ulcers, skin turgor is good  Neuro:  Cranial nerves are grossly intact, no focal motor weakness, follows commands appropriately  Psych:  Good insight, oriented to person, place and time, alert  __________________________________________________________________  Medications Reviewed: (see below)  Medications:     Current Facility-Administered Medications   Medication Dose Route Frequency    warfarin (COUMADIN) tablet 4 mg  4 mg Oral ONCE    influenza vaccine 2017-18 (3 yrs+)(PF) (FLUZONE QUAD/FLUARIX QUAD) injection 0.5 mL  0.5 mL IntraMUSCular PRIOR TO DISCHARGE    promethazine (PHENERGAN) 25 mg in 0.9% sodium chloride 50 mL IVPB  25 mg IntraVENous Q6H PRN    valsartan (DIOVAN) tablet 40 mg  40 mg Oral QHS    bisoprolol (ZEBETA) tablet 5 mg  5 mg Oral DAILY    pravastatin (PRAVACHOL) tablet 40 mg  40 mg Oral QHS    spironolactone (ALDACTONE) tablet 25 mg  25 mg Oral DAILY    labetalol (NORMODYNE;TRANDATE) injection 10 mg  10 mg IntraVENous Q4H PRN    sodium chloride (NS) flush 5-10 mL  5-10 mL IntraVENous Q8H    sodium chloride (NS) flush 5-10 mL  5-10 mL IntraVENous PRN    acetaminophen (TYLENOL) tablet 650 mg  650 mg Oral Q4H PRN    oxyCODONE-acetaminophen (PERCOCET) 5-325 mg per tablet 1 Tab  1 Tab Oral Q4H PRN    HYDROmorphone (PF) (DILAUDID) injection 0.5 mg  0.5 mg IntraVENous Q4H PRN    zolpidem (AMBIEN) tablet 5 mg  5 mg Oral QHS PRN    Warfarin - Pharmacy to dose   Other Rx Dosing/Monitoring        Lab Data Reviewed: (see below)  Lab Review:     No results for input(s): WBC, HGB, HCT, PLT, HGBEXT, HCTEXT, PLTEXT, HGBEXT, HCTEXT, PLTEXT in the last 72 hours. Recent Labs      12/01/17   0605 11/30/17   0056  11/29/17 0047   INR  2.2*  3.1*  3.0*     Lab Results   Component Value Date/Time    Glucose (POC) 98 11/27/2017 08:23 PM    Glucose (POC) 94 11/27/2017 08:08 PM     No results for input(s): PH, PCO2, PO2, HCO3, FIO2 in the last 72 hours. Recent Labs      12/01/17   0605 11/30/17 0056  11/29/17 0047   INR  2.2*  3.1*  3.0*     All Micro Results     None          I have reviewed notes of prior 24hr. Other pertinent lab:       Total time spent with patient: [address] discussed with: Patient, Family, Nursing Staff and >50% of time spent in counseling and coordination of care    Discussed:  Care Plan    Prophylaxis:  Coumadin    Disposition:  Home w/Family           ___________________________________________________    Attending Physician: Janie Thomas MD

## 2017-12-01 NOTE — DISCHARGE SUMMARY
Hospitalist Discharge Summary     Patient ID:    Areli Lucia  [id number]  73 y.o.  1945    Admit date: 11/27/2017    Discharge date and time: 12/1/2017    Admission Diagnoses: TIA (transient ischemic attack)  CVA (cerebral vascular accident) ([facility] [address]    Chronic Diagnoses:    Problem List as of 12/1/2017  Date Reviewed: 12/1/2017          Codes Class Noted - Resolved    CVA (cerebral vascular accident) [facility]) ICD-10-CM: I63.9  ICD-9-CM: 434.91  12/1/2017 - Present        Orthostatic hypotension ICD-10-CM: I95.1  ICD-9-CM: 458.0  11/29/2017 - Present        Pacemaker (Chronic) ICD-10-CM: Z95.0  ICD-9-CM: V45.01  11/28/2017 - Present    Overview Signed 11/28/2017 10:55 AM by Brody Tyler NP     -- unsure if MRI compatible             * (Principal)TIA (transient ischemic attack) ICD-10-CM: G45.9  ICD-9-CM: 435.9  11/27/2017 - Present        CHF (congestive heart failure) (HCC) (Chronic) ICD-10-CM: I50.9  ICD-9-CM: 428.0  11/27/2017 - Present        HTN (hypertension) (Chronic) ICD-10-CM: I10  ICD-9-CM: 401.9  11/27/2017 - Present        Elevated serum creatinine ICD-10-CM: R79.89  ICD-9-CM: 790.99  11/27/2017 - Present        Chronic anticoagulation (Chronic) ICD-10-CM: Z79.01  ICD-9-CM: V58.61  11/27/2017 - Present              Discharge Medications:   Current Discharge Medication List      CONTINUE these medications which have NOT CHANGED    Details   bisoprolol (ZEBETA) 5 mg tablet Take 5 mg by mouth daily. cholecalciferol, vitamin D3, (VITAMIN D3) 2,000 unit tab Take 2,000 Units by mouth daily. torsemide (DEMADEX) 10 mg tablet Take 10 mg by mouth daily. pravastatin (PRAVACHOL) 40 mg tablet Take 40 mg by mouth nightly. !! warfarin (COUMADIN) 2.5 mg tablet Take 2.5 mg by mouth every Tuesday, Thursday, Saturday & Sunday. 2.5 mg TuThSaSu and 1.25 mg MWF      !! warfarin (COUMADIN) 2.5 mg tablet Take 1.25 mg by mouth every Monday, Wednesday, Friday.  2.5 mg TuThSaSu and 1.25 mg MWF acetaminophen (TYLENOL) 325 mg tablet Take 325 mg by mouth every six (6) hours as needed for Pain. !! - Potential duplicate medications found. Please discuss with provider. STOP taking these medications       valsartan (DIOVAN) 80 mg tablet Comments:   Reason for Stopping:         spironolactone (ALDACTONE) 25 mg tablet Comments:   Reason for Stopping: Follow up Care:    1. June Bosch, MD in 1-2 weeks  2. Diet:  Cardiac Diet    Disposition:  Home. Advanced Directive:    Discharge Exam:  See today's note. CONSULTATIONS: Neurology    Significant Diagnostic Studies:   No results for input(s): WBC, HGB, HCT, PLT, HGBEXT, HCTEXT, PLTEXT in the last 72 hours. No results for input(s): NA, K, CL, CO2, BUN, CREA, GLU, CA, MG, PHOS, URICA in the last 72 hours. No results for input(s): SGOT, GPT, ALT, AP, TBIL, TBILI, TP, ALB, GLOB, GGT, AML, LPSE in the last 72 hours. No lab exists for component: AMYP, HLPSE  Recent Labs      12/01/17   0605  11/30/17   0056  11/29/17   0047   INR  2.2*  3.1*  3.0*   PTP  22.2*  32.9*  31.1*      No results for input(s): FE, TIBC, PSAT, FERR in the last 72 hours. No results for input(s): PH, PCO2, PO2 in the last 72 hours. No results for input(s): CPK, CKMB in the last 72 hours. No lab exists for component: TROPONINI  Lab Results   Component Value Date/Time    Glucose (POC) 98 11/27/2017 08:23 PM    Glucose (POC) 94 11/27/2017 08:08 PM             HOSPITAL COURSE & TREATMENT RENDERED:   1. Possible CVA/ unsteady gait. carotid dopplers and echo: unremarkable. Can't have MRI due to pacer/valve. Recommended inpatient rehab. Continue PT/OT. Neurology evaluation appreciated. Repeat CT of the head is unremarkable. Denies by [facility] inpatient rehab. pt preferred to do outpatient PT.       2. CHF (congestive heart failure) ([facility] [address] (11/27/2017), possible diastolic. Normal echo. Continue diuretics, bisporolol        3. HTN (hypertension) (11/27/2017).  Hold valsartan and aldactone due to low BP. On bisporolol     4.  Elevated serum creatinine (11/27/2017). Resolved with IVF.       5. Chronic afib/ AV repair. Chronic anticoagulation (11/27/2017) s/p PPM. Recent PPM placed in [address] [place] [address]. Cardiology evaluation appreciated. Pacemaker was interrogated and is normal functioning.        Discharged in improved condition           Signed:  Adelfo Urban MD  12/1/2017  11:59 AM

## 2017-12-01 NOTE — PROGRESS NOTES
I received notification from Kelsy Rene that their physiatrist is recommending outpatient PT/OT/speech. I am contacting bertin Mccall with [address] [place] [address] as PT/OT/speech are all recommending inpatient rehab. We actually began our discussion yesterday but will follow-up  this am with attending regarding pt.'s needs regarding inpatient rehab versus outpatient rehab. If outpatient therapies are recommended,pt will need  neuro-based therapists. Thank you.   Amanda Fagan  Team B  [phone]

## 2017-12-04 ENCOUNTER — OFFICE VISIT (OUTPATIENT)
Dept: FAMILY MEDICINE CLINIC | Age: 72
End: 2017-12-04

## 2017-12-04 VITALS
DIASTOLIC BLOOD PRESSURE: 65 MMHG | OXYGEN SATURATION: 98 % | HEIGHT: 73 IN | HEART RATE: 80 BPM | BODY MASS INDEX: 26.64 KG/M2 | RESPIRATION RATE: 16 BRPM | SYSTOLIC BLOOD PRESSURE: 110 MMHG | TEMPERATURE: 98 F | WEIGHT: 201 LBS

## 2017-12-04 DIAGNOSIS — Z95.2 H/O AORTIC VALVE REPLACEMENT: ICD-10-CM

## 2017-12-04 DIAGNOSIS — G45.9 TRANSIENT CEREBRAL ISCHEMIA, UNSPECIFIED TYPE: Primary | ICD-10-CM

## 2017-12-04 DIAGNOSIS — I10 HYPERTENSION, UNSPECIFIED TYPE: Chronic | ICD-10-CM

## 2017-12-04 NOTE — PATIENT INSTRUCTIONS
Transient Ischemic Attack: Care Instructions  Your Care Instructions    A transient ischemic attack (TIA) is when blood flow to a part of your brain is blocked for a short time. A TIA is like a stroke but usually lasts only a few minutes. A TIA does not cause lasting brain damage. Any vision problems, slurred speech, or other symptoms usually go away in 10 to 20 minutes. But they may last for up to 24 hours. TIAs are often warning signs of a stroke. Some people who have a TIA may have a stroke in the future. A stroke can cause symptoms like those of a TIA. But a stroke causes lasting damage to your brain. You can take steps to help prevent a stroke. One thing you can do is get early treatment. If you have other new symptoms, or if your symptoms do not get better, go back to the emergency room or call your doctor right away. Getting treatment right away may prevent long-term brain damage caused by a stroke. The doctor has checked you carefully, but problems can develop later. If you notice any problems or new symptoms, get medical treatment right away. Follow-up care is a key part of your treatment and safety. Be sure to make and go to all appointments, and call your doctor if you are having problems. It's also a good idea to know your test results and keep a list of the medicines you take. How can you care for yourself at home? Medicines  ? · Be safe with medicines. Take your medicines exactly as prescribed. Call your doctor if you think you are having a problem with your medicine. ? · If you take a blood thinner, such as aspirin, be sure you get instructions about how to take your medicine safely. Blood thinners can cause serious bleeding problems. ? · Call your doctor if you are not able to take your medicines for any reason.    ? · Do not take any over-the-counter medicines or herbal products without talking to your doctor first.   ? · If you take birth control pills or hormone therapy, talk to your doctor. Ask if these treatments are right for you. ? Lifestyle changes  ? · Do not smoke. If you need help quitting, talk to your doctor about stop-smoking programs and medicines. ? · Be active. If your doctor recommends it, get more exercise. Walking is a good choice. Bit by bit, increase the amount you walk every day. Try for at least 30 minutes on most days of the week. You also may want to swim, bike, or do other activities. ? · Eat heart-healthy foods. These include fruits, vegetables, high-fiber foods, fish, and foods that are low in sodium, saturated fat, and trans fat. ? · Stay at a healthy weight. Lose weight if you need to.   ? · Limit alcohol to 2 drinks a day for men and 1 drink a day for women. ?Staying healthy  ? · Manage other health problems such as diabetes, high blood pressure, and high cholesterol. ? · Get the flu vaccine every year. When should you call for help? Call 911 anytime you think you may need emergency care. For example, call if:  ? · You have new or worse symptoms of a stroke. These may include:  ¨ Sudden numbness, tingling, weakness, or loss of movement in your face, arm, or leg, especially on only one side of your body. ¨ Sudden vision changes. ¨ Sudden trouble speaking. ¨ Sudden confusion or trouble understanding simple statements. ¨ Sudden problems with walking or balance. ¨ A sudden, severe headache that is different from past headaches. Call 911 even if these symptoms go away in a few minutes. ? · You feel like you are having another TIA. ? Watch closely for changes in your health, and be sure to contact your doctor if you have any problems. Where can you learn more? Go to http://linden-carlton.info/. Enter [phone] [phone] in the search box to learn more about \"Transient Ischemic Attack: Care Instructions. \"  Current as of: March 20, 2017  Content Version: 11.4  © 4325-6207 [employer], [employer].  Care instructions adapted under license by [employer] (which disclaims liability or warranty for this information). If you have questions about a medical condition or this instruction, always ask your healthcare professional. [address] any warranty or liability for your use of this information.

## 2017-12-04 NOTE — PROGRESS NOTES
Chief Complaint   Patient presents with   [facility] Follow Up     PT is being seen for an follow-up visit from Hospital; TIA; PT is doing much better today

## 2017-12-04 NOTE — PROGRESS NOTES
[address]  [address] [address] [place]   [phone]             Date of visit: 12/4/2017   Subjective:      History obtained from:  the patient. Delmis Kellogg is a 73 y.o. male who presents today for transitional care. he was discharged from [facility] on 12/1 /2017. Post-discharge telephone contact was NOT made within 2 business days by our nurse navigator. I have done his medication reconciliation. Discharged on 12/1/2017   Patient was admitted for transient ischemic attack- presented with word finding difficulty. Symptoms improved two days later. During admission Caroid dopplers, CT head and Echo were unremarkable. Unable to obtain MRI since patient has a pacemaker. Repeat CT head- No evidence of acute stroke. Patient did not qualify for inpatient rehab. He was followed by Cardiology and Neurology during the admission. Since discharge, patient reports that he is doing well. Has not had any more episodes of aphasia. Denies any weakness of extremities or change in sensation. Has follow up with PT this week. Patient Active Problem List    Diagnosis Date Noted    H/O aortic valve replacement 12/04/2017    CVA (cerebral vascular accident) ([facility] [address] 12/01/2017    Orthostatic hypotension 11/29/2017    Pacemaker 11/28/2017    TIA (transient ischemic attack) 11/27/2017    CHF (congestive heart failure) ([facility] [address] 11/27/2017    HTN (hypertension) 11/27/2017    Elevated serum creatinine 11/27/2017    Chronic anticoagulation 11/27/2017     Current Outpatient Prescriptions   Medication Sig Dispense Refill    bisoprolol (ZEBETA) 5 mg tablet Take 5 mg by mouth daily.  cholecalciferol, vitamin D3, (VITAMIN D3) 2,000 unit tab Take 2,000 Units by mouth daily.  torsemide (DEMADEX) 10 mg tablet Take 10 mg by mouth daily.  pravastatin (PRAVACHOL) 40 mg tablet Take 40 mg by mouth nightly.       warfarin (COUMADIN) 2.5 mg tablet Take 2.5 mg by mouth every Tuesday, Thursday, Saturday & Sunday. 2.5 mg TuThSaSu and 1.25 mg MWF      warfarin (COUMADIN) 2.5 mg tablet Take 1.25 mg by mouth every Monday, Wednesday, Friday. 2.5 mg TuThSaSu and 1.25 mg MWF      acetaminophen (TYLENOL) 325 mg tablet Take 325 mg by mouth every six (6) hours as needed for Pain. No Known Allergies  Past Medical History:   Diagnosis Date    Aneurysm, thoracic aortic (HCC)     Arthritis     Atrial fibrillation (HCC)     PAF    Cancer (HCC)     Cardiomyopathy ([facility] [address]     Chronic anticoagulation 11/27/2017    GI bleed     GI bleed 2/17    HTN (hypertension)     Orthostatic hypotension 11/29/2017    Pacemaker 11/28/2017    [employer] BiV ICD    S/P AVR (aortic valve replacement)     mechanical AVR    Systolic heart failure (HCC)      Past Surgical History:   Procedure Laterality Date    CARDIAC SURG PROCEDURE UNLIST      HX PACEMAKER       Family History   Problem Relation Age of Onset    Cancer Father      Social History   Substance Use Topics    Smoking status: Never Smoker    Smokeless tobacco: Never Used    Alcohol use No      Social History     Social History Narrative        Review of Systems  General/Constitutional:   No headache, fever, fatigue, weight loss or weight gain       Cardiac:    No chest pain      Respiratory:   No cough or shortness of breath     GI:   No nausea/vomiting, diarrhea  Neurological:   No loss of consciousness, dizziness, seizures  Objective:     Vitals:    12/04/17 1448   BP: 110/65   Pulse: 80   Resp: 16   Temp: 98 °F (36.7 °C)   TempSrc: Oral   SpO2: 98%   Weight: 201 lb (91.2 kg)   Height: 6' 1\" (1.854 m)     Body mass index is 26.52 kg/(m^2). Physical Examination:   GEN: No apparent distress. Alert and oriented and responds to all questions appropriately. OROPHYARYNX: No oral lesions or exudates.   NECK:  Supple; no masses;        LUNGS: Respirations unlabored; clear to auscultation bilaterally  CARDIOVASCULAR: Regular, rate, and rhythm without murmurs, gallops or rubs   ABDOMEN: Soft; nontender; nondistended; normoactive bowel sounds; no masses or organomegaly  NEUROLOGIC:  No focal neurologic deficits. CN's 2-12 intact. Strength and sensation grossly intact. Coordination and gait grossly intact. EXT: Well perfused. No edema. SKIN: No obvious rashes. No results found for: HBA1C, HGBE8, IUW0HLUV, LTU4YJBN, MGH9QDML  Lab Results   Component Value Date/Time    Cholesterol, total 135 11/28/2017 05:04 AM    HDL Cholesterol 24 11/28/2017 05:04 AM    LDL, calculated 48.8 11/28/2017 05:04 AM    VLDL, calculated 62.2 11/28/2017 05:04 AM    Triglyceride 311 11/28/2017 05:04 AM    CHOL/HDL Ratio 5.6 11/28/2017 05:04 AM     Lab Results   Component Value Date/Time    Sodium 139 11/28/2017 05:04 AM    Potassium 4.5 11/28/2017 05:04 AM    Chloride 105 11/28/2017 05:04 AM    CO2 25 11/28/2017 05:04 AM    Anion gap 9 11/28/2017 05:04 AM    Glucose 104 11/28/2017 05:04 AM    BUN 20 11/28/2017 05:04 AM    Creatinine 1.11 11/28/2017 05:04 AM    BUN/Creatinine ratio 18 11/28/2017 05:04 AM    GFR est AA >60 11/28/2017 05:04 AM    GFR est non-AA >60 11/28/2017 05:04 AM    Calcium 9.3 11/28/2017 05:04 AM    Bilirubin, total 0.8 11/27/2017 08:24 PM    AST (SGOT) 20 11/27/2017 08:24 PM    Alk.  phosphatase 99 11/27/2017 08:24 PM    Protein, total 7.5 11/27/2017 08:24 PM    Albumin 3.9 11/27/2017 08:24 PM    Globulin 3.6 11/27/2017 08:24 PM    A-G Ratio 1.1 11/27/2017 08:24 PM    ALT (SGPT) 25 11/27/2017 08:24 PM     Lab Results   Component Value Date/Time    WBC 6.2 11/28/2017 05:04 AM    HGB 12.8 11/28/2017 05:04 AM    HCT 39.9 11/28/2017 05:04 AM    PLATELET 254 18/53/1939 05:04 AM    MCV 89.9 11/28/2017 05:04 AM     No results found for: TSH, TSH2, TSH3, TSHP, TSHELE, TT3, T3U, T3UP, FRT3, FT3, FT4, FT4P, T4, T4P, FT4T, TT7, TSHEXT, TSHEXT  No results found for: RAMON Ernst      Assessment/Plan:   72 yo male who comes in for initial visit. Patient was recently discharged on 12/1 for TIA. ICD-10-CM ICD-9-CM    1. Transient cerebral ischemia, unspecified type G45.9 435.9    2. H/O aortic valve replacement Z95.2 V43.3    3. Hypertension, unspecified type I10 401.9        No orders of the defined types were placed in this encounter. Continue current medications as prescribed  Follow up with Neurology as scheduled on 12/13   Follow up with Cardiology, will discuss about starting ASA  Follow up with Physical Therapy and Speech Therapy on 12/6  Counseled on diet and exercise   Follow up in 1 month for annual physical   Obtain records from prior provider in [place]    Discussed the diagnosis and plan and he expressed understanding.     Follow-up Disposition: Not on Ulysses Em, MD

## 2017-12-07 NOTE — ADT AUTH CERT NOTES
Reason for late notification:    We did not find out until 12/5 that the Doctor wanted to bill the patient's entire stay as Inpatient and not Observation. Patient was originally approved for an Observation stay before Doctor decided he wanted to make the patient Inpatient.

## 2017-12-13 ENCOUNTER — OFFICE VISIT (OUTPATIENT)
Dept: NEUROLOGY | Age: 72
End: 2017-12-13

## 2017-12-13 VITALS
DIASTOLIC BLOOD PRESSURE: 88 MMHG | SYSTOLIC BLOOD PRESSURE: 128 MMHG | HEIGHT: 73 IN | WEIGHT: 195 LBS | BODY MASS INDEX: 25.84 KG/M2

## 2017-12-13 DIAGNOSIS — G45.9 TRANSIENT CEREBRAL ISCHEMIA, UNSPECIFIED TYPE: Primary | ICD-10-CM

## 2017-12-13 RX ORDER — TORSEMIDE 10 MG/1
10 TABLET ORAL DAILY
Qty: 90 TAB | Refills: 1 | Status: SHIPPED | OUTPATIENT
Start: 2017-12-13 | End: 2019-12-12

## 2017-12-13 RX ORDER — WARFARIN 2.5 MG/1
2.5 TABLET ORAL
Qty: 360 TAB | Refills: 1 | Status: SHIPPED | OUTPATIENT
Start: 2017-12-14 | End: 2019-12-12

## 2017-12-13 RX ORDER — WARFARIN 2.5 MG/1
1.25 TABLET ORAL
Qty: 135 TAB | Refills: 1 | Status: SHIPPED | OUTPATIENT
Start: 2017-12-13 | End: 2019-12-12

## 2017-12-13 RX ORDER — BISOPROLOL FUMARATE 5 MG/1
5 TABLET ORAL DAILY
Qty: 90 TAB | Refills: 1 | Status: SHIPPED | OUTPATIENT
Start: 2017-12-13 | End: 2019-12-12

## 2017-12-13 RX ORDER — PRAVASTATIN SODIUM 40 MG/1
40 TABLET ORAL
Qty: 90 TAB | Refills: 1 | Status: SHIPPED | OUTPATIENT
Start: 2017-12-13

## 2017-12-13 NOTE — MR AVS SNAPSHOT
Visit Information Date & Time Provider Department Dept. Phone Encounter #  
 12/13/2017  2:00 PM Briana Shine NP [facility] Neurology [facility] [phone] [id number] Follow-up Instructions Return if symptoms worsen or fail to improve. Upcoming Health Maintenance Date Due Hepatitis C Screening 1945 DTaP/Tdap/Td series (1 - Tdap) 12/27/1968 FOBT Q 1 YEAR AGE 50-75 12/27/1997 ZOSTER VACCINE AGE 60> 10/27/2007 GLAUCOMA SCREENING Q2Y 12/27/2012 Pneumococcal 65+ Low/Medium Risk (1 of 2 - PCV13) 12/27/2012 MEDICARE YEARLY EXAM 12/27/2012 Allergies as of 12/13/2017  Review Complete On: 12/13/2017 By: Wyatt Lyon No Known Allergies Current Immunizations  Never Reviewed Name Date Influenza Vaccine (Quad) PF 12/1/2017  1:18 PM  
  
 Not reviewed this visit You Were Diagnosed With   
  
 Codes Comments Transient cerebral ischemia, unspecified type    -  Primary ICD-10-CM: G45.9 ICD-9-CM: 435.9 Vitals BP Height(growth percentile) Weight(growth percentile) BMI Smoking Status 128/88 6' 1\" (1.854 m) 195 lb (88.5 kg) 25.73 kg/m2 Never Smoker BMI and BSA Data Body Mass Index Body Surface Area 25.73 kg/m 2 2.14 m 2 Preferred Pharmacy Pharmacy Name Phone [address] [facility] [phone] Your Updated Medication List  
  
   
This list is accurate as of: 12/13/17  2:42 PM.  Always use your most recent med list.  
  
  
  
  
 acetaminophen 325 mg tablet Commonly known as:  TYLENOL Take 325 mg by mouth every six (6) hours as needed for Pain. bisoprolol 5 mg tablet Commonly known as:  Longo Ashanti Take 1 Tab by mouth daily. pravastatin 40 mg tablet Commonly known as:  PRAVACHOL Take 1 Tab by mouth nightly. torsemide 10 mg tablet Commonly known as:  DEMADEX Take 1 Tab by mouth daily. VITAMIN D3 2,000 unit Tab Generic drug:  cholecalciferol (vitamin D3) Take 2,000 Units by mouth daily. * warfarin 2.5 mg tablet Commonly known as:  COUMADIN Take 0.5 Tabs by mouth every Monday, Wednesday, Friday. 2.5 mg TuThSaSu and 1.25 mg MWF * warfarin 2.5 mg tablet Commonly known as:  COUMADIN Take 1 Tab by mouth every Tuesday, Thursday, Saturday & Sunday. 2.5 mg TuThSaSu and 1.25 mg MWF Start taking on:  12/14/2017 * Notice: This list has 2 medication(s) that are the same as other medications prescribed for you. Read the directions carefully, and ask your doctor or other care provider to review them with you. Prescriptions Sent to Pharmacy Refills  
 bisoprolol (ZEBETA) 5 mg tablet 1 Sig: Take 1 Tab by mouth daily. Class: Normal  
 Pharmacy: [address], [address] Ph #: [phone] Route: Oral  
 torsemide (DEMADEX) 10 mg tablet 1 Sig: Take 1 Tab by mouth daily. Class: Normal  
 Pharmacy: [address], [address] Ph #: [phone] Route: Oral  
 pravastatin (PRAVACHOL) 40 mg tablet 1 Sig: Take 1 Tab by mouth nightly. Class: Normal  
 Pharmacy: [address], [address] Ph #: [phone] Route: Oral  
 warfarin (COUMADIN) 2.5 mg tablet 1 Starting on: 12/14/2017 Sig: Take 1 Tab by mouth every Tuesday, Thursday, Saturday & Sunday. 2.5 mg TuThSaSu and 1.25 mg MWF Class: Normal  
 Pharmacy: [address], [address] Ph #: [phone] Route: Oral  
 warfarin (COUMADIN) 2.5 mg tablet 1 Sig: Take 0.5 Tabs by mouth every Monday, Wednesday, Friday. 2.5 mg TuThSaSu and 1.25 mg MWF Class: Normal  
 Pharmacy: [address], [address] Ph #: [phone] Route: Oral  
  
Follow-up Instructions Return if symptoms worsen or fail to improve. Patient Instructions PRESCRIPTION REFILL POLICY [facility] Statement to Patients April 1, 2014 In an effort to ensure the large volume of patient prescription refills is processed in the most efficient and expeditious manner, we are asking our patients to assist us by calling your Pharmacy for all prescription refills, this will include also your  Mail Order Pharmacy. The pharmacy will contact our office electronically to continue the refill process. Please do not wait until the last minute to call your pharmacy. We need at least 48 hours (2days) to fill prescriptions. We also encourage you to call your pharmacy before going to  your prescription to make sure it is ready. With regard to controlled substance prescription refill requests (narcotic refills) that need to be picked up at our office, we ask your cooperation by providing us with at least 72 hours (3days) notice that you will need a refill. We will not refill narcotic prescription refill requests after 4:00pm on any weekday, Monday through Thursday, or after 2:00pm on Fridays, or on the weekends. We encourage everyone to explore another way of getting your prescription refill request processed using "[employer] our patient web portal through our electronic medical record system. "[employer] is an efficient and effective way to communicate your medication request directly to the office and  downloadable as an alberto on your smart phone . "[employer] also features a review functionality that allows you to view your medication list as well as leave messages for your physician. Are you ready to get connected? If so please review the attatched instructions or speak to any of our staff to get you set up right away! Thank you so much for your cooperation. Should you have any questions please contact our Practice Administrator. The Physicians and Staff,  [facility] Introducing [facility] [place] [facility]! [employer] introduces [employer] patient portal. Now you can access parts of your medical record, email your doctor's office, and request medication refills online. 1. In your internet browser, go to [email or web address] 2. Click on the First Time User? Click Here link in the Sign In box. You will see the New Member Sign Up page. 3. Enter your Sun Number Access Code exactly as it appears below. You will not need to use this code after youve completed the sign-up process. If you do not sign up before the expiration date, you must request a new code. · Sun [employer] Access Code: [email or web address] Expires: 2/27/2018 11:29 AM 
 
4. Enter the last four digits of your Social Security Number (xxxx) and Date of Birth (mm/dd/yyyy) as indicated and click Submit. You will be taken to the next sign-up page. 5. Create a Sun Number ID. This will be your Sun Number login ID and cannot be changed, so think of one that is secure and easy to remember. 6. Create a [employer] password. You can change your password at any time. 7. Enter your Password Reset Question and Answer. This can be used at a later time if you forget your password. 8. Enter your e-mail address. You will receive e-mail notification when new information is available in [address] 9. Click Sign Up. You can now view and download portions of your medical record. 10. Click the Download Summary menu link to download a portable copy of your medical information. If you have questions, please visit the Frequently Asked Questions section of the [employer] website. Remember, [employer] is NOT to be used for urgent needs. For medical emergencies, dial 911. Now available from your iPhone and Android! Please provide this summary of care documentation to your next provider. Your primary care clinician is listed as Phys Other.  If you have any questions after today's visit, please call [phone].

## 2017-12-13 NOTE — PATIENT INSTRUCTIONS
[facility]   Statement to Patients  April 1, 2014      In an effort to ensure the large volume of patient prescription refills is processed in the most efficient and expeditious manner, we are asking our patients to assist us by calling your Pharmacy for all prescription refills, this will include also your  Mail Order Pharmacy. The pharmacy will contact our office electronically to continue the refill process. Please do not wait until the last minute to call your pharmacy. We need at least 48 hours (2days) to fill prescriptions. We also encourage you to call your pharmacy before going to  your prescription to make sure it is ready. With regard to controlled substance prescription refill requests (narcotic refills) that need to be picked up at our office, we ask your cooperation by providing us with at least 72 hours (3days) notice that you will need a refill. We will not refill narcotic prescription refill requests after 4:00pm on any weekday, Monday through Thursday, or after 2:00pm on Fridays, or on the weekends. We encourage everyone to explore another way of getting your prescription refill request processed using [employer], our patient web portal through our electronic medical record system. [employer] is an efficient and effective way to communicate your medication request directly to the office and  downloadable as an alberto on your smart phone . [employer] also features a review functionality that allows you to view your medication list as well as leave messages for your physician. Are you ready to get connected? If so please review the attatched instructions or speak to any of our staff to get you set up right away! Thank you so much for your cooperation. Should you have any questions please contact our Practice Administrator.     The Physicians and Staff,  [facility]

## 2017-12-15 NOTE — PROGRESS NOTES
Stacia Wills is a 73 y.o. male who presents with the following  Chief Complaint   Patient presents with   [facility] Follow Up       HPI Patient comes in for a follow up for TIA. States he is doing well since discharge and has no complaints right now. He is currently on coumadin and pravastatin. He is following with Dr. Black Chaparro for his afib and defibillator and pacemaker. He is just moving down here form [place] and is establishing with local PCP and needs refills for today. No other complaints. No Known Allergies    Current Outpatient Prescriptions   Medication Sig    bisoprolol (ZEBETA) 5 mg tablet Take 1 Tab by mouth daily.  torsemide (DEMADEX) 10 mg tablet Take 1 Tab by mouth daily.  pravastatin (PRAVACHOL) 40 mg tablet Take 1 Tab by mouth nightly.  warfarin (COUMADIN) 2.5 mg tablet Take 1 Tab by mouth every Tuesday, Thursday, Saturday & Sunday. 2.5 mg TuThSaSu and 1.25 mg MWF    warfarin (COUMADIN) 2.5 mg tablet Take 0.5 Tabs by mouth every Monday, Wednesday, Friday. 2.5 mg TuThSaSu and 1.25 mg MWF    cholecalciferol, vitamin D3, (VITAMIN D3) 2,000 unit tab Take 2,000 Units by mouth daily.  acetaminophen (TYLENOL) 325 mg tablet Take 325 mg by mouth every six (6) hours as needed for Pain. No current facility-administered medications for this visit.         History   Smoking Status    Never Smoker   Smokeless Tobacco    Never Used       Past Medical History:   Diagnosis Date    Aneurysm, thoracic aortic (HCC)     Arthritis     Atrial fibrillation (HCC)     PAF    Cancer (HCC)     Cardiomyopathy ([address]     Chronic anticoagulation 11/27/2017    GI bleed     GI bleed 2/17    HTN (hypertension)     Orthostatic hypotension 11/29/2017    Pacemaker 11/28/2017    [employer] BiV ICD    S/P AVR (aortic valve replacement)     mechanical AVR    Stroke ([address]     Systolic heart failure ([address]        Past Surgical History:   Procedure Laterality Date    CARDIAC SURG PROCEDURE UNLIST      HX PACEMAKER         Family History   Problem Relation Age of Onset    Cancer Father        Social History     Social History    Marital status:      Spouse name: N/A    Number of children: N/A    Years of education: N/A     Social History Main Topics    Smoking status: Never Smoker    Smokeless tobacco: Never Used    Alcohol use No    Drug use: No    Sexual activity: Yes     Other Topics Concern    None     Social History Narrative       Review of Systems   HENT: Negative for ear pain, hearing loss and tinnitus. Eyes: Negative for blurred vision, double vision and photophobia. Respiratory: Negative for shortness of breath and wheezing. Cardiovascular: Negative for chest pain and palpitations. Gastrointestinal: Negative for nausea and vomiting. Neurological: Negative for dizziness, tingling, seizures, loss of consciousness, weakness and headaches. Remainder of comprehensive review is negative. Physical Exam :    Visit Vitals    /88    Ht 6' 1\" (1.854 m)    Wt 88.5 kg (195 lb)    BMI 25.73 kg/m2       General: Well defined, nourished, and groomed individual in no acute distress.    Neck: Supple, nontender, no bruits, no pain with resistance to active range of motion.    Heart: Regular rate and rhythm, no murmurs, rub, or gallop. Normal S1S2. Lungs: Clear to auscultation bilaterally with equal chest expansion, no cough, no wheeze  Musculoskeletal: Extremities revealed no edema and had full range of motion of joints.    Psych: Good mood and bright affect    NEUROLOGICAL EXAMINATION:    Mental Status: Alert and oriented to person, place, and time    Cranial Nerves:    II, III, IV, VI: Visual acuity grossly intact. Visual fields are normal.    Pupils are equal, round, and reactive to light and accommodation.    Extra-ocular movements are full and fluid. Fundoscopic exam was benign, no ptosis or nystagmus.    V-XII: Hearing is grossly intact.  Facial features are symmetric, with normal sensation and strength. The palate rises symmetrically and the tongue protrudes midline. Sternocleidomastoids 5/5. Motor Examination: Normal tone, bulk, and strength, 5/5 muscle strength throughout. Coordination: Finger to nose was normal. No resting or intention tremor    Gait and Station: Steady while walking. Normal arm swing. No pronator drift. No muscle wasting or fasiculations noted. Reflexes: DTRs 2+ throughout. Results for orders placed or performed during the hospital encounter of 11/27/17   CBC WITH AUTOMATED DIFF   Result Value Ref Range    WBC 6.0 4.1 - 11.1 K/uL    RBC 4.72 4.10 - 5.70 M/uL    HGB 14.1 12.1 - 17.0 g/dL    HCT 41.8 36.6 - 50.3 %    MCV 88.6 80.0 - 99.0 FL    MCH 29.9 26.0 - 34.0 PG    MCHC 33.7 30.0 - 36.5 g/dL    RDW 19.1 (H) 11.5 - 14.5 %    PLATELET 685 605 - 605 K/uL    NEUTROPHILS 50 32 - 75 %    LYMPHOCYTES 42 12 - 49 %    MONOCYTES 8 5 - 13 %    EOSINOPHILS 0 0 - 7 %    BASOPHILS 0 0 - 1 %    ABS. NEUTROPHILS 3.0 1.8 - 8.0 K/UL    ABS. LYMPHOCYTES 2.5 0.8 - 3.5 K/UL    ABS. MONOCYTES 0.5 0.0 - 1.0 K/UL    ABS. EOSINOPHILS 0.0 0.0 - 0.4 K/UL    ABS. BASOPHILS 0.0 0.0 - 0.1 K/UL    DF MANUAL      RBC COMMENTS ANISOCYTOSIS  1+        RBC COMMENTS OVALOCYTES  PRESENT        RBC COMMENTS TEARDROP CELLS  PRESENT        WBC COMMENTS REACTIVE LYMPHS     METABOLIC PANEL, COMPREHENSIVE   Result Value Ref Range    Sodium 142 136 - 145 mmol/L    Potassium 4.7 3.5 - 5.1 mmol/L    Chloride 105 97 - 108 mmol/L    CO2 29 21 - 32 mmol/L    Anion gap 8 5 - 15 mmol/L    Glucose 102 (H) 65 - 100 mg/dL    BUN 20 6 - 20 MG/DL    Creatinine 1.40 (H) 0.70 - 1.30 MG/DL    BUN/Creatinine ratio 14 12 - 20      GFR est AA >60 >60 ml/min/1.73m2    GFR est non-AA 50 (L) >60 ml/min/1.73m2    Calcium 9.7 8.5 - 10.1 MG/DL    Bilirubin, total 0.8 0.2 - 1.0 MG/DL    ALT (SGPT) 25 12 - 78 U/L    AST (SGOT) 20 15 - 37 U/L    Alk.  phosphatase 99 45 - 117 U/L    Protein, total 7.5 6.4 - 8.2 g/dL    Albumin 3.9 3.5 - 5.0 g/dL    Globulin 3.6 2.0 - 4.0 g/dL    A-G Ratio 1.1 1.1 - 2.2     TROPONIN I   Result Value Ref Range    Troponin-I, Qt. <0.04 <0.05 ng/mL   PROTHROMBIN TIME + INR   Result Value Ref Range    INR 3.6 (H) 0.9 - 1.1      Prothrombin time 37.8 (H) 9.0 - 11.1 sec   PTT   Result Value Ref Range    aPTT 51.1 (H) 22.1 - 32.5 sec    aPTT, therapeutic range     58.0 - 77.0 SECS   PROTHROMBIN TIME + INR   Result Value Ref Range    INR 3.1 (H) 0.9 - 1.1      Prothrombin time 32.1 (H) 9.0 - 11.1 sec   CBC W/O DIFF   Result Value Ref Range    WBC 6.2 4.1 - 11.1 K/uL    RBC 4.44 4.10 - 5.70 M/uL    HGB 12.8 12.1 - 17.0 g/dL    HCT 39.9 36.6 - 50.3 %    MCV 89.9 80.0 - 99.0 FL    MCH 28.8 26.0 - 34.0 PG    MCHC 32.1 30.0 - 36.5 g/dL    RDW 19.1 (H) 11.5 - 14.5 %    PLATELET 743 072 - 563 K/uL   MAGNESIUM   Result Value Ref Range    Magnesium 2.0 1.6 - 2.4 mg/dL   METABOLIC PANEL, BASIC   Result Value Ref Range    Sodium 139 136 - 145 mmol/L    Potassium 4.5 3.5 - 5.1 mmol/L    Chloride 105 97 - 108 mmol/L    CO2 25 21 - 32 mmol/L    Anion gap 9 5 - 15 mmol/L    Glucose 104 (H) 65 - 100 mg/dL    BUN 20 6 - 20 MG/DL    Creatinine 1.11 0.70 - 1.30 MG/DL    BUN/Creatinine ratio 18 12 - 20      GFR est AA >60 >60 ml/min/1.73m2    GFR est non-AA >60 >60 ml/min/1.73m2    Calcium 9.3 8.5 - 10.1 MG/DL   PHOSPHORUS   Result Value Ref Range    Phosphorus 3.7 2.6 - 4.7 MG/DL   LIPID PANEL   Result Value Ref Range    LIPID PROFILE          Cholesterol, total 135 <200 MG/DL    Triglyceride 311 (H) <150 MG/DL    HDL Cholesterol 24 MG/DL    LDL, calculated 48.8 0 - 100 MG/DL    VLDL, calculated 62.2 MG/DL    CHOL/HDL Ratio 5.6 (H) 0 - 5.0     PROTHROMBIN TIME + INR   Result Value Ref Range    INR 3.0 (H) 0.9 - 1.1      Prothrombin time 31.1 (H) 9.0 - 11.1 sec   NT-PRO BNP   Result Value Ref Range    NT pro-BNP 1815 (H) 0 - 125 PG/ML   PROTHROMBIN TIME + INR   Result Value Ref Range    INR 3.1 (H) 0.9 - 1.1 Prothrombin time 32.9 (H) 9.0 - 11.1 sec   PROTHROMBIN TIME + INR   Result Value Ref Range    INR 2.2 (H) 0.9 - 1.1      Prothrombin time 22.2 (H) 9.0 - 11.1 sec   GLUCOSE, POC   Result Value Ref Range    Glucose (POC) 94 65 - 100 mg/dL    Performed by MAGDALENA AGUIRRE    GLUCOSE, POC   Result Value Ref Range    Glucose (POC) 98 65 - 100 mg/dL    Performed by Gregorio Motley    POC INR   Result Value Ref Range    INR (POC) 2.7 (H) <1.2     EKG, 12 LEAD, INITIAL   Result Value Ref Range    Ventricular Rate 75 BPM    Atrial Rate 75 BPM    QRS Duration 192 ms    Q-T Interval 464 ms    QTC Calculation (Bezet) 518 ms    Calculated R Axis 151 degrees    Calculated T Axis -37 degrees    Diagnosis       Ventricular-paced rhythm  premature ventricular complexes  Abnormal ECG  No previous ECGs available  Confirmed by Neal Hansen MD., Anne Backers ([id number]) on 11/28/2017 11:20:52 PM         Orders Placed This Encounter    bisoprolol (ZEBETA) 5 mg tablet     Sig: Take 1 Tab by mouth daily. Dispense:  90 Tab     Refill:  1    torsemide (DEMADEX) 10 mg tablet     Sig: Take 1 Tab by mouth daily. Dispense:  90 Tab     Refill:  1    pravastatin (PRAVACHOL) 40 mg tablet     Sig: Take 1 Tab by mouth nightly. Dispense:  90 Tab     Refill:  1    warfarin (COUMADIN) 2.5 mg tablet     Sig: Take 1 Tab by mouth every Tuesday, Thursday, Saturday & Sunday. 2.5 mg TuThSaSu and 1.25 mg MWF     Dispense:  360 Tab     Refill:  1    warfarin (COUMADIN) 2.5 mg tablet     Sig: Take 0.5 Tabs by mouth every Monday, Wednesday, Friday. 2.5 mg TuThSaSu and 1.25 mg MWF     Dispense:  135 Tab     Refill:  1       1. Transient cerebral ischemia, unspecified type        Follow-up Disposition:  Return if symptoms worsen or fail to improve. TIA. Most likely A fib related. Continue Coumadin, Pravastatin. Getting in with cardiac for his heart and defib and pacemaker. Keep LDL below 70 per stroke guidelines.  He understands the s/s of a stroke and when to call 261.         This note will not be viewable in [employer]

## 2018-05-29 ENCOUNTER — HOSPITAL ENCOUNTER (OUTPATIENT)
Dept: MAMMOGRAPHY | Age: 73
Discharge: HOME | End: 2018-05-29
Attending: FAMILY MEDICINE
Payer: MEDICARE

## 2018-05-29 DIAGNOSIS — N63.10 BREAST MASS, RIGHT: ICD-10-CM

## 2018-05-29 PROCEDURE — 77066 DX MAMMO INCL CAD BI: CPT

## 2019-09-27 NOTE — MR AVS SNAPSHOT
Visit Information Date & Time Provider Department Dept. Phone Encounter #  
 12/4/2017  2:35 PM Georgie Reagan  [address] [phone] [id number] Follow-up Instructions Return in about 1 month (around 1/4/2018). Your Appointments 12/13/2017  2:00 PM  
Any with Kevin Moreno NP [address] ([address]) Appt Note: NP post-tia/cva per [facility] EMILY 11/28/17  
 [place] [address] [facility] [phone]  
  
   
 [place] [address] [id number] [address] Upcoming Health Maintenance Date Due Hepatitis C Screening 1945 DTaP/Tdap/Td series (1 - Tdap) 12/27/1968 FOBT Q 1 YEAR AGE 50-75 12/27/1997 ZOSTER VACCINE AGE 60> 10/27/2007 GLAUCOMA SCREENING Q2Y 12/27/2012 Pneumococcal 65+ Low/Medium Risk (1 of 2 - PCV13) 12/27/2012 MEDICARE YEARLY EXAM 12/27/2012 Allergies as of 12/4/2017  Review Complete On: 11/27/2017 By: Raheel Duron RN No Known Allergies Current Immunizations  Never Reviewed Name Date Influenza Vaccine (Quad) PF 12/1/2017  1:18 PM  
  
 Not reviewed this visit You Were Diagnosed With   
  
 Codes Comments Transient cerebral ischemia, unspecified type    -  Primary ICD-10-CM: G45.9 ICD-9-CM: 435.9 H/O aortic valve replacement     ICD-10-CM: Z95.2 ICD-9-CM: V43.3 Hypertension, unspecified type     ICD-10-CM: I10 
ICD-9-CM: 401.9 Vitals BP Pulse Temp Resp Height(growth percentile) Weight(growth percentile) 110/65 (BP 1 Location: Left arm, BP Patient Position: Sitting) 80 98 °F (36.7 °C) (Oral) 16 6' 1\" (1.854 m) 201 lb (91.2 kg) SpO2 BMI Smoking Status 98% 26.52 kg/m2 Never Smoker BMI and BSA Data Body Mass Index Body Surface Area  
 26.52 kg/m 2 2.17 m 2 Your Updated Medication List  
  
   
 This list is accurate as of: 12/4/17  3:42 PM.  Always use your most recent med list.  
  
  
  
  
 acetaminophen 325 mg tablet Commonly known as:  TYLENOL Take 325 mg by mouth every six (6) hours as needed for Pain. bisoprolol 5 mg tablet Commonly known as:  Chuckie Kaye Take 5 mg by mouth daily. pravastatin 40 mg tablet Commonly known as:  PRAVACHOL Take 40 mg by mouth nightly. torsemide 10 mg tablet Commonly known as:  DEMADEX Take 10 mg by mouth daily. VITAMIN D3 2,000 unit Tab Generic drug:  cholecalciferol (vitamin D3) Take 2,000 Units by mouth daily. * warfarin 2.5 mg tablet Commonly known as:  COUMADIN Take 2.5 mg by mouth every Tuesday, Thursday, Saturday & Sunday. 2.5 mg TuThSaSu and 1.25 mg MWF * warfarin 2.5 mg tablet Commonly known as:  COUMADIN Take 1.25 mg by mouth every Monday, Wednesday, Friday. 2.5 mg TuThSaSu and 1.25 mg MWF * Notice: This list has 2 medication(s) that are the same as other medications prescribed for you. Read the directions carefully, and ask your doctor or other care provider to review them with you. Follow-up Instructions Return in about 1 month (around 1/4/2018). Patient Instructions Transient Ischemic Attack: Care Instructions Your Care Instructions A transient ischemic attack (TIA) is when blood flow to a part of your brain is blocked for a short time. A TIA is like a stroke but usually lasts only a few minutes. A TIA does not cause lasting brain damage. Any vision problems, slurred speech, or other symptoms usually go away in 10 to 20 minutes. But they may last for up to 24 hours. TIAs are often warning signs of a stroke. Some people who have a TIA may have a stroke in the future. A stroke can cause symptoms like those of a TIA. But a stroke causes lasting damage to your brain. You can take steps to help prevent a stroke.  One thing you can do is get early treatment. If you have other new symptoms, or if your symptoms do not get better, go back to the emergency room or call your doctor right away. Getting treatment right away may prevent long-term brain damage caused by a stroke. The doctor has checked you carefully, but problems can develop later. If you notice any problems or new symptoms, get medical treatment right away. Follow-up care is a key part of your treatment and safety. Be sure to make and go to all appointments, and call your doctor if you are having problems. It's also a good idea to know your test results and keep a list of the medicines you take. How can you care for yourself at home? Medicines ? · Be safe with medicines. Take your medicines exactly as prescribed. Call your doctor if you think you are having a problem with your medicine. ? · If you take a blood thinner, such as aspirin, be sure you get instructions about how to take your medicine safely. Blood thinners can cause serious bleeding problems. ? · Call your doctor if you are not able to take your medicines for any reason. ? · Do not take any over-the-counter medicines or herbal products without talking to your doctor first.  
? · If you take birth control pills or hormone therapy, talk to your doctor. Ask if these treatments are right for you. ? Lifestyle changes ? · Do not smoke. If you need help quitting, talk to your doctor about stop-smoking programs and medicines. ? · Be active. If your doctor recommends it, get more exercise. Walking is a good choice. Bit by bit, increase the amount you walk every day. Try for at least 30 minutes on most days of the week. You also may want to swim, bike, or do other activities. ? · Eat heart-healthy foods. These include fruits, vegetables, high-fiber foods, fish, and foods that are low in sodium, saturated fat, and trans fat. ? · Stay at a healthy weight. Lose weight if you need to. ? · Limit alcohol to 2 drinks a day for men and 1 drink a day for women. ?Staying healthy ? · Manage other health problems such as diabetes, high blood pressure, and high cholesterol. ? · Get the flu vaccine every year. When should you call for help? Call 911 anytime you think you may need emergency care. For example, call if: 
? · You have new or worse symptoms of a stroke. These may include: 
¨ Sudden numbness, tingling, weakness, or loss of movement in your face, arm, or leg, especially on only one side of your body. ¨ Sudden vision changes. ¨ Sudden trouble speaking. ¨ Sudden confusion or trouble understanding simple statements. ¨ Sudden problems with walking or balance. ¨ A sudden, severe headache that is different from past headaches. Call 911 even if these symptoms go away in a few minutes. ? · You feel like you are having another TIA. ? Watch closely for changes in your health, and be sure to contact your doctor if you have any problems. Where can you learn more? Go to http://linden-carlton.info/. Enter [phone] in the search box to learn more about \"Transient Ischemic Attack: Care Instructions. \" Current as of: March 20, 2017 Content Version: 11.4 © 2017-3377 [employer]. Care instructions adapted under license by [employer] (which disclaims liability or warranty for this information). If you have questions about a medical condition or this instruction, always ask your healthcare professional. [place] [address] any warranty or liability for your use of this information. Introducing [facility]! [facility] introduces [employer] patient portal. Now you can access parts of your medical record, email your doctor's office, and request medication refills online. 1. In your internet browser, go to [email or web address] 2. Click on the First Time User? Click Here link in the Sign In box.  You will see the New Member Sign Up page. 3. Enter your [employer] Access Code exactly as it appears below. You will not need to use this code after youve completed the sign-up process. If you do not sign up before the expiration date, you must request a new code. · [employer] Access Code: [email or web address] Expires: 2/27/2018 11:29 AM 
 
4. Enter the last four digits of your Social Security Number (xxxx) and Date of Birth (mm/dd/yyyy) as indicated and click Submit. You will be taken to the next sign-up page. 5. Create a [employer] ID. This will be your [employer] login ID and cannot be changed, so think of one that is secure and easy to remember. 6. Create a [employer] password. You can change your password at any time. 7. Enter your Password Reset Question and Answer. This can be used at a later time if you forget your password. 8. Enter your e-mail address. You will receive e-mail notification when new information is available in [address]. 9. Click Sign Up. You can now view and download portions of your medical record. 10. Click the Download Summary menu link to download a portable copy of your medical information. If you have questions, please visit the Frequently Asked Questions section of the [employer] website. Remember, [employer] is NOT to be used for urgent needs. For medical emergencies, dial 911. Now available from your iPhone and Android! Please provide this summary of care documentation to your next provider. Your primary care clinician is listed as Phys Other. If you have any questions after today's visit, please call [phone]. Patient wears glasses for reading, not worn during eval. Patient denies diplopia or blurry vision, able to correctly read clock ~10ft away.

## 2019-12-12 ENCOUNTER — HOSPITAL ENCOUNTER (OUTPATIENT)
Dept: PREADMISSION TESTING | Age: 74
Discharge: HOME | End: 2019-12-12
Payer: MEDICARE

## 2019-12-12 VITALS
SYSTOLIC BLOOD PRESSURE: 157 MMHG | RESPIRATION RATE: 18 BRPM | TEMPERATURE: 97.6 F | DIASTOLIC BLOOD PRESSURE: 82 MMHG | HEART RATE: 94 BPM | OXYGEN SATURATION: 97 % | BODY MASS INDEX: 35.62 KG/M2 | HEIGHT: 72 IN | WEIGHT: 263 LBS

## 2019-12-12 LAB
ANION GAP SERPL CALC-SCNC: 7 MMOL/L (ref 5–15)
BUN SERPL-MCNC: 17 MG/DL (ref 6–20)
BUN/CREAT SERPL: 17 (ref 12–20)
CALCIUM SERPL-MCNC: 9.6 MG/DL (ref 8.5–10.1)
CHLORIDE SERPL-SCNC: 108 MMOL/L (ref 97–108)
CO2 SERPL-SCNC: 27 MMOL/L (ref 21–32)
CREAT SERPL-MCNC: 0.98 MG/DL (ref 0.7–1.3)
GLUCOSE SERPL-MCNC: 77 MG/DL (ref 65–100)
POTASSIUM SERPL-SCNC: 4.6 MMOL/L (ref 3.5–5.1)
SODIUM SERPL-SCNC: 142 MMOL/L (ref 136–145)

## 2019-12-12 PROCEDURE — 80048 BASIC METABOLIC PNL TOTAL CA: CPT

## 2019-12-12 PROCEDURE — 36415 COLL VENOUS BLD VENIPUNCTURE: CPT

## 2019-12-12 RX ORDER — WARFARIN 2.5 MG/1
2.5 TABLET ORAL
Status: ON HOLD | COMMUNITY
End: 2020-02-10 | Stop reason: SDUPTHER

## 2019-12-12 RX ORDER — ASPIRIN 81 MG/1
81 TABLET ORAL
COMMUNITY

## 2019-12-12 RX ORDER — LEVOTHYROXINE SODIUM 25 UG/1
25 TABLET ORAL
COMMUNITY

## 2019-12-12 RX ORDER — SPIRONOLACTONE 25 MG/1
25 TABLET ORAL 2 TIMES DAILY
COMMUNITY
End: 2020-02-10

## 2019-12-12 NOTE — PERIOP NOTES
[address] [address] [facility]   MAIN OR                                  [phone]   MAIN PRE OP                          [phone]                                                                                AMBULATORY PRE OP          [phone]  PRE-ADMISSION TESTING    [phone]     Surgery Date:   Thursday, December 19, 2019        Is surgery arrival time given by surgeon? NO  If NO, [address] staff will call you between 3 and 7pm the day before your surgery with your arrival time. (If your surgery is on a Monday, we will call you the Friday before.)    Call [phone] after 7pm Monday-Friday if you did not receive this call. INSTRUCTIONS BEFORE YOUR SURGERY   When You  Arrive Arrive at the 2nd [address] [facility] on the day of your surgery  Have your insurance card, photo ID, and any copayment (if needed)   Food   and   Drink NO food or drink after midnight the night before surgery    This means NO water, gum, mints, coffee, juice, etc.  No alcohol (beer, wine, liquor) 24 hours before and after surgery   Medications to   TAKE   Morning of Surgery MEDICATIONS TO TAKE THE MORNING OF SURGERY WITH A SIP OF WATER:    levothyroxine   Medications  To  STOP      7 days before surgery  Non-Steroidal anti-inflammatory Drugs (NSAID's): for example, Ibuprofen (Advil, Motrin), Naproxen (Aleve)   Aspirin, if taking for pain    Herbal supplements, vitamins, and fish oil    (Pain medications not listed above, including Tylenol may be taken)   Blood  Thinners  If you take  Aspirin, Plavix, Coumadin, or any blood-thinning or anti-blood clot medicine, talk to the doctor who prescribed the medications for pre-operative instructions.  Follow Dr. Sang Covington instructions for Warfarin pre op.    Bathing Clothing  Jewelry  Valuables      If you shower the morning of surgery, please do not apply anything to your skin (lotions, powders, deodorant, or makeup, especially mascara)   Follow Chlorhexidine Care Fusion body wash instructions provided to you during PAT appointment. Begin 3 days prior to surgery.  Do not shave or trim anywhere 24 hours before surgery   Wear your hair loose or down; no pony-tails, buns, or metal hair clips   Wear loose, comfortable, clean clothes   Wear glasses instead of contacts   Leave money, valuables, and jewelry, including body piercings, at home   Going Home - or Spending the Night  SAME-DAY SURGERY: You must have a responsible adult drive you home and stay with you 24 hours after surgery   ADMITS: If your doctor is keeping you in the hospital after surgery, leave personal belongings/luggage in your car until you have a hospital room number. Hospital discharge time is 12 noon  Drivers must be here before 12 noon unless you are told differently   Special Instructions Free  parking available from 7am until [address] at home INR Monday, December 16 and call [phone] with results per NAMITA Marx NP     Follow all instructions so your surgery wont be cancelled. Please, be on time. If a situation occurs and you are delayed the day of surgery, call [phone]. If your physical condition changes (like a fever, cold, flu, etc.) call your surgeon. Home medication(s) reviewed and verified with patient bottles during PAT appointment. The patient was contacted  in person. The patient verbalizes understanding of all instructions and does not  need reinforcement.

## 2019-12-12 NOTE — PERIOP NOTES
Patient states that he does his own INR check at home biweekly. Patient instructed by NAMITA Mota NP to check levels on 12/16/2019 and call [phone] with results. Patient verbalized an understanding of instructions.

## 2019-12-13 NOTE — PERIOP NOTES
Rec'd ASA plan from Yu. ICD forwarded to pacer team by [facility] nurse. Spoke w/pt @ this time to inform him to stop his ASA today & let him know Armen Peter would indicate when to restart (typically 1 day post-op). Pt verbalized understanding.

## 2019-12-17 NOTE — PERIOP NOTES
Received message from patient that 12/16/19 INR on home device was 1.8, NAMITA Kim NP notified. Message left at Dr. Kody Nguyen office to request ICD plan (phone number for [address] was the scheduling number, unable to speak with anyone to verify receipt of plan). ICD plan received and placed on chart.

## 2020-01-02 ENCOUNTER — HOSPITAL ENCOUNTER (OUTPATIENT)
Dept: PREADMISSION TESTING | Age: 75
Discharge: HOME | End: 2020-01-02
Payer: MEDICARE

## 2020-01-02 VITALS
BODY MASS INDEX: 27.01 KG/M2 | RESPIRATION RATE: 18 BRPM | WEIGHT: 199.38 LBS | DIASTOLIC BLOOD PRESSURE: 78 MMHG | OXYGEN SATURATION: 100 % | HEIGHT: 72 IN | TEMPERATURE: 97.8 F | HEART RATE: 95 BPM | SYSTOLIC BLOOD PRESSURE: 126 MMHG

## 2020-01-02 LAB
ANION GAP SERPL CALC-SCNC: 7 MMOL/L (ref 5–15)
APTT PPP: 47.6 SEC (ref 22.1–32)
BUN SERPL-MCNC: 29 MG/DL (ref 6–20)
BUN/CREAT SERPL: 25 (ref 12–20)
CALCIUM SERPL-MCNC: 9.4 MG/DL (ref 8.5–10.1)
CHLORIDE SERPL-SCNC: 104 MMOL/L (ref 97–108)
CO2 SERPL-SCNC: 25 MMOL/L (ref 21–32)
CREAT SERPL-MCNC: 1.14 MG/DL (ref 0.7–1.3)
GLUCOSE SERPL-MCNC: 105 MG/DL (ref 65–100)
INR PPP: 6.5 (ref 0.9–1.1)
POTASSIUM SERPL-SCNC: 5 MMOL/L (ref 3.5–5.1)
PROTHROMBIN TIME: 59.4 SEC (ref 9–11.1)
SODIUM SERPL-SCNC: 136 MMOL/L (ref 136–145)
THERAPEUTIC RANGE,PTTT: ABNORMAL SECS (ref 58–77)

## 2020-01-02 PROCEDURE — 85730 THROMBOPLASTIN TIME PARTIAL: CPT

## 2020-01-02 PROCEDURE — 36415 COLL VENOUS BLD VENIPUNCTURE: CPT

## 2020-01-02 PROCEDURE — 80048 BASIC METABOLIC PNL TOTAL CA: CPT

## 2020-01-02 PROCEDURE — 85610 PROTHROMBIN TIME: CPT

## 2020-01-02 RX ORDER — AMOXICILLIN AND CLAVULANATE POTASSIUM 875; 125 MG/1; MG/1
1 TABLET, FILM COATED ORAL 2 TIMES DAILY
COMMUNITY
End: 2020-07-19

## 2020-01-02 NOTE — PERIOP NOTES
Received a call from Harriett Sahu in the lab for a critical value, INR 6.5. Theodoro Koyanagi NP notified and she will contact patient.

## 2020-01-02 NOTE — PERIOP NOTES
[address] [address] [facility]   MAIN OR                                  [phone]   MAIN PRE OP                          [phone]                                                                                AMBULATORY PRE OP          [phone]  PRE-ADMISSION TESTING    [phone]     Surgery Date:   Friday, January 10, 2020        Is surgery arrival time given by surgeon? NO  If NO, [facility] staff will call you between 3 and 7pm the day before your surgery with your arrival time. (If your surgery is on a Monday, we will call you the Friday before.)    Call [phone] after 7pm Monday-Friday if you did not receive this call. INSTRUCTIONS BEFORE YOUR SURGERY   When You  Arrive Arrive at the 2nd [address] [facility] on the day of your surgery  Have your insurance card, photo ID, and any copayment (if needed)   Food   and   Drink NO food or drink after midnight the night before surgery    This means NO water, gum, mints, coffee, juice, etc.  No alcohol (beer, wine, liquor) 24 hours before and after surgery   Medications to   TAKE   Morning of Surgery MEDICATIONS TO TAKE THE MORNING OF SURGERY WITH A SIP OF WATER:    levothyroxine   Medications  To  STOP      7 days before surgery  Non-Steroidal anti-inflammatory Drugs (NSAID's): for example, Ibuprofen (Advil, Motrin), Naproxen (Aleve)   Aspirin, if taking for pain    Herbal supplements, vitamins, and fish oil  (Pain medications not listed above, including Tylenol may be taken)   Blood  Thinners  If you take  Aspirin, Plavix, Coumadin, or any blood-thinning or anti-blood clot medicine, talk to the doctor who prescribed the medications for pre-operative instructions.  Stop aspirin 7 days prior to surgery per Dr. Susana Jarrett.  Stop coumadin 5 days prior to surgery per .    Bathing Clothing  Jewelry  Valuables      If you shower the morning of surgery, please do not apply anything to your skin (lotions, powders, deodorant, or makeup, especially mascara)   Follow Chlorhexidine Care Fusion body wash instructions provided to you during PAT appointment. Begin 3 days prior to surgery.  Do not shave or trim anywhere 24 hours before surgery   Wear your hair loose or down; no pony-tails, buns, or metal hair clips   Wear loose, comfortable, clean clothes   Wear glasses instead of contacts   Leave money, valuables, and jewelry, including body piercings, at home   Going Home - or Spending the Night  SAME-DAY SURGERY: You must have a responsible adult drive you home and stay with you 24 hours after surgery   ADMITS: If your doctor is keeping you in the hospital after surgery, leave personal belongings/luggage in your car until you have a hospital room number. Hospital discharge time is 12 noon  Drivers must be here before 12 noon unless you are told differently   Special Instructions Free  parking available from 7am until 5pm.     Follow all instructions so your surgery wont be cancelled. Please, be on time. If a situation occurs and you are delayed the day of surgery, call [phone]. If your physical condition changes (like a fever, cold, flu, etc.) call your surgeon. Home medication(s) reviewed and verified verbally and with list during PAT appointment. The patient was contacted  in person. The patient verbalizes understanding of all instructions and does not  need reinforcement.

## 2020-01-03 NOTE — H&P
Preoperative Evaluation                     History and Physical with Surgical Risk Stratification     1/2/2020    CC: Bilateral inguinal hernias  Surgery: Hernia Repair     HPI:   Delmis Kellogg is a 69 y.o. male referred for pre-operative evaluation by Dr. Kamille Almanzar for surgery on 1/10/20. Mr. Ary Cerda was here in pre-admission testing in December for this surgery but had to postpone r/t a sinus infection. He states he has no lingering symptoms and feels great now. He notes he has had hernias for many years. Denies pain. Denies bowel or bladder complications. Mr Ary Cerda takes coumadin for an aortic valve replacement. He checks his INR at home. He will be coming off of his Coumadin 5 days prior to surgery and ASA 7 days then doing Lovenox bridging. His cardiologist Dr. Dayton Mortimer has given clearance and can be found under media. The patient was evaluated in the surgeon's office and it was determined that the most appropriate plan of care is to proceed with surgical intervention. Patient's PCP Sandra Pagan MD    Review of Systems     Constitutional: Negative for chills and fever  HENT: Negative for congestion and sore throat  Eyes: negative for blurred vision and double vision  Respiratory: Negative for cough, shortness of breath and wheezing  Mouth: Negative for loose, broken or chipped teeth. Cardiovascular: Negative for chest pain and palpitations  Gastrointestinal: Negative for abdominal pain, constipation, diarrhea and nausea  Genitourinary: Negative for dysuria and hematuria  Musculoskeletal: Negative for joint pain  Skin: Negative for rash, open wounds. Negative for bruises easily  Neurological: Negative for dizziness, tremors and headaches  Psychiatric: Negative for depression. The patient is not nervous/anxious.     Inherent Risk of Surgery     Surgical risk:  Intermediate  Low:  EIntermediate:    abdominalHigh:    Patient Cardiac Risk Assessment     Revised Cardiac Risk Index (RCRI)  Hx of Heart Failure  Rate if cardiac death, nonfatal MI, nonfatal cardiac arrest by number of risk factor- 1.0%    GINO/AHA 2007 Guidelines:   1) Surgery Emergency, Non-cardiac -> to surgery  2) If not, look at clinical predictors    Major Intermediate Minor   Abnormal EKGCompensated/Prior CHF HX of TIA   Blood Thinner: Coumadin and ASA    METS      EQUAL TO 4 Care for self Walk indoors around house Walk 2-3 blocks on level ground (2-3 mph) Light work around house (dust, dishes)     Other Risk Factors:   Screening for ETOH use:  Done and low risk  Smoking status:   None    Personal or FH of bleeding problems:  No  Personal or FH of blood clots:  No  Personal or FH of anesthesia problems:   No    Pulmonary Risk:  Asthma or COPD:  No  Body mass index is 27.04 kg/m². Known NITA:  No  BUN normal    Past Medical, Surgical, Social History     Allergies: No Known Allergies    Reviewed medications placed in CC by nurse.      Past Medical History:   Diagnosis Date    Anticoagulated on Coumadin 11/27/2017    Arthritis     Atrial fibrillation (HCC)     PAF    Cardiomyopathy ([address]     GI bleed 02/2017    HTN (hypertension)     Hypothyroidism     [facility] in situ [facility]     Orthostatic hypotension 11/29/2017    Pacemaker 11/28/2017    Medtronic BiV ICD    S/P AVR (aortic valve replacement)     mechanical AVR    Syncope 1407    Systolic heart failure ([address]     TIA (transient ischemic attack) 2017     Past Surgical History:   Procedure Laterality Date    HX AORTIC VALVE REPLACEMENT  2003    mechanical aortic valve     HX KNEE ARTHROSCOPY Right 1980's    HX MOHS PROCEDURES Left 2005    Cheek and [place]    HX PACEMAKER  2011    with defibulator    HX TONSILLECTOMY  1952     Social History     Tobacco Use    Smoking status: Not on file    Smokeless tobacco: Never Used   Substance Use Topics    Alcohol use: Not Currently    Drug use: No     Family History   Problem Relation Age of Onset    Cancer Father Objective     Vitals:    01/02/20 1002   BP: 126/78   Pulse: 95   Resp: 18   Temp: 97.8 °F (36.6 °C)   SpO2: 100%   Weight: 90.4 kg (199 lb 6 oz)   Height: 6' (1.829 m)       Constitutional:  Appears well,  No Acute Distress, Vitals noted  Psychiatric:   Affect normal, Alert and Oriented to person/place/time    Eyes:   Pupils equally round and reactive, EOMI, conjunctiva clear, eyelids normal  ENT:   External ears and nose normal/lips, teeth normal, gums normal, TMs and Orophyarynx normal  Neck:   General inspection and Thyroid normal.  No abnormal cervical or supraclavicular nodes    Lungs:   Clear to auscultation, good respiratory effort  Heart: Ausculation normal.  Regular rhythm. No cardiac murmurs. No carotid bruits or palpable thrills  Chest wall normal  Musculoskeletal: Normal Gait  Extremities:   Without edema, good peripheral pulses  Skin:   Warm to palpation, without rashes, bruising, or suspicious lesions     Assessment and Plan     Assessment/Plan:   1) Bilateral Inguinal Hernia  2) Pre-Operative Evaluation    EKG reviewed from 10/21/19  BMP Reviewed    PT/INR elevated to 6.5 today. Called patient and notified him. He is a long time coumadin user and is very comfortable dosing when he is high. I have placed a day of surgery order in to check. Preoperative Clearance  Per RCRI, the patient has a 1.0% risk of cardiac death, nonfatal MI, nonfatal cardiac arrest based on one risk factors. Per ACC/AHA guidelines, patient is intermediate risk for a(n) intermediate risk surgery and may proceed to planned surgery with the above noted risk.     Isela Min NP

## 2020-01-28 ENCOUNTER — ANESTHESIA EVENT (OUTPATIENT)
Dept: SURGERY | Age: 75
End: 2020-01-28
Payer: MEDICARE

## 2020-01-29 RX ORDER — ENOXAPARIN SODIUM 100 MG/ML
INJECTION SUBCUTANEOUS EVERY 12 HOURS
COMMUNITY
End: 2020-07-19

## 2020-01-29 NOTE — PROGRESS NOTES
Called pt this morning around 1100 to insure that he is following coumadin/asa plans as previously mapped out by his cardiologist. Updated [facility] @ that time based on his last date/time for both coumadin & ASA & added his lovenox bridge, which he completed this morning.

## 2020-01-29 NOTE — PROGRESS NOTES
1/10/20 surgery rescheduled for tomorrow. [facility].

## 2020-01-30 ENCOUNTER — HOSPITAL ENCOUNTER (OUTPATIENT)
Age: 75
Setting detail: OUTPATIENT SURGERY
Discharge: HOME | End: 2020-01-30
Attending: SURGERY | Admitting: SURGERY
Payer: MEDICARE

## 2020-01-30 ENCOUNTER — ANESTHESIA (OUTPATIENT)
Dept: SURGERY | Age: 75
End: 2020-01-30
Payer: MEDICARE

## 2020-01-30 VITALS
OXYGEN SATURATION: 97 % | WEIGHT: 191 LBS | RESPIRATION RATE: 19 BRPM | HEIGHT: 72 IN | BODY MASS INDEX: 25.87 KG/M2 | SYSTOLIC BLOOD PRESSURE: 111 MMHG | DIASTOLIC BLOOD PRESSURE: 63 MMHG | HEART RATE: 75 BPM | TEMPERATURE: 98 F

## 2020-01-30 DIAGNOSIS — K40.20 NON-RECURRENT BILATERAL INGUINAL HERNIA WITHOUT OBSTRUCTION OR GANGRENE: Primary | ICD-10-CM

## 2020-01-30 LAB
INR PPP: 1.2 (ref 0.9–1.1)
PROTHROMBIN TIME: 12.1 SEC (ref 9–11.1)

## 2020-01-30 PROCEDURE — 77030011640 HC PAD GRND REM COVD -A: Performed by: SURGERY

## 2020-01-30 PROCEDURE — 77030028402 HC SYS LAPSC TISS RETRV AMR -B: Performed by: SURGERY

## 2020-01-30 PROCEDURE — 77030031139 HC SUT VCRL2 J&J -A: Performed by: SURGERY

## 2020-01-30 PROCEDURE — 85610 PROTHROMBIN TIME: CPT

## 2020-01-30 PROCEDURE — C9290 INJ, BUPIVACAINE LIPOSOME: HCPCS | Performed by: SURGERY

## 2020-01-30 PROCEDURE — 77030037134 HC WRAP COMPR BACK THER SOLM -B

## 2020-01-30 PROCEDURE — 77030012770 HC TRCR OPT FX AMR -B: Performed by: SURGERY

## 2020-01-30 PROCEDURE — 77030016151 HC PROTCTR LNS DFOG COVD -B: Performed by: SURGERY

## 2020-01-30 PROCEDURE — 74011000250 HC RX REV CODE- 250: Performed by: SURGERY

## 2020-01-30 PROCEDURE — 74011250636 HC RX REV CODE- 250/636: Performed by: ANESTHESIOLOGY

## 2020-01-30 PROCEDURE — 77030009848 HC PASSR SUT SET COOP -C: Performed by: SURGERY

## 2020-01-30 PROCEDURE — 76010000878 HC OR TIME 3 TO 3.5HR INTENSV - TIER 2: Performed by: SURGERY

## 2020-01-30 PROCEDURE — 77030033188 HC TBNG FLTRD BIIFUR DISP CNMD -C: Performed by: SURGERY

## 2020-01-30 PROCEDURE — 77030003028 HC SUT VCRL J&J -A: Performed by: SURGERY

## 2020-01-30 PROCEDURE — 74011250636 HC RX REV CODE- 250/636: Performed by: NURSE ANESTHETIST, CERTIFIED REGISTERED

## 2020-01-30 PROCEDURE — 77030018836 HC SOL IRR NACL ICUM -A: Performed by: SURGERY

## 2020-01-30 PROCEDURE — 77030002966 HC SUT PDS J&J -A: Performed by: SURGERY

## 2020-01-30 PROCEDURE — 77030035236 HC SUT PDS STRATFX BARB J&J -B: Performed by: SURGERY

## 2020-01-30 PROCEDURE — 77030003578 HC NDL INSUF VERES AMR -B: Performed by: SURGERY

## 2020-01-30 PROCEDURE — 77030019908 HC STETH ESOPH SIMS -A: Performed by: ANESTHESIOLOGY

## 2020-01-30 PROCEDURE — 74011250636 HC RX REV CODE- 250/636: Performed by: SURGERY

## 2020-01-30 PROCEDURE — C1781 MESH (IMPLANTABLE): HCPCS | Performed by: SURGERY

## 2020-01-30 PROCEDURE — 77030002933 HC SUT MCRYL J&J -A: Performed by: SURGERY

## 2020-01-30 PROCEDURE — 77030036554: Performed by: SURGERY

## 2020-01-30 PROCEDURE — 77030026438 HC STYL ET INTUB CARD -A: Performed by: ANESTHESIOLOGY

## 2020-01-30 PROCEDURE — 77030005513 HC CATH URETH FOL11 MDII -B: Performed by: SURGERY

## 2020-01-30 PROCEDURE — 77030002895 HC DEV VASC CLOSR COVD -B: Performed by: SURGERY

## 2020-01-30 PROCEDURE — 74011000250 HC RX REV CODE- 250: Performed by: NURSE ANESTHETIST, CERTIFIED REGISTERED

## 2020-01-30 PROCEDURE — 77030003666 HC NDL SPINAL BD -A: Performed by: SURGERY

## 2020-01-30 PROCEDURE — 76210000006 HC OR PH I REC 0.5 TO 1 HR: Performed by: SURGERY

## 2020-01-30 PROCEDURE — 77030002912 HC SUT ETHBND J&J -A: Performed by: SURGERY

## 2020-01-30 PROCEDURE — 77030040922 HC BLNKT HYPOTHRM STRY -A

## 2020-01-30 PROCEDURE — 76060000037 HC ANESTHESIA 3 TO 3.5 HR: Performed by: SURGERY

## 2020-01-30 PROCEDURE — 77030022704 HC SUT VLOC COVD -B: Performed by: SURGERY

## 2020-01-30 PROCEDURE — 74011250637 HC RX REV CODE- 250/637: Performed by: SURGERY

## 2020-01-30 PROCEDURE — 74011000258 HC RX REV CODE- 258: Performed by: SURGERY

## 2020-01-30 PROCEDURE — 77030040361 HC SLV COMPR DVT MDII -B

## 2020-01-30 PROCEDURE — 77030035277 HC OBTRTR BLDELSS DISP INTU -B: Performed by: SURGERY

## 2020-01-30 PROCEDURE — 77030013079 HC BLNKT BAIR HGGR 3M -A: Performed by: ANESTHESIOLOGY

## 2020-01-30 PROCEDURE — 77030040830 HC CATH URETH FOL MDII -A: Performed by: SURGERY

## 2020-01-30 PROCEDURE — 36415 COLL VENOUS BLD VENIPUNCTURE: CPT

## 2020-01-30 PROCEDURE — 76210000026 HC REC RM PH II 1 TO 1.5 HR: Performed by: SURGERY

## 2020-01-30 PROCEDURE — 77030020703 HC SEAL CANN DISP INTU -B: Performed by: SURGERY

## 2020-01-30 PROCEDURE — 77030008684 HC TU ET CUF COVD -B: Performed by: ANESTHESIOLOGY

## 2020-01-30 PROCEDURE — 77030018673: Performed by: SURGERY

## 2020-01-30 DEVICE — 3DMAX MESH, 10.8 CM X 16.0 CM (4.3" X 6.3"), RIGHT, LARGE
Type: IMPLANTABLE DEVICE | Site: INGUINAL | Status: FUNCTIONAL
Brand: 3DMAX

## 2020-01-30 DEVICE — VENTRALIGHT ST MESH, 4.5" (11.4 CM), CIRCLE
Type: IMPLANTABLE DEVICE | Site: ABDOMEN | Status: FUNCTIONAL
Brand: VENTRALIGHT

## 2020-01-30 DEVICE — 3DMAX MESH, 10.8 CM X 16.0 CM (4.3" X 6.3"), LEFT, LARGE
Type: IMPLANTABLE DEVICE | Site: INGUINAL | Status: FUNCTIONAL
Brand: 3DMAX

## 2020-01-30 RX ORDER — FENTANYL CITRATE 50 UG/ML
25 INJECTION, SOLUTION INTRAMUSCULAR; INTRAVENOUS
Status: DISCONTINUED | OUTPATIENT
Start: 2020-01-30 | End: 2020-01-30 | Stop reason: HOSPADM

## 2020-01-30 RX ORDER — SODIUM CHLORIDE 0.9 % (FLUSH) 0.9 %
5-40 SYRINGE (ML) INJECTION EVERY 8 HOURS
Status: DISCONTINUED | OUTPATIENT
Start: 2020-01-30 | End: 2020-01-30 | Stop reason: HOSPADM

## 2020-01-30 RX ORDER — POLYETHYLENE GLYCOL 3350 17 G/17G
17 POWDER, FOR SOLUTION ORAL DAILY
Qty: 238 G | Refills: 0 | Status: SHIPPED | OUTPATIENT
Start: 2020-01-30 | End: 2020-07-19

## 2020-01-30 RX ORDER — SODIUM CHLORIDE 0.9 % (FLUSH) 0.9 %
5-40 SYRINGE (ML) INJECTION AS NEEDED
Status: DISCONTINUED | OUTPATIENT
Start: 2020-01-30 | End: 2020-01-30 | Stop reason: HOSPADM

## 2020-01-30 RX ORDER — ONDANSETRON 2 MG/ML
4 INJECTION INTRAMUSCULAR; INTRAVENOUS AS NEEDED
Status: DISCONTINUED | OUTPATIENT
Start: 2020-01-30 | End: 2020-01-30 | Stop reason: HOSPADM

## 2020-01-30 RX ORDER — NEOSTIGMINE METHYLSULFATE 1 MG/ML
INJECTION INTRAVENOUS AS NEEDED
Status: DISCONTINUED | OUTPATIENT
Start: 2020-01-30 | End: 2020-01-30 | Stop reason: HOSPADM

## 2020-01-30 RX ORDER — SODIUM CHLORIDE, SODIUM LACTATE, POTASSIUM CHLORIDE, CALCIUM CHLORIDE 600; 310; 30; 20 MG/100ML; MG/100ML; MG/100ML; MG/100ML
125 INJECTION, SOLUTION INTRAVENOUS CONTINUOUS
Status: DISCONTINUED | OUTPATIENT
Start: 2020-01-30 | End: 2020-01-30 | Stop reason: HOSPADM

## 2020-01-30 RX ORDER — SUCCINYLCHOLINE CHLORIDE 20 MG/ML
INJECTION INTRAMUSCULAR; INTRAVENOUS AS NEEDED
Status: DISCONTINUED | OUTPATIENT
Start: 2020-01-30 | End: 2020-01-30 | Stop reason: HOSPADM

## 2020-01-30 RX ORDER — DIPHENHYDRAMINE HYDROCHLORIDE 50 MG/ML
12.5 INJECTION, SOLUTION INTRAMUSCULAR; INTRAVENOUS AS NEEDED
Status: DISCONTINUED | OUTPATIENT
Start: 2020-01-30 | End: 2020-01-30 | Stop reason: HOSPADM

## 2020-01-30 RX ORDER — HYDROMORPHONE HYDROCHLORIDE 2 MG/1
1 TABLET ORAL
Qty: 8 TAB | Refills: 0 | OUTPATIENT
Start: 2020-01-30 | End: 2020-01-30 | Stop reason: SDUPTHER

## 2020-01-30 RX ORDER — PROPOFOL 10 MG/ML
INJECTION, EMULSION INTRAVENOUS AS NEEDED
Status: DISCONTINUED | OUTPATIENT
Start: 2020-01-30 | End: 2020-01-30 | Stop reason: HOSPADM

## 2020-01-30 RX ORDER — MIDAZOLAM HYDROCHLORIDE 1 MG/ML
INJECTION, SOLUTION INTRAMUSCULAR; INTRAVENOUS AS NEEDED
Status: DISCONTINUED | OUTPATIENT
Start: 2020-01-30 | End: 2020-01-30 | Stop reason: HOSPADM

## 2020-01-30 RX ORDER — SODIUM CHLORIDE, SODIUM LACTATE, POTASSIUM CHLORIDE, CALCIUM CHLORIDE 600; 310; 30; 20 MG/100ML; MG/100ML; MG/100ML; MG/100ML
INJECTION, SOLUTION INTRAVENOUS
Status: DISCONTINUED | OUTPATIENT
Start: 2020-01-30 | End: 2020-01-30 | Stop reason: HOSPADM

## 2020-01-30 RX ORDER — FENTANYL CITRATE 50 UG/ML
INJECTION, SOLUTION INTRAMUSCULAR; INTRAVENOUS AS NEEDED
Status: DISCONTINUED | OUTPATIENT
Start: 2020-01-30 | End: 2020-01-30 | Stop reason: HOSPADM

## 2020-01-30 RX ORDER — ONDANSETRON 2 MG/ML
INJECTION INTRAMUSCULAR; INTRAVENOUS AS NEEDED
Status: DISCONTINUED | OUTPATIENT
Start: 2020-01-30 | End: 2020-01-30 | Stop reason: HOSPADM

## 2020-01-30 RX ORDER — LIDOCAINE HYDROCHLORIDE 20 MG/ML
INJECTION, SOLUTION EPIDURAL; INFILTRATION; INTRACAUDAL; PERINEURAL AS NEEDED
Status: DISCONTINUED | OUTPATIENT
Start: 2020-01-30 | End: 2020-01-30 | Stop reason: HOSPADM

## 2020-01-30 RX ORDER — NALOXONE HYDROCHLORIDE 0.4 MG/ML
0.04 INJECTION, SOLUTION INTRAMUSCULAR; INTRAVENOUS; SUBCUTANEOUS
Status: DISCONTINUED | OUTPATIENT
Start: 2020-01-30 | End: 2020-01-30 | Stop reason: HOSPADM

## 2020-01-30 RX ORDER — GLYCOPYRROLATE 0.2 MG/ML
INJECTION INTRAMUSCULAR; INTRAVENOUS AS NEEDED
Status: DISCONTINUED | OUTPATIENT
Start: 2020-01-30 | End: 2020-01-30 | Stop reason: HOSPADM

## 2020-01-30 RX ORDER — HYDROMORPHONE HYDROCHLORIDE 2 MG/1
1 TABLET ORAL
Qty: 8 TAB | Refills: 0 | Status: SHIPPED | OUTPATIENT
Start: 2020-01-30 | End: 2020-02-02

## 2020-01-30 RX ORDER — HYDROMORPHONE HYDROCHLORIDE 1 MG/ML
.25-1 INJECTION, SOLUTION INTRAMUSCULAR; INTRAVENOUS; SUBCUTANEOUS
Status: DISCONTINUED | OUTPATIENT
Start: 2020-01-30 | End: 2020-01-30 | Stop reason: HOSPADM

## 2020-01-30 RX ORDER — ACETAMINOPHEN 325 MG/1
1000 TABLET ORAL
Qty: 90 TAB | Refills: 1 | Status: SHIPPED | OUTPATIENT
Start: 2020-01-30 | End: 2020-07-19

## 2020-01-30 RX ORDER — HYDROMORPHONE HYDROCHLORIDE 2 MG/1
1 TABLET ORAL
Status: COMPLETED | OUTPATIENT
Start: 2020-01-30 | End: 2020-01-30

## 2020-01-30 RX ORDER — LIDOCAINE HYDROCHLORIDE 10 MG/ML
0.1 INJECTION, SOLUTION EPIDURAL; INFILTRATION; INTRACAUDAL; PERINEURAL AS NEEDED
Status: DISCONTINUED | OUTPATIENT
Start: 2020-01-30 | End: 2020-01-30 | Stop reason: HOSPADM

## 2020-01-30 RX ORDER — FLUMAZENIL 0.1 MG/ML
0.2 INJECTION INTRAVENOUS
Status: DISCONTINUED | OUTPATIENT
Start: 2020-01-30 | End: 2020-01-30 | Stop reason: HOSPADM

## 2020-01-30 RX ORDER — DEXAMETHASONE SODIUM PHOSPHATE 4 MG/ML
INJECTION, SOLUTION INTRA-ARTICULAR; INTRALESIONAL; INTRAMUSCULAR; INTRAVENOUS; SOFT TISSUE AS NEEDED
Status: DISCONTINUED | OUTPATIENT
Start: 2020-01-30 | End: 2020-01-30 | Stop reason: HOSPADM

## 2020-01-30 RX ORDER — POLYETHYLENE GLYCOL 3350 17 G/17G
17 POWDER, FOR SOLUTION ORAL DAILY
Qty: 238 G | Refills: 0 | OUTPATIENT
Start: 2020-01-30 | End: 2020-01-30 | Stop reason: SDUPTHER

## 2020-01-30 RX ORDER — ROCURONIUM BROMIDE 10 MG/ML
INJECTION, SOLUTION INTRAVENOUS AS NEEDED
Status: DISCONTINUED | OUTPATIENT
Start: 2020-01-30 | End: 2020-01-30 | Stop reason: HOSPADM

## 2020-01-30 RX ORDER — ALBUTEROL SULFATE 0.83 MG/ML
2.5 SOLUTION RESPIRATORY (INHALATION) AS NEEDED
Status: DISCONTINUED | OUTPATIENT
Start: 2020-01-30 | End: 2020-01-30 | Stop reason: HOSPADM

## 2020-01-30 RX ADMIN — FENTANYL CITRATE 50 MCG: 50 INJECTION, SOLUTION INTRAMUSCULAR; INTRAVENOUS at 10:36

## 2020-01-30 RX ADMIN — LIDOCAINE HYDROCHLORIDE 60 MG: 20 INJECTION, SOLUTION INTRAVENOUS at 10:37

## 2020-01-30 RX ADMIN — SUCCINYLCHOLINE CHLORIDE 100 MG: 20 INJECTION, SOLUTION INTRAMUSCULAR; INTRAVENOUS; PARENTERAL at 10:38

## 2020-01-30 RX ADMIN — SODIUM CHLORIDE 100 MCG: 9 INJECTION INTRAMUSCULAR; INTRAVENOUS; SUBCUTANEOUS at 10:44

## 2020-01-30 RX ADMIN — ROCURONIUM BROMIDE 10 MG: 50 INJECTION, SOLUTION INTRAVENOUS at 12:31

## 2020-01-30 RX ADMIN — FENTANYL CITRATE 50 MCG: 50 INJECTION, SOLUTION INTRAMUSCULAR; INTRAVENOUS at 11:18

## 2020-01-30 RX ADMIN — ROCURONIUM BROMIDE 10 MG: 50 INJECTION, SOLUTION INTRAVENOUS at 10:37

## 2020-01-30 RX ADMIN — DEXAMETHASONE SODIUM PHOSPHATE 4 MG: 4 INJECTION, SOLUTION INTRAMUSCULAR; INTRAVENOUS at 11:08

## 2020-01-30 RX ADMIN — ROCURONIUM BROMIDE 10 MG: 50 INJECTION, SOLUTION INTRAVENOUS at 12:49

## 2020-01-30 RX ADMIN — SODIUM CHLORIDE, POTASSIUM CHLORIDE, SODIUM LACTATE AND CALCIUM CHLORIDE: 600; 310; 30; 20 INJECTION, SOLUTION INTRAVENOUS at 10:28

## 2020-01-30 RX ADMIN — ROCURONIUM BROMIDE 10 MG: 50 INJECTION, SOLUTION INTRAVENOUS at 11:15

## 2020-01-30 RX ADMIN — SODIUM CHLORIDE, SODIUM LACTATE, POTASSIUM CHLORIDE, AND CALCIUM CHLORIDE 125 ML/HR: 600; 310; 30; 20 INJECTION, SOLUTION INTRAVENOUS at 09:58

## 2020-01-30 RX ADMIN — SODIUM CHLORIDE 100 MCG: 9 INJECTION INTRAMUSCULAR; INTRAVENOUS; SUBCUTANEOUS at 11:01

## 2020-01-30 RX ADMIN — FENTANYL CITRATE 25 MCG: 50 INJECTION, SOLUTION INTRAMUSCULAR; INTRAVENOUS at 14:05

## 2020-01-30 RX ADMIN — WATER 2 G: 1 INJECTION INTRAMUSCULAR; INTRAVENOUS; SUBCUTANEOUS at 10:46

## 2020-01-30 RX ADMIN — HYDROMORPHONE HYDROCHLORIDE 1 MG: 2 TABLET ORAL at 15:48

## 2020-01-30 RX ADMIN — SODIUM CHLORIDE, SODIUM LACTATE, POTASSIUM CHLORIDE, AND CALCIUM CHLORIDE: 600; 310; 30; 20 INJECTION, SOLUTION INTRAVENOUS at 12:08

## 2020-01-30 RX ADMIN — SODIUM CHLORIDE 100 MCG: 9 INJECTION INTRAMUSCULAR; INTRAVENOUS; SUBCUTANEOUS at 12:43

## 2020-01-30 RX ADMIN — ONDANSETRON 4 MG: 2 INJECTION INTRAMUSCULAR; INTRAVENOUS at 11:04

## 2020-01-30 RX ADMIN — SODIUM CHLORIDE 100 MCG: 9 INJECTION INTRAMUSCULAR; INTRAVENOUS; SUBCUTANEOUS at 11:10

## 2020-01-30 RX ADMIN — SODIUM CHLORIDE 100 MCG: 9 INJECTION INTRAMUSCULAR; INTRAVENOUS; SUBCUTANEOUS at 11:15

## 2020-01-30 RX ADMIN — NEOSTIGMINE METHYLSULFATE 3 MG: 1 INJECTION, SOLUTION INTRAVENOUS at 13:26

## 2020-01-30 RX ADMIN — SODIUM CHLORIDE 100 MCG: 9 INJECTION INTRAMUSCULAR; INTRAVENOUS; SUBCUTANEOUS at 11:57

## 2020-01-30 RX ADMIN — ROCURONIUM BROMIDE 20 MG: 50 INJECTION, SOLUTION INTRAVENOUS at 11:00

## 2020-01-30 RX ADMIN — ROCURONIUM BROMIDE 10 MG: 50 INJECTION, SOLUTION INTRAVENOUS at 12:08

## 2020-01-30 RX ADMIN — SODIUM CHLORIDE 100 MCG: 9 INJECTION INTRAMUSCULAR; INTRAVENOUS; SUBCUTANEOUS at 12:57

## 2020-01-30 RX ADMIN — SODIUM CHLORIDE 100 MCG: 9 INJECTION INTRAMUSCULAR; INTRAVENOUS; SUBCUTANEOUS at 11:34

## 2020-01-30 RX ADMIN — SODIUM CHLORIDE 100 MCG: 9 INJECTION INTRAMUSCULAR; INTRAVENOUS; SUBCUTANEOUS at 12:14

## 2020-01-30 RX ADMIN — SODIUM CHLORIDE, SODIUM LACTATE, POTASSIUM CHLORIDE, AND CALCIUM CHLORIDE 125 ML/HR: 600; 310; 30; 20 INJECTION, SOLUTION INTRAVENOUS at 10:03

## 2020-01-30 RX ADMIN — SODIUM CHLORIDE 100 MCG: 9 INJECTION INTRAMUSCULAR; INTRAVENOUS; SUBCUTANEOUS at 10:47

## 2020-01-30 RX ADMIN — FENTANYL CITRATE 50 MCG: 50 INJECTION, SOLUTION INTRAMUSCULAR; INTRAVENOUS at 10:35

## 2020-01-30 RX ADMIN — ROCURONIUM BROMIDE 20 MG: 50 INJECTION, SOLUTION INTRAVENOUS at 10:50

## 2020-01-30 RX ADMIN — MIDAZOLAM 2 MG: 1 INJECTION INTRAMUSCULAR; INTRAVENOUS at 10:32

## 2020-01-30 RX ADMIN — GLYCOPYRROLATE 0.5 MG: 0.2 INJECTION, SOLUTION INTRAMUSCULAR; INTRAVENOUS at 13:26

## 2020-01-30 RX ADMIN — FENTANYL CITRATE 25 MCG: 50 INJECTION, SOLUTION INTRAMUSCULAR; INTRAVENOUS at 14:10

## 2020-01-30 RX ADMIN — SODIUM CHLORIDE 100 MCG: 9 INJECTION INTRAMUSCULAR; INTRAVENOUS; SUBCUTANEOUS at 12:29

## 2020-01-30 RX ADMIN — FENTANYL CITRATE 25 MCG: 50 INJECTION, SOLUTION INTRAMUSCULAR; INTRAVENOUS at 14:15

## 2020-01-30 RX ADMIN — PROPOFOL 100 MG: 10 INJECTION, EMULSION INTRAVENOUS at 10:37

## 2020-01-30 NOTE — ANESTHESIA PREPROCEDURE EVALUATION
Relevant Problems   No relevant active problems       Anesthetic History   No history of anesthetic complications            Review of Systems / Medical History  Patient summary reviewed, nursing notes reviewed and pertinent labs reviewed    Pulmonary  Within defined limits                 Neuro/Psych         TIA     Cardiovascular    Hypertension  Valvular problems/murmurs (s/p AVR)      Dysrhythmias : atrial fibrillation  Pacemaker      Comments: Stopped coumadin 1/25   GI/Hepatic/Renal  Within defined limits              Endo/Other      Hypothyroidism  Arthritis     Other Findings              Physical Exam    Airway  Mallampati: II  TM Distance: 4 - 6 cm  Neck ROM: normal range of motion   Mouth opening: Normal     Cardiovascular    Rhythm: regular  Rate: normal         Dental    Dentition: Lower dentition intact and Full upper dentures     Pulmonary  Breath sounds clear to auscultation               Abdominal         Other Findings            Anesthetic Plan    ASA: 3  Anesthesia type: general          Induction: Intravenous  Anesthetic plan and risks discussed with: Patient

## 2020-01-30 NOTE — DISCHARGE INSTRUCTIONS
Patient Education        Abdominal Hernia Repair: What to Expect at Home  Your Recovery  After surgery to repair your hernia, you are likely to have pain for a few days. You may also feel like you have the flu, and you may have a low fever and feel tired and nauseated. This is common. You should feel better after a few days and will probably feel much better in 7 days. For several weeks you may feel twinges or pulling in the hernia repair when you move. You may have some bruising around the area of your hernia repair. This is normal.  This care sheet gives you a general idea about how long it will take for you to recover. But each person recovers at a different pace. Follow the steps below to get better as quickly as possible. How can you care for yourself at home? Activity    · Rest when you feel tired. Getting enough sleep will help you recover.     · Try to walk each day. Start by walking a little more than you did the day before. Bit by bit, increase the amount you walk. Walking boosts blood flow and helps prevent pneumonia and constipation.     · If your doctor gives you an abdominal binder to wear, use it as directed. This is an elastic bandage that wraps around your belly and upper hips. It helps support your belly muscles after surgery.     · Avoid strenuous activities, such as biking, jogging, weight lifting, or aerobic exercise, until your doctor says it is okay.     · Avoid lifting anything that would make you strain. This may include heavy grocery bags and milk containers, a heavy briefcase or backpack, cat litter or dog food bags, a vacuum , or a child.     · Ask your doctor when you can drive again.     · Most people are able to return to work within 1 to 2 weeks after surgery. But if your job requires that you to do heavy lifting or strenuous activity, you may need to take 4 to 6 weeks off from work.     · You may shower 24 to 48 hours after surgery, if your doctor okays it.  Pat the cut (incision) dry. Do not take a bath for the first 2 weeks, or until your doctor tells you it is okay.     · Ask your doctor when it is okay for you to have sex. Diet    · You can eat your normal diet. If your stomach is upset, try bland, low-fat foods like plain rice, broiled chicken, toast, and yogurt.     · Drink plenty of fluids (unless your doctor tells you not to).     · You may notice that your bowel movements are not regular right after your surgery. This is common. Avoid constipation and straining with bowel movements. You may want to take a fiber supplement every day. If you have not had a bowel movement after a couple of days, ask your doctor about taking a mild laxative. Medicines    · Your doctor will tell you if and when you can restart your medicines. He or she will also give you instructions about taking any new medicines.     · If you take blood thinners, such as warfarin (Coumadin), clopidogrel (Plavix), or aspirin, be sure to talk to your doctor. He or she will tell you if and when to start taking those medicines again. Make sure that you understand exactly what your doctor wants you to do.     · Be safe with medicines. Take pain medicines exactly as directed. ? If the doctor gave you a prescription medicine for pain, take it as prescribed. ? If you are not taking a prescription pain medicine, ask your doctor if you can take an over-the-counter medicine.     · If your doctor prescribed antibiotics, take them as directed. Do not stop taking them just because you feel better. You need to take the full course of antibiotics.     · If you think your pain medicine is making you sick to your stomach:  ? Take your medicine after meals (unless your doctor has told you not to). ? Ask your doctor for a different pain medicine. Incision care    · If you have strips of tape on the cut (incision) the doctor made, leave the tape on for a week or until it falls off.  Or follow your doctor's instructions for removing the tape.     · If you have staples closing the cut, you will need to visit your doctor in 1 to 2 weeks to have them removed.     · Wash the area daily with warm, soapy water, and pat it dry. Don't use hydrogen peroxide or alcohol, which can slow healing. You may cover the area with a gauze bandage if it weeps or rubs against clothing. Change the bandage every day. Other instructions    · Hold a pillow over your incision when you cough or take deep breaths. This will support your belly and decrease your pain.     · Do breathing exercises at home as instructed by your doctor. This will help prevent pneumonia.     · If you had laparoscopic surgery, you may also have pain in your left shoulder. The pain usually lasts about a day or two. Follow-up care is a key part of your treatment and safety. Be sure to make and go to all appointments, and call your doctor if you are having problems. It's also a good idea to know your test results and keep a list of the medicines you take. When should you call for help? Call 911 anytime you think you may need emergency care. For example, call if:    · You passed out (lost consciousness).     · You are short of breath.    Call your doctor now or seek immediate medical care if:    · You are sick to your stomach and cannot drink fluids.     · You have signs of a blood clot in your leg (called a deep vein thrombosis), such as:  ? Pain in your calf, back of the knee, thigh, or groin. ? Redness and swelling in your leg or groin.     · You have signs of infection, such as:  ? Increased pain, swelling, warmth, or redness. ? Red streaks leading from the incision. ? Pus draining from the incision. ?  A fever.     · You cannot pass stools or gas.     · You have pain that does not get better after you take pain medicine.     · You have loose stitches, or your incision comes open.     · Bright red blood has soaked through the bandage over your incision.    Watch closely for changes in your health, and be sure to contact your doctor if you have any problems. Where can you learn more? Go to http://linden-[facility].info/. Enter B577 in the search box to learn more about \"Abdominal Hernia Repair: What to Expect at Home. \"  Current as of: November 7, 2018  Content Version: 12.2  © 5894-3469 [employer]. Care instructions adapted under license by [employer] (which disclaims liability or warranty for this information). If you have questions about a medical condition or this instruction, always ask your healthcare professional. [address] any warranty or liability for your use of this information. DISCHARGE SUMMARY from your Nurse    The following personal items collected during your admission are returned to you:   Dental Appliance: Dental Appliances: Uppers  Vision: Visual Aid: Glasses  Hearing Aid:    Jewelry: Jewelry: None  Clothing:    Other Valuables: Other Valuables: Eyeglasses  Valuables sent to safe:      PATIENT INSTRUCTIONS:    After general anesthesia or intravenous sedation, for 24 hours or while taking prescription Narcotics:  · Limit your activities  · Do not drive and operate hazardous machinery  · Do not make important personal or business decisions  · Do  not drink alcoholic beverages  · If you have not urinated within 8 hours after discharge, please contact your surgeon on call. Report the following to your surgeon:  · Excessive pain, swelling, redness or odor of or around the surgical area  · Temperature over 100.5  · Nausea and vomiting lasting longer than 4 hours or if unable to take medications  · Any signs of decreased circulation or nerve impairment to extremity: change in color, persistent  numbness, tingling, coldness or increase pain  · Any questions    COUGH AND DEEP BREATHE    Breathing deep and coughing are very important exercises to do after surgery.   Deep breathing and coughing open the little air tubes and air sacks in your lungs. You take deep breaths every day. You may not even notice - it is just something you do when you sigh or yawn. It is a natural exercise you do to keep these air passages open. After surgery, take deep breaths and cough, on purpose. Coughing and deep breathing help prevent bronchitis and pneumonia after surgery. If you had chest or belly surgery, use a pillow as a \"hug kaylee\" and hold it tightly to your chest or belly when you cough. DIRECTIONS:  6. Take 10 to 15 slow deep breaths every hour while awake. 7. Breathe in deeply, and hold it for 2 seconds. 8. Exhale slowly through puckered lips, like blowing up a balloon. 9. After every 4th or 5th deep breath, hug your pillow to your chest or belly and give a hard, deep cough. Yes, it will probably hurt. But doing this exercise is very important part of healing after surgery. Take your pain medicine to help you do this exercise without too much pain. IF YOU HAVE BEEN DIAGNOSED WITH SLEEP APNEA, PLEASE USE YOUR SLEEP APNEA DEVICE OR CPAP MACHINE WHEN YOU INTEND TO NAP AFTER TAKING PAIN MEDICATION. Ankle Pumps    Ankle pumps increase the circulation of oxygenated blood to your lower extremities and decrease your risk for circulation problems such as blood clots. They also stretch the muscles, tendons and ligaments in your foot and ankle, and prevent joint contracture in the ankle and foot, especially after surgeries on the legs. It is important to do ankle pump exercises regularly after surgery because immobility increases your risk for developing a blood clot. Your doctor may also have you take an Aspirin for the next few days as well. If your doctor did not ask you to take an Aspirin, consult with him before starting Aspirin therapy on your own. Slowly point your foot forward, feeling the muscles on the top of your lower leg stretch, and hold this position for 5 seconds. Next, pull your foot back toward you as far as possible, stretching the calf muscles, and hold that position for 5 seconds. Repeat with the other foot. Perform 10 repetitions every hour while awake for both ankles if possible (down and then up with the foot once is one repetition). You should feel gentle stretching of the muscles in your lower leg when doing this exercise. If you feel pain, or your range of motion is limited, don't  Push too hard. Only go the limit your joint and muscles will let you go. If you have increasing pain, progressively worsening leg warmth or swelling, STOP the exercise and call your doctor. Below is information about the medications your doctor is prescribing after your visit:    Other information in your discharge envelope:  []     PRESCRIPTIONS  []     PHYSICAL THERAPY PRESCRIPTION  []     APPOINTMENT CARDS  []     Regional Anesthesia Pamphlet for block or block with On-Q Catheter from Anesthesia Service  []     Medical device information sheets/pamphlets from their    []     School/work excuse note. []     /parent work excuse note. These are general instructions for a healthy lifestyle:    *  Please give a list of your current medications to your Primary Care Provider. *  Please update this list whenever your medications are discontinued, doses are      changed, or new medications (including over-the-counter products) are added. *  Please carry medication information at all times in case of emergency situations. About Smoking  No smoking / No tobacco products / Avoid exposure to second hand smoke    Surgeon General's Warning:  Quitting smoking now greatly reduces serious risk to your health. Obesity, smoking, and sedentary lifestyle greatly increases your risk for illness and disease. A healthy diet, regular physical exercise & weight monitoring are important for maintaining a healthy lifestyle.     Congestive Heart Failure  You may be retaining fluid if you have a history of heart failure or if you experience any of the following symptoms:  Weight gain of 3 pounds or more overnight or 5 pounds in a week, increased swelling in our hands or feet or shortness of breath while lying flat in bed. Please call your doctor as soon as you notice any of these symptoms; do not wait until your next office visit. Recognize signs and symptoms of STROKE:  F - face looks uneven  A - arms unable to move or move even  S - speech slurred or non-existent  T - time-call 911 as soon as signs and symptoms begin-DO NOT go         Back to bed or wait to see if you get better-TIME IS BRAIN. Warning signs of HEART ATTACK  Call 911 if you have these symptoms    · Chest discomfort. Most heart attacks involve discomfort in the center of the chest that lasts more than a few minutes, or that goes away and comes back. It can feel like uncomfortable pressure, squeezing, fullness, or pain. · Discomfort in other areas of the upper body. Symptoms can include pain or discomfort in one or both        Arms, the back, neck, jaw, or stomach. ·  Shortness of breath with or without chest discomfort. · Other signs may include breaking out in a cold sweat, nausea, or lightheadedness    Don't wait more than five minutes to call 911 - MINUTES MATTER! Fast action can save your life. Calling 911 is almost always the fastest way to get lifesaving treatment. Emergency Medical Services staff can begin treatment when they arrive - up to an hour sooner than if someone gets to the hospital by car. [facility] MEDICATION AND SIDE EFFECT GUIDE    The [facility] MEDICATION AND SIDE EFFECT GUIDE was provided to the PATIENT AND CARE PROVIDER.   Information provided includes instruction about drug purpose and common side effects for the following medications:    · Dilaudid  · Miralax  · Tylenol

## 2020-01-30 NOTE — BRIEF OP NOTE
BRIEF OPERATIVE NOTE    Date of Procedure: 1/30/2020   Preoperative Diagnosis: BILATERAL INGUINAL AND INCISIONAL HERNIAS  Postoperative Diagnosis: Right Indirect/Direct Inguinal  Left direct, indirect inguinoscrotal   INCISIONAL HERNIA  Procedure(s):  ROBOTIC ASSISTED BILATERAL INGUINAL HERNIA REPAIR WITH MESH  Robot assisted INCISIONAL HERNIA REPAIR WITH MESH    Surgeon(s) and Role:     * Tobin Murphy MD - Primary         Surgical Assistant: May    Surgical Staff:  Circ-1: Slim Tolliver RN; Gavin Rosen RN  Scrub Tech-1: Chaitanya Milton  Surg Asst-1: Felicia Shi  Surg Asst-2: Chiquis Rosa  Event Time In Time Out   Incision Start 1111    Incision Close       Anesthesia: General   Estimated Blood Loss: Min  Specimens: * No specimens in log *   Findings: 3 cm left indirect inguinoscrotal, 1 cm left direct  2 cm right indirect, 2 cm right direct  2 cm, 1 cm supraumbilical/epigastric incisional   Complications: none  Implants:   Implant Name Type Inv. Item Serial No.  Lot No. LRB No. Used Action   MESH NEL LG LT 4X6IN -- 3DMAX - SNA  MESH NEL LG LT 4X6IN -- 3DMAX NA BARD DAVOL [id number] Left 1 Implanted   MESH NEL LG 4X6IN R LF -- 3DMAX - SNA  MESH NEL LG 4X6IN R LF -- 3DMAX NA BARD [employer]OL [id number] Right 1 Implanted   MESH NEL [place] 4. 5IN -- VENTRALIGHT S/T - SNA  MESH NEL [place] 4. 5IN -- VENTRALIGHT S/T NA BARD DAVOL [id number] N/A 1 Implanted

## 2020-01-30 NOTE — OP NOTES
OPERATIVE NOTE    Date of Procedure: 1/30/2020   Preoperative Diagnosis: BILATERAL INGUINAL AND INCISIONAL HERNIAS  Postoperative Diagnosis: Right Indirect/Direct Inguinal  Left direct, indirect inguinoscrotal   INCISIONAL HERNIA  Procedure(s):  ROBOTIC ASSISTED BILATERAL INGUINAL HERNIA REPAIR WITH MESH  Robot assisted INCISIONAL HERNIA REPAIR WITH MESH    Surgeon(s) and Role:     * Gwen Ortega MD - Primary         Surgical Assistant: May    Surgical Staff:  Circ-1: Hortensia Severs, RN; Reyes Mann, RN  Scrub Tech-1: Lenore Manuel  Surg Asst-1: Felicia Shi  Surg Asst-2: Kelin Gunter  Event Time In Time Out   Incision Start 1111    Incision Close       Anesthesia: General   Estimated Blood Loss: Min  Specimens: * No specimens in log *   Findings: 3 cm left indirect inguinoscrotal, 1 cm left direct  2 cm right indirect, 2 cm right direct  2 cm, 1 cm supraumbilical/epigastric incisional   Complications: none  Implants:   Implant Name Type Inv. Item Serial No.  Lot No. LRB No. Used Action   MESH NEL LG LT 4X6IN -- 3DMAX - SNA  MESH NEL LG LT 4X6IN -- 3DMAX NA [employer] [id number] Left 1 Implanted   MESH NEL LG 4X6IN R LF -- 3DMAX - SNA  MESH NEL LG 4X6IN R LF -- 3DMAX NA BARD [employer]OL [id number] Right 1 Implanted   MESH NEL [place] 4. 5IN -- VENTRALIGHT S/T - SNA  MESH NEL [place] 4. 5IN -- VENTRALIGHT S/T NA BARD [employer]OL [id number] N/A 1 Implanted     Indication:  See H/P. Procedure:  Site of surgery and procedure was verified and marked in pre-operative holding and again in the operating room. Preoperative antibiotics were given 30 minutes prior to skin incision. Sequential compression devices were placed and turned on. Patient placed supine and general anesthesia was instituted sucessfully. Both arms were tucked and the abdomen was prepped and draped in usual fashion. Gillespie catheter was placed.   Exparel expanded with 0.5% plain marcaine was injected subcutaneously along the projected port sites. Supra-umbilical incision was made, 5mm port was passed across the umbilical hernia under direct vision. Insufflation was establised and maintained. 8mm ports were placed in usual position and the 5mm port was exchanged for 8mm port. Robot was brought in and docked. The right sided defect was approached first.  Lazaro's ligament, the inferior epigastric vessels, the spermatic cord,  the position of the iliac vessels and the iliopubic tract were clearly identified  The peritoneum was divided along a line 2cm above the superior edge of the hernia defect to the anterior superior iliac spine. The peritoneal flap was mobilized inferiorly using blunt dissection. The pubic symphysis was identified. Pre-peritoneal fat was dissected carefully from spermatic cord and vas. The peritoneum from Lazaro's ligament was dissected to its junction with the femoral vein. The dissection was continued to the iliopubic tract, with care to avoid injury to the femoral branch of the genitofemoral nerve and the lateral femoral cutaneous nerve by  peritoneum from transversalis fascia. Attention was then turned to the larger left side. Lazaro's ligament, the inferior epigastric vessels, the spermatic cord,  the position of the iliac vessels and the iliopubic tract were clearly identified  The peritoneum divide was continued from midline to the left side, 2 cm above anterior superior iliac spine. The peritoneal flap was mobilized inferiorly using blunt dissection. Pubic symphysis was identified. Pre-peritoneal fat was dissected carefully from spermatic cord and vas. The peritoneum from Lazaro's ligament was dissected to it's junction with the femoral vein. The dissection was continued to the iliopubic tract, with care to avoid injury to the femoral branch of the genitofemoral nerve and the lateral femoral cutaneous nerve by continuing the separation of peritoneum from transversalis fascia.    A pre-peritoneal direct space lipoma was reduced, carefully transected and discarded. At this juncture, a large left synthetic mesh was prepared and introduced into the operative field. It was deployed over the myopectineal orifice to cover the direct, indirect and femoral spaces. Special care was taken to cover Lazaro's ligament medially and appropriate lateral and anterior mesh. The mesh was secured with 0 ethibond suture to the shelving edge and anterior abdominal wall above iliopubic tract. The left sided peritoneal flap was re-approximated with running 2-0 v-loc suture. A large right synthetic mesh was prepared and introduced into the operative field. It was deployed over the myopectineal orifice to cover the direct, indirect and femoral spaces. Special care was taken to cover Lazaro's ligament medially and appropriate lateral and anterior mesh. The mesh was secured with 0 ethibond suture to the shelving edge and anterior abdominal wall above iliopubic tract. The right sided peritoneal flap was re-approximated with running 2-0 v-loc suture. Instruments were undocked, the boom turned 180 to face the upper abdomen and instruments replaced. Attention was turned to the epigastric incisional defect. Pre-peritoneal flaps were raised caudally and cephalad for approximately 12 cm along the patient's midline to expose the posterior sheath. The two incisional defects were visualized and were measured at approximately 3 x 3 cm. Images were archived for the electronic chart. The hernias were re-approximated with 1 PDS unidirectional suture. A 10 cm partially absorbable mesh was introduced through the 8 mm assistant port, oriented appropriately to the midline of the abdomen and secured into place by running several 2-0 PDS sutures about the peripery of the centered mesh. The trocars were removed under direct vision and the pneumoperitoneum was evacuated.   Sponge, instruments and needle counts were correct. Skin was closed with 4-0 Monocryl, skin apposed with steristrips and tegaderm. The patient awoke from anesthesia in satisfactory condition. I went to discuss my findings with his wife in the waiting area.      Leslie Garcia MD

## 2020-01-30 NOTE — DISCHARGE SUMMARY
Discharge Summary    Patient: Maria Dolores Temple               Sex: male          DOA: 1/30/2020  8:37 AM       YOB: 1945      Age:  69 y.o.        LOS:  LOS: 0 days                Discharge Date:      Admission Diagnoses: [address]    Discharge Diagnoses:  Same    Procedure:  Procedure(s):  ROBOTIC ASSISTED BILATERAL INGUINAL HERNIA WITH MESH  [address] ASSISTED    Discharge Condition: [address] [facility] Course: Unremarkable operative procedure. Discharge to home in stable condition. Consults: None    Significant Diagnostic Studies: See full electronic record. Discharge Medications:     Current Discharge Medication List      START taking these medications    Details   HYDROmorphone (DILAUDID) 2 mg tablet Take 0.5 Tabs by mouth every four (4) hours as needed for Pain for up to 3 days. Max Daily Amount: 6 mg. Indications: pain  Qty: 8 Tab, Refills: 0    Associated Diagnoses: Non-recurrent bilateral inguinal hernia without obstruction or gangrene      polyethylene glycol (MIRALAX) 17 gram packet Take 1 Packet by mouth daily. 17 g = 1 packet  Indications: constipation, If constipation last more than 24-48 hours, despite colace use. Qty: 238 g, Refills: 0         CONTINUE these medications which have CHANGED    Details   acetaminophen (TYLENOL) 325 mg tablet Take 3 Tabs by mouth every eight (8) hours as needed for Pain. Qty: 90 Tab, Refills: 1         CONTINUE these medications which have NOT CHANGED    Details   enoxaparin (LOVENOX) 100 mg/mL by SubCUTAneous route every twelve (12) hours. levothyroxine (SYNTHROID) 25 mcg tablet Take 25 mcg by mouth Daily (before breakfast). sacubitril-valsartan (ENTRESTO) 49 mg/51 mg tablet Take 1 Tab by mouth two (2) times a day. spironolactone (ALDACTONE) 25 mg tablet Take 25 mg by mouth two (2) times a day.       pravastatin (PRAVACHOL) 40 mg tablet Take 1 Tab by mouth nightly. Qty: 90 Tab, Refills: 1    Associated Diagnoses: Transient cerebral ischemia, unspecified type      amoxicillin-clavulanate (AUGMENTIN) 875-125 mg per tablet Take 1 Tab by mouth two (2) times a day. aspirin delayed-release 81 mg tablet Take 81 mg by mouth daily. warfarin (COUMADIN) 2.5 mg tablet Take 2.5 mg by mouth nightly. Cholecalciferol, Vitamin D3, (VITAMIN D3) 1,000 unit chew Take 2,000 Units by mouth daily. Activity/Diet/Wound Care: See patient administered discharge instructions.     Follow-up: 2 weeks    Marisela King MD  [facility]  Office:  [phone]  Fax:  [phone]

## 2020-01-30 NOTE — ANESTHESIA POSTPROCEDURE EVALUATION
Procedure(s):  ROBOTIC ASSISTED BILATERAL INGUINAL HERNIA WITH MESH AND INCISIONAL HERNIA REPAIR WITH MESH  HERNIA VENTRAL REPAIR ROBOTIC ASSISTED.     general    Anesthesia Post Evaluation        Patient location during evaluation: PACU  Level of consciousness: awake  Pain management: adequate  Airway patency: patent  Anesthetic complications: no  Cardiovascular status: acceptable  Respiratory status: acceptable  Hydration status: acceptable  Post anesthesia nausea and vomiting:  none      Vitals Value Taken Time   /71 1/30/2020  2:20 PM   Temp 36.7 °C (98 °F) 1/30/2020  1:47 PM   Pulse 75 1/30/2020  2:25 PM   Resp 14 1/30/2020  2:25 PM   SpO2 97 % 1/30/2020  2:25 PM

## 2020-01-30 NOTE — H&P
Assessment:     Symptomatic left inguinal hernia, small right inguinal hernia, symptomatic incisional hernia  Lipoma    Plan:     Robot assisted bilateral inguinal hernia repair with mesh  Robot assisted incisional hernia repair with mesh  Possible excision lipoma    Signed By: Carol Gomez MD  [facility]  Office:  [phone]  Fax:  [phone]    January 30, 2020          General Surgery History    Subjective:      Nancy Bourne is a 69 y.o.  male who presents with multiple hernia, back lipoma. See paper h/p for full details.      Past Medical History:   Diagnosis Date    Anticoagulated on Coumadin 11/27/2017    Arthritis     Atrial fibrillation (HCC)     PAF    Cardiomyopathy ([address]     GI bleed 02/2017    HTN (hypertension)     Hypothyroidism     Melanoma in situ ([address]     Orthostatic hypotension 11/29/2017    Pacemaker 11/28/2017    Medtronic BiV ICD    S/P AVR (aortic valve replacement)     mechanical AVR    Syncope 2889    Systolic heart failure ([address]     TIA (transient ischemic attack) 2017     Past Surgical History:   Procedure Laterality Date    HX AORTIC VALVE REPLACEMENT  2003    mechanical aortic valve     HX KNEE ARTHROSCOPY Right 1980's    HX MOHS PROCEDURES Left 2005    Cheek and Sikhism    HX PACEMAKER  2011    with defibulator    HX TONSILLECTOMY  1952      Family History   Problem Relation Age of Onset    Cancer Father      Social History     Socioeconomic History    Marital status:      Spouse name: Not on file    Number of children: Not on file    Years of education: Not on file    Highest education level: Not on file   Tobacco Use    Smoking status: Former Smoker    Smokeless tobacco: Never Used   Substance and Sexual Activity    Alcohol use: Not Currently    Drug use: No    Sexual activity: Yes      Current Facility-Administered Medications   Medication Dose Route Frequency    lidocaine (PF) (XYLOCAINE) 10 mg/mL (1 %) injection 0.1 mL 0.1 mL SubCUTAneous PRN    lactated Ringers infusion  125 mL/hr IntraVENous CONTINUOUS    sodium chloride (NS) flush 5-40 mL  5-40 mL IntraVENous Q8H    sodium chloride (NS) flush 5-40 mL  5-40 mL IntraVENous PRN    naloxone (NARCAN) injection 0.04 mg  0.04 mg IntraVENous Multiple    flumazeniL (ROMAZICON) 0.1 mg/mL injection 0.2 mg  0.2 mg IntraVENous Multiple    ceFAZolin (ANCEF) 2 g in sterile water (preservative free) 20 mL IV syringe  2 g IntraVENous ONCE      No Known Allergies    Review of Systems:     []     Unable to obtain  ROS due to  []    mental status change  []    sedated   []    intubated   [x]    Total of 12 system negative, unless specified below or in HPI:  Constitutional: negative fever, negative chills, negative weight loss  Eyes:   negative visual changes  ENT:   negative sore throat, tongue or lip swelling  Respiratory:  negative cough, negative dyspnea  Cards:  negative for chest pain, palpitations, lower extremity edema  GI:   negative for nausea, vomiting, diarrhea, and abdominal pain  :  negative for frequency, dysuria  Integument:  negative for rash and pruritus  Heme:  negative for easy bruising and gum/nose bleeding  Musculoskel: negative for myalgias,  back pain and muscle weakness  Neuro:  negative for headaches, dizziness, vertigo  Psych:  negative for feelings of anxiety, depression     Objective:        Patient Vitals for the past 8 hrs:   BP Pulse Resp SpO2 Height Weight   01/30/20 0913 146/78 81 18 100 % 6' (1.829 m) 86.6 kg (191 lb)       No data recorded. Physical Exam:  General:  Alert, cooperative, no distress, appears stated age. Eyes:  Conjunctivae/corneas clear. PERRL, EOMs intact. Nose: Nares normal. Septum midline. Mucosa normal. No drainage or sinus tenderness.    Mouth/Throat: Lips, mucosa, and tongue normal. Teeth and gums normal.   Neck: Supple, symmetrical, trachea midline, no adenopathy, thyroid: no enlargment/tenderness/nodules, no carotid bruit and no JVD. Back:   Symmetric, no curvature. ROM normal. No CVA tenderness. Lungs:   Clear to auscultation bilaterally. Heart:  Regular rate and rhythm, S1, S2 normal, no murmur, click, rub or gallop. Abdomen:   Soft, reducible bilateral groin bulge. Supraumbilical reducible bulge. Bowel sounds normal. No masses,  No organomegaly. Extremities: Extremities normal, atraumatic, no cyanosis or edema. Pulses: 2+ and symmetric all extremities. Skin: Skin color, texture, turgor normal. No rashes or lesions   Lymph nodes: Cervical, supraclavicular, and axillary nodes normal.     Labs: No results for input(s): WBC, HGB, HCT, PLT, HGBEXT, HCTEXT, PLTEXT in the last 72 hours. No results for input(s): NA, K, CL, CO2, GLU, BUN, CREA, CA, MG, PHOS, ALB, TBIL, TBILI, SGOT, ALT in the last 72 hours. No results for input(s): INR, INREXT in the last 72 hours.

## 2020-02-02 ENCOUNTER — HOSPITAL ENCOUNTER (INPATIENT)
Age: 75
LOS: 7 days | Discharge: HOME HEALTH | DRG: 811 | End: 2020-02-10
Attending: EMERGENCY MEDICINE | Admitting: SURGERY
Payer: MEDICARE

## 2020-02-02 ENCOUNTER — APPOINTMENT (OUTPATIENT)
Dept: CT IMAGING | Age: 75
DRG: 811 | End: 2020-02-02
Attending: EMERGENCY MEDICINE
Payer: MEDICARE

## 2020-02-02 ENCOUNTER — APPOINTMENT (OUTPATIENT)
Dept: GENERAL RADIOLOGY | Age: 75
DRG: 811 | End: 2020-02-02
Attending: EMERGENCY MEDICINE
Payer: MEDICARE

## 2020-02-02 DIAGNOSIS — E87.20 LACTIC ACIDOSIS: ICD-10-CM

## 2020-02-02 DIAGNOSIS — E86.0 DEHYDRATION: Primary | ICD-10-CM

## 2020-02-02 DIAGNOSIS — I95.9 HYPOTENSION, UNSPECIFIED HYPOTENSION TYPE: ICD-10-CM

## 2020-02-02 DIAGNOSIS — R10.84 ABDOMINAL PAIN, GENERALIZED: ICD-10-CM

## 2020-02-02 LAB
ALBUMIN SERPL-MCNC: 3.3 G/DL (ref 3.5–5)
ALBUMIN/GLOB SERPL: 1.1 {RATIO} (ref 1.1–2.2)
ALP SERPL-CCNC: 67 U/L (ref 45–117)
ALT SERPL-CCNC: 16 U/L (ref 12–78)
ANION GAP SERPL CALC-SCNC: 9 MMOL/L (ref 5–15)
APTT PPP: 44 SEC (ref 22.1–32)
AST SERPL-CCNC: 13 U/L (ref 15–37)
BASOPHILS # BLD: 0.1 K/UL (ref 0–0.1)
BASOPHILS NFR BLD: 1 % (ref 0–1)
BILIRUB DIRECT SERPL-MCNC: 0.3 MG/DL (ref 0–0.2)
BILIRUB SERPL-MCNC: 1.2 MG/DL (ref 0.2–1)
BUN SERPL-MCNC: 26 MG/DL (ref 6–20)
BUN/CREAT SERPL: 17 (ref 12–20)
CALCIUM SERPL-MCNC: 8.3 MG/DL (ref 8.5–10.1)
CHLORIDE SERPL-SCNC: 104 MMOL/L (ref 97–108)
CK SERPL-CCNC: 47 U/L (ref 39–308)
CO2 SERPL-SCNC: 23 MMOL/L (ref 21–32)
COMMENT, HOLDF: NORMAL
CREAT SERPL-MCNC: 1.56 MG/DL (ref 0.7–1.3)
DIFFERENTIAL METHOD BLD: ABNORMAL
EOSINOPHIL # BLD: 0 K/UL (ref 0–0.4)
EOSINOPHIL NFR BLD: 0 % (ref 0–7)
ERYTHROCYTE [DISTWIDTH] IN BLOOD BY AUTOMATED COUNT: 15.3 % (ref 11.5–14.5)
GLOBULIN SER CALC-MCNC: 2.9 G/DL (ref 2–4)
GLUCOSE SERPL-MCNC: 165 MG/DL (ref 65–100)
HCT VFR BLD AUTO: 29.7 % (ref 36.6–50.3)
HGB BLD-MCNC: 9.7 G/DL (ref 12.1–17)
IMM GRANULOCYTES # BLD AUTO: 0.1 K/UL (ref 0–0.04)
IMM GRANULOCYTES NFR BLD AUTO: 1 % (ref 0–0.5)
INR PPP: 2.9 (ref 0.9–1.1)
LACTATE SERPL-SCNC: 2.8 MMOL/L (ref 0.4–2)
LACTATE SERPL-SCNC: 3.4 MMOL/L (ref 0.4–2)
LIPASE SERPL-CCNC: 66 U/L (ref 73–393)
LYMPHOCYTES # BLD: 2 K/UL (ref 0.8–3.5)
LYMPHOCYTES NFR BLD: 14 % (ref 12–49)
MAGNESIUM SERPL-MCNC: 1.6 MG/DL (ref 1.6–2.4)
MCH RBC QN AUTO: 30.3 PG (ref 26–34)
MCHC RBC AUTO-ENTMCNC: 32.7 G/DL (ref 30–36.5)
MCV RBC AUTO: 92.8 FL (ref 80–99)
MONOCYTES # BLD: 1.2 K/UL (ref 0–1)
MONOCYTES NFR BLD: 8 % (ref 5–13)
NEUTS SEG # BLD: 11.1 K/UL (ref 1.8–8)
NEUTS SEG NFR BLD: 76 % (ref 32–75)
NRBC # BLD: 0 K/UL (ref 0–0.01)
NRBC BLD-RTO: 0 PER 100 WBC
PLATELET # BLD AUTO: 266 K/UL (ref 150–400)
PMV BLD AUTO: 10.5 FL (ref 8.9–12.9)
POTASSIUM SERPL-SCNC: 4.2 MMOL/L (ref 3.5–5.1)
PROT SERPL-MCNC: 6.2 G/DL (ref 6.4–8.2)
PROTHROMBIN TIME: 27.7 SEC (ref 9–11.1)
RBC # BLD AUTO: 3.2 M/UL (ref 4.1–5.7)
SAMPLES BEING HELD,HOLD: NORMAL
SODIUM SERPL-SCNC: 136 MMOL/L (ref 136–145)
THERAPEUTIC RANGE,PTTT: ABNORMAL SECS (ref 58–77)
TROPONIN I SERPL-MCNC: <0.05 NG/ML
WBC # BLD AUTO: 14.5 K/UL (ref 4.1–11.1)

## 2020-02-02 PROCEDURE — 99218 HC RM OBSERVATION: CPT

## 2020-02-02 PROCEDURE — 85025 COMPLETE CBC W/AUTO DIFF WBC: CPT

## 2020-02-02 PROCEDURE — 85610 PROTHROMBIN TIME: CPT

## 2020-02-02 PROCEDURE — 36415 COLL VENOUS BLD VENIPUNCTURE: CPT

## 2020-02-02 PROCEDURE — 74176 CT ABD & PELVIS W/O CONTRAST: CPT

## 2020-02-02 PROCEDURE — 86923 COMPATIBILITY TEST ELECTRIC: CPT

## 2020-02-02 PROCEDURE — 83690 ASSAY OF LIPASE: CPT

## 2020-02-02 PROCEDURE — 96360 HYDRATION IV INFUSION INIT: CPT

## 2020-02-02 PROCEDURE — 85730 THROMBOPLASTIN TIME PARTIAL: CPT

## 2020-02-02 PROCEDURE — 99284 EMERGENCY DEPT VISIT MOD MDM: CPT

## 2020-02-02 PROCEDURE — 96361 HYDRATE IV INFUSION ADD-ON: CPT

## 2020-02-02 PROCEDURE — 87040 BLOOD CULTURE FOR BACTERIA: CPT

## 2020-02-02 PROCEDURE — 80076 HEPATIC FUNCTION PANEL: CPT

## 2020-02-02 PROCEDURE — 84484 ASSAY OF TROPONIN QUANT: CPT

## 2020-02-02 PROCEDURE — 86900 BLOOD TYPING SEROLOGIC ABO: CPT

## 2020-02-02 PROCEDURE — 82550 ASSAY OF CK (CPK): CPT

## 2020-02-02 PROCEDURE — 74011250636 HC RX REV CODE- 250/636: Performed by: EMERGENCY MEDICINE

## 2020-02-02 PROCEDURE — 83735 ASSAY OF MAGNESIUM: CPT

## 2020-02-02 PROCEDURE — 71045 X-RAY EXAM CHEST 1 VIEW: CPT

## 2020-02-02 PROCEDURE — 80048 BASIC METABOLIC PNL TOTAL CA: CPT

## 2020-02-02 PROCEDURE — 83605 ASSAY OF LACTIC ACID: CPT

## 2020-02-02 RX ORDER — HYDROMORPHONE HYDROCHLORIDE 1 MG/ML
0.5 INJECTION, SOLUTION INTRAMUSCULAR; INTRAVENOUS; SUBCUTANEOUS
Status: DISCONTINUED | OUTPATIENT
Start: 2020-02-02 | End: 2020-02-06

## 2020-02-02 RX ORDER — OXYCODONE HYDROCHLORIDE 5 MG/1
5 TABLET ORAL
Status: DISCONTINUED | OUTPATIENT
Start: 2020-02-02 | End: 2020-02-10 | Stop reason: HOSPADM

## 2020-02-02 RX ORDER — ACETAMINOPHEN 325 MG/1
650 TABLET ORAL
Status: DISCONTINUED | OUTPATIENT
Start: 2020-02-02 | End: 2020-02-10 | Stop reason: HOSPADM

## 2020-02-02 RX ORDER — ONDANSETRON 2 MG/ML
4 INJECTION INTRAMUSCULAR; INTRAVENOUS
Status: DISCONTINUED | OUTPATIENT
Start: 2020-02-02 | End: 2020-02-06

## 2020-02-02 RX ORDER — SODIUM CHLORIDE, SODIUM LACTATE, POTASSIUM CHLORIDE, CALCIUM CHLORIDE 600; 310; 30; 20 MG/100ML; MG/100ML; MG/100ML; MG/100ML
125 INJECTION, SOLUTION INTRAVENOUS CONTINUOUS
Status: DISCONTINUED | OUTPATIENT
Start: 2020-02-02 | End: 2020-02-05

## 2020-02-02 RX ORDER — SODIUM CHLORIDE 0.9 % (FLUSH) 0.9 %
5-40 SYRINGE (ML) INJECTION EVERY 8 HOURS
Status: DISCONTINUED | OUTPATIENT
Start: 2020-02-02 | End: 2020-02-10 | Stop reason: HOSPADM

## 2020-02-02 RX ORDER — SODIUM CHLORIDE 0.9 % (FLUSH) 0.9 %
5-40 SYRINGE (ML) INJECTION AS NEEDED
Status: DISCONTINUED | OUTPATIENT
Start: 2020-02-02 | End: 2020-02-10 | Stop reason: HOSPADM

## 2020-02-02 RX ORDER — OXYCODONE HYDROCHLORIDE 5 MG/1
10 TABLET ORAL
Status: DISCONTINUED | OUTPATIENT
Start: 2020-02-02 | End: 2020-02-10 | Stop reason: HOSPADM

## 2020-02-02 RX ADMIN — SODIUM CHLORIDE 1000 ML: 900 INJECTION, SOLUTION INTRAVENOUS at 21:49

## 2020-02-02 RX ADMIN — SODIUM CHLORIDE 1000 ML: 900 INJECTION, SOLUTION INTRAVENOUS at 23:54

## 2020-02-02 RX ADMIN — SODIUM CHLORIDE 1000 ML: 900 INJECTION, SOLUTION INTRAVENOUS at 20:37

## 2020-02-02 RX ADMIN — SODIUM CHLORIDE 1000 ML: 900 INJECTION, SOLUTION INTRAVENOUS at 20:51

## 2020-02-03 LAB
ANION GAP SERPL CALC-SCNC: 7 MMOL/L (ref 5–15)
BASOPHILS # BLD: 0 K/UL (ref 0–0.1)
BASOPHILS NFR BLD: 0 % (ref 0–1)
BUN SERPL-MCNC: 26 MG/DL (ref 6–20)
BUN/CREAT SERPL: 20 (ref 12–20)
CALCIUM SERPL-MCNC: 7.4 MG/DL (ref 8.5–10.1)
CHLORIDE SERPL-SCNC: 111 MMOL/L (ref 97–108)
CO2 SERPL-SCNC: 22 MMOL/L (ref 21–32)
CREAT SERPL-MCNC: 1.27 MG/DL (ref 0.7–1.3)
DIFFERENTIAL METHOD BLD: ABNORMAL
EOSINOPHIL # BLD: 0 K/UL (ref 0–0.4)
EOSINOPHIL NFR BLD: 0 % (ref 0–7)
ERYTHROCYTE [DISTWIDTH] IN BLOOD BY AUTOMATED COUNT: 15.6 % (ref 11.5–14.5)
GLUCOSE SERPL-MCNC: 152 MG/DL (ref 65–100)
HCT VFR BLD AUTO: 22.3 % (ref 36.6–50.3)
HGB BLD-MCNC: 7.1 G/DL (ref 12.1–17)
IMM GRANULOCYTES # BLD AUTO: 0.1 K/UL (ref 0–0.04)
IMM GRANULOCYTES NFR BLD AUTO: 1 % (ref 0–0.5)
LYMPHOCYTES # BLD: 1.1 K/UL (ref 0.8–3.5)
LYMPHOCYTES NFR BLD: 11 % (ref 12–49)
MCH RBC QN AUTO: 30.5 PG (ref 26–34)
MCHC RBC AUTO-ENTMCNC: 31.8 G/DL (ref 30–36.5)
MCV RBC AUTO: 95.7 FL (ref 80–99)
MONOCYTES # BLD: 0.8 K/UL (ref 0–1)
MONOCYTES NFR BLD: 8 % (ref 5–13)
NEUTS SEG # BLD: 8.2 K/UL (ref 1.8–8)
NEUTS SEG NFR BLD: 80 % (ref 32–75)
NRBC # BLD: 0 K/UL (ref 0–0.01)
NRBC BLD-RTO: 0 PER 100 WBC
PLATELET # BLD AUTO: 206 K/UL (ref 150–400)
PMV BLD AUTO: 10.9 FL (ref 8.9–12.9)
POTASSIUM SERPL-SCNC: 4.7 MMOL/L (ref 3.5–5.1)
RBC # BLD AUTO: 2.33 M/UL (ref 4.1–5.7)
SODIUM SERPL-SCNC: 140 MMOL/L (ref 136–145)
WBC # BLD AUTO: 10.2 K/UL (ref 4.1–11.1)

## 2020-02-03 PROCEDURE — 74011250636 HC RX REV CODE- 250/636: Performed by: SURGERY

## 2020-02-03 PROCEDURE — P9016 RBC LEUKOCYTES REDUCED: HCPCS

## 2020-02-03 PROCEDURE — 36430 TRANSFUSION BLD/BLD COMPNT: CPT

## 2020-02-03 PROCEDURE — 80048 BASIC METABOLIC PNL TOTAL CA: CPT

## 2020-02-03 PROCEDURE — 85025 COMPLETE CBC W/AUTO DIFF WBC: CPT

## 2020-02-03 PROCEDURE — 99218 HC RM OBSERVATION: CPT

## 2020-02-03 PROCEDURE — 30233N1 TRANSFUSION OF NONAUTOLOGOUS RED BLOOD CELLS INTO PERIPHERAL VEIN, PERCUTANEOUS APPROACH: ICD-10-PCS | Performed by: SURGERY

## 2020-02-03 PROCEDURE — 74011250637 HC RX REV CODE- 250/637: Performed by: SURGERY

## 2020-02-03 PROCEDURE — 65270000029 HC RM PRIVATE

## 2020-02-03 RX ORDER — SODIUM CHLORIDE 9 MG/ML
250 INJECTION, SOLUTION INTRAVENOUS AS NEEDED
Status: DISCONTINUED | OUTPATIENT
Start: 2020-02-03 | End: 2020-02-10 | Stop reason: HOSPADM

## 2020-02-03 RX ADMIN — SODIUM CHLORIDE, SODIUM LACTATE, POTASSIUM CHLORIDE, AND CALCIUM CHLORIDE 125 ML/HR: 600; 310; 30; 20 INJECTION, SOLUTION INTRAVENOUS at 01:21

## 2020-02-03 RX ADMIN — SODIUM CHLORIDE, SODIUM LACTATE, POTASSIUM CHLORIDE, AND CALCIUM CHLORIDE 125 ML/HR: 600; 310; 30; 20 INJECTION, SOLUTION INTRAVENOUS at 09:57

## 2020-02-03 RX ADMIN — ACETAMINOPHEN 650 MG: 325 TABLET ORAL at 14:40

## 2020-02-03 RX ADMIN — Medication 10 ML: at 14:00

## 2020-02-03 NOTE — ED TRIAGE NOTES
Pt here for lightheadedness, generalized weakness, and nausea starting last night. Denies fevers. Surgery Thursday for triple hernia repair with Dr. Daniel William. Pt is pale.

## 2020-02-03 NOTE — ED NOTES
TRANSFER - OUT REPORT:    Verbal report given to Vickie Yoon RN(name) on [employer]  being transferred to 5th floor(unit) for routine progression of care       Report consisted of patients Situation, Background, Assessment and   Recommendations(SBAR). Information from the following report(s) SBAR, ED Summary, [facility] and Recent Results was reviewed with the receiving nurse. Lines:       Opportunity for questions and clarification was provided.       Patient transported with:   [facility]

## 2020-02-03 NOTE — PROGRESS NOTES
2-3-2020 Reason for Admission:   Dehydration                   RUR Score:   16                  Plan for utilizing home health:  TBD                        Current Advanced Directive/Advance Care Plan: Full Code                         Transition of Care Plan:      Home with wife    I met with the patient to determine potential discharge needs. He lives with his wife, Bora Christianson ([phone]), in a 2-story house with a first floor master bedroom and bath. He is independent with his ADL's, uses no assistive devices and drives. His PCP is Dr. Issac Villatoro. He has prescription drug coverage and uses [facility] on [address]. Observation status was explained to him, he signed the Medicare and "" MIRAGE, was given copies and the originals wer placed on his chart. He is not anticipating any discharge needs and his wife will transport him home.     Care Management Interventions  PCP Verified by CM: Yes(Dr. Issac Villatoro)  Mode of Transport at Discharge: (Wife)  Discharge Durable Medical Equipment: No  Physical Therapy Consult: No  Occupational Therapy Consult: No  Speech Therapy Consult: No  Current Support Network: Lives with Spouse, Own Home  Confirm Follow Up Transport: Family  The Plan for Transition of Care is Related to the Following Treatment Goals : Dehydration  Discharge Location  Discharge Placement: (Home)    ERAN Quesada, CM

## 2020-02-03 NOTE — ED PROVIDER NOTES
72-year-old white male presents to the emergency department with dehydration, fatigue, not feeling well. Patient reports he had hernia surgery 3 days ago. Since then he has not been eating and drinking well. He has felt dehydrated. He has not had a lot of energy. Patient has not had fevers. He has had mid abdominal pain. Pain is about 5 out of 10 and seems appropriate with his surgical healing. He has been constipated. He has also been having some intermittent bowel movements. He feels dizzy if he stands. Patient denies chest pain or shortness of breath. He denies headaches or neck pain. He has not felt like eating or drinking much the last several days.            Past Medical History:   Diagnosis Date    Anticoagulated on Coumadin 11/27/2017    Arthritis     Atrial fibrillation (HCC)     PAF    Cardiomyopathy ([address]     GI bleed 02/2017    HTN (hypertension)     Hypothyroidism     [facility]     Orthostatic hypotension 11/29/2017    Pacemaker 11/28/2017    Medtronic BiV ICD    S/P AVR (aortic valve replacement)     mechanical AVR    Syncope 8813    Systolic heart failure ([facility]     TIA (transient ischemic attack) 2017       Past Surgical History:   Procedure Laterality Date    HX AORTIC VALVE REPLACEMENT  2003    mechanical aortic valve     HX KNEE ARTHROSCOPY Right 1980's    HX MOHS PROCEDURES Left 2005    Cheek and Yazdanism    HX PACEMAKER  2011    with defibulator    HX TONSILLECTOMY  1952         Family History:   Problem Relation Age of Onset    Cancer Father        Social History     Socioeconomic History    Marital status:      Spouse name: Not on file    Number of children: Not on file    Years of education: Not on file    Highest education level: Not on file   Occupational History    Not on file   Social Needs    Financial resource strain: Not on file    Food insecurity:     Worry: Not on file     Inability: Not on file    Transportation needs: Medical: Not on file     Non-medical: Not on file   Tobacco Use    Smoking status: Former Smoker    Smokeless tobacco: Never Used   Substance and Sexual Activity    Alcohol use: Not Currently    Drug use: No    Sexual activity: Yes   Lifestyle    Physical activity:     Days per week: Not on file     Minutes per session: Not on file    Stress: Not on file   Relationships    Social connections:     Talks on phone: Not on file     Gets together: Not on file     Attends Denominational service: Not on file     Active member of club or organization: Not on file     Attends meetings of clubs or organizations: Not on file     Relationship status: Not on file    Intimate partner violence:     Fear of current or ex partner: Not on file     Emotionally abused: Not on file     Physically abused: Not on file     Forced sexual activity: Not on file   Other Topics Concern    Not on file   Social History Narrative    Not on file         ALLERGIES: Patient has no known allergies. Review of Systems   Constitutional: Positive for fatigue. Negative for fever. HENT: Negative for congestion. Eyes: Negative for pain. Respiratory: Negative for cough and shortness of breath. Cardiovascular: Negative for chest pain. Gastrointestinal: Positive for abdominal pain. Endocrine: Negative for polyuria. Genitourinary: Negative for flank pain. Musculoskeletal: Negative for neck pain. Skin: Negative for color change. Allergic/Immunologic: Negative for immunocompromised state. Neurological: Positive for dizziness and weakness. Negative for headaches. Hematological: Does not bruise/bleed easily. Psychiatric/Behavioral: Negative for confusion. All other systems reviewed and are negative. Vitals:    02/02/20 1936   BP: (!) 69/41   Pulse: 78   Resp: 14   Temp: 97.9 °F (36.6 °C)   SpO2: 100%   Weight: 86.6 kg (191 lb)   Height: 6' (1.829 m)            Physical Exam  Vitals signs and nursing note reviewed. Constitutional:       General: He is not in acute distress. Appearance: He is well-developed. He is not diaphoretic. Comments: Patient is awake and alert, he appears pale and fatigued   HENT:      Head: Normocephalic and atraumatic. Right Ear: External ear normal.      Left Ear: External ear normal.      Mouth/Throat:      Comments: Mucous membranes are dry  Eyes:      Pupils: Pupils are equal, round, and reactive to light. Neck:      Musculoskeletal: Normal range of motion and neck supple. Vascular: No JVD. Trachea: No tracheal deviation. Cardiovascular:      Rate and Rhythm: Normal rate and regular rhythm. Heart sounds: Normal heart sounds. No murmur. No friction rub. No gallop. Pulmonary:      Effort: Pulmonary effort is normal. No respiratory distress. Breath sounds: Normal breath sounds. No stridor. No wheezing or rales. Abdominal:      General: Bowel sounds are normal. There is no distension. Palpations: Abdomen is soft. Tenderness: There is no abdominal tenderness. There is no guarding or rebound. Comments: He has healing surgical scars in the mid abdomen, without drainage. Mild lower abdominal tenderness to palpation, no rebound or guarding   Musculoskeletal: Normal range of motion. General: No tenderness. Comments: No edema in the legs, good pulses in all 4 extremities   Skin:     General: Skin is warm and dry. Findings: No erythema or rash. Neurological:      Mental Status: He is alert and oriented to person, place, and time. Cranial Nerves: No cranial nerve deficit. Coordination: Coordination normal.      Deep Tendon Reflexes: Reflexes are normal and symmetric. Psychiatric:         Behavior: Behavior normal.         Thought Content:  Thought content normal.         Judgment: Judgment normal.          MDM  Number of Diagnoses or Management Options  Abdominal pain, generalized:   Dehydration:   Hypotension, unspecified hypotension type:   Lactic acidosis:   Diagnosis management comments: Patient appears to be fatigued. He looks dehydrated. His initial blood pressure was low. Will check lactate, blood cultures, basic blood work. Will check chest x-ray and CT scan of the abdomen. Will give IV fluids. Will monitor closely. Amount and/or Complexity of Data Reviewed  Clinical lab tests: ordered and reviewed  Tests in the radiology section of CPT®: ordered and reviewed  Tests in the medicine section of CPT®: ordered and reviewed  Discussion of test results with the performing providers: yes  Decide to obtain previous medical records or to obtain history from someone other than the patient: yes  Obtain history from someone other than the patient: yes  Review and summarize past medical records: yes  Discuss the patient with other providers: yes  Independent visualization of images, tracings, or specimens: yes    Risk of Complications, Morbidity, and/or Mortality  Presenting problems: high  Diagnostic procedures: high  Management options: high    Critical Care  Total time providing critical care: 30-74 minutes (Total critical care time spent exclusive of procedures:  30 min)         Procedures   Labs reviewed  White blood cell count is 14    CT scan shows stranding in the lower abdomen with possible fluid collections. Difficult to tell if these are hematomas versus abscess. Abscess is less likely given the time course of surgery 3 days ago. Consult  8:40 PM  I spoke to Dr. Bubba Jolley from general surgery. She will admit the patient. She asked that I hold off on antibiotics at this time.   She will observe patient and give IV fluids overnight    9:02 PM  BP is up to 734 systolic w/ IVF  He's feeling much better  Admission for IVF and monitoring and recheck H and H in AM

## 2020-02-03 NOTE — PHYSICIAN ADVISORY
Letter of Status Determination:  
Recommend hospitalization status upgraded from OBSERVATION  to INPATIENT  Status Pt Name:  Joy Bass MR#  
PUNEET # [id number] / 
[id number] Payor: Hesham Ivan / Plan: Octaviano Hu / Product Type: Managed Care Medicare /   
KUSUM#  [id number] Room and Hospital  536/01  @ [facility] Hospitalization date  2/2/2020  7:42 PM  
Current Attending Physician  Nixon Rivas MD  
Principal diagnosis  sepsis Clinicals  53-year-old white male presents to the emergency department with dehydration, fatigue, not feeling well. Patient reports he had hernia surgery 3 days ago. Since then he has not been eating and drinking well. He has felt dehydrated. He has not had a lot of energy. Patient has not had fevers. He has had mid abdominal pain. Pain is about 5 out of 10 and seems appropriate with his surgical healing. He has been constipated. He has also been having some intermittent bowel movements. He feels dizzy if he stands CT scan shows stranding in the lower abdomen with possible fluid collections. Difficult to tell if these are hematomas versus abscess Elevated lactic acid, hypotensive, Hb Dropping [place] (MCG) criteria Does  NOT apply STATUS DETERMINATION  INPATIENT The final decision of the patient's hospitalization status depends on the attending physician's judgment Additional comments Payor: Hesham Ivan / Plan: Octaviano Hu / Product Type: Managed Care Medicare /   
  
 
Nisha Huertas MD 
Cell: [phone] Physician Advisor

## 2020-02-03 NOTE — PROGRESS NOTES
DR Ousmane Quinton called and message with staff that Mr Buren Libman in room 536  has been running some low blood pressure, that he is very pale and his hemoglobin is 7.1 Notified also that the patient did not tolerate out of the bed to the bedside commode well. Increase SOB and o2 2lrs placed for comfort. While I am awaiting on the doctor response, patient is on close observation.

## 2020-02-03 NOTE — PROGRESS NOTES
Nutrition Assessment:    RECOMMENDATIONS/INTERVENTION(S):   1. Advance diet as medically feasible to goal of regular. 2. Add Ensure Clear 1x/d to promote adequate po intake. 3. Continue to monitor intakes, wt changes, labs. ASSESSMENT:   2/3: Pt assessed for MST>2 for wt loss. Admitted with dehydration. PMH includes TIA, CHF, CVA, CHF. BMI 25.9, WNL for age. Per chart, pt has not been eating/drinking much since hernia surgery 4 days ago. Pt reports poor appetite and po intake x1 week. Says he ate about 50% of broth, jello, italian ice, and 7-up for lunch today. Per wife, pt isn't a big eater anyway but has been eating less for the past week. Pt says he hasn't been able to taste food as well for several months, attributes it to medication. Pt reports UBW ~198#, # in chart. Wife and pt both say they have noticed some wt loss, though not sure how much. Recorded wts show 4% wt loss x1 month which is not considered significant for time frame but noted. Pt agreeable to trying Ensure Clear while on CLD. No c/o N/V. Labs- Ca 7.4, -165. Meds- LR @125mL/h. Diet Order: Clear liquids  % Eaten:  No data found. Pertinent Medications: [x] Reviewed    Labs: [x] Reviewed    Anthropometrics: Height: 6' (182.9 cm) Weight: 86.6 kg (191 lb)    IBW (%IBW):   ( ) UBW (%UBW):   (  %)      BMI: Body mass index is 25.9 kg/m². This BMI is indicative of:   [] Underweight    [x] Normal    [] Overweight    []  Obesity    []  Extreme Obesity (BMI>40)  Estimated Nutrition Needs (Based on): 2151 Kcals/day(1654 x 1.3 AF) , 86 g(0.8-1 g/kg) Protein  Carbohydrate: At Least 130 g/day  Fluids: 2151 mL/day (1 ml/kcal)    Last BM: 2/2   [x]Active     []Hyperactive  []Hypoactive       [] Absent   BS  Skin:    [x] Intact   [] Incision  [] Breakdown   [] DTI   [] Tears/Excoriation/Abrasion  []Edema [] Other:      Wt Readings from Last 30 Encounters:   02/02/20 86.6 kg (191 lb)   01/30/20 86.6 kg (191 lb)   01/02/20 90.4 kg (199 lb 6 oz)   12/12/19 119.3 kg (263 lb)   12/13/17 88.5 kg (195 lb)   12/04/17 91.2 kg (201 lb)   12/01/17 88.2 kg (194 lb 7.1 oz)      NUTRITION DIAGNOSES:   Problem:  Inadequate oral intake     Etiology: related to decreased ability to consume sufficient po intake     Signs/Symptoms: as evidenced by pt reports poor appetite and po intake x1 week, currently on CLD      NUTRITION INTERVENTIONS:  Meals/Snacks: General/healthful diet   Supplements: Commercial supplement              GOAL:   diet advancement with po intake >50% meals + ONS next 1-3 days    Cultural, Christian, or Ethnic Dietary Needs: None     EDUCATION & DISCHARGE NEEDS:    [x] None Identified   [] Identified and Education Provided/Documented   [] Identified and Pt declined/was not appropriate      [] Interdisciplinary Care Plan Reviewed/Documented    [x] Discharge Needs: Regular diet   [] No Nutrition Related Discharge Needs    NUTRITION RISK:   Pt Is At Nutrition Risk  [x]     No Nutrition Risk Identified  []       PT SEEN FOR:    []  MD Consult: []Calorie Count      []Diabetic Diet Education        []Diet Education     []Electrolyte Management     []General Nutrition Management and Supplements     []Management of Tube Feeding     []TPN Recommendations    [x]  RN Referral:  [x]MST score >=2     []Enteral/Parenteral Nutrition PTA     []Pregnant: Gestational DM or Multigestation                 [] Pressure Ulcer    []  Low BMI      []  Length of Stay       [] Dysphagia Diet         [] Ventilator  []  Follow-up     Previous Recommendations:   [] Implemented          [] Not Implemented          [x] Not Applicable    Previous Goal:   [] Met              [] Progressing Towards Goal              [] Not Progressing Towards Goal   [x] Not Applicable            [facility], [facility]  Pager [phone]  Phone [phone]

## 2020-02-03 NOTE — ROUTINE PROCESS
Bedside shift change report given to WENDIE Long (oncoming nurse) by Araseli Burnett RN (offgoing nurse). Report included the following information SBAR, Kardex and MAR.

## 2020-02-04 ENCOUNTER — APPOINTMENT (OUTPATIENT)
Dept: NON INVASIVE DIAGNOSTICS | Age: 75
DRG: 811 | End: 2020-02-04
Attending: FAMILY MEDICINE
Payer: MEDICARE

## 2020-02-04 ENCOUNTER — APPOINTMENT (OUTPATIENT)
Dept: GENERAL RADIOLOGY | Age: 75
DRG: 811 | End: 2020-02-04
Attending: SURGERY
Payer: MEDICARE

## 2020-02-04 LAB
ANION GAP SERPL CALC-SCNC: 7 MMOL/L (ref 5–15)
AV VELOCITY RATIO: 0.52
AV VTI RATIO: 0.5
BASOPHILS # BLD: 0 K/UL (ref 0–0.1)
BASOPHILS NFR BLD: 0 % (ref 0–1)
BUN SERPL-MCNC: 38 MG/DL (ref 6–20)
BUN/CREAT SERPL: 26 (ref 12–20)
CALCIUM SERPL-MCNC: 8 MG/DL (ref 8.5–10.1)
CHLORIDE SERPL-SCNC: 110 MMOL/L (ref 97–108)
CO2 SERPL-SCNC: 23 MMOL/L (ref 21–32)
CREAT SERPL-MCNC: 1.45 MG/DL (ref 0.7–1.3)
DIFFERENTIAL METHOD BLD: ABNORMAL
ECHO AO ROOT DIAM: 4.13 CM
ECHO AV AREA PEAK VELOCITY: 1.7 CM2
ECHO AV AREA VTI: 1.5 CM2
ECHO AV AREA/BSA PEAK VELOCITY: 0.8 CM2/M2
ECHO AV AREA/BSA VTI: 0.7 CM2/M2
ECHO AV MEAN GRADIENT: 8.1 MMHG
ECHO AV MEAN VELOCITY: 1.36 M/S
ECHO AV PEAK GRADIENT: 13.9 MMHG
ECHO AV PEAK VELOCITY: 186.63 CM/S
ECHO AV VTI: 29.22 CM
ECHO LA MAJOR AXIS: 4.82 CM
ECHO LA TO AORTIC ROOT RATIO: 1.17
ECHO LA VOL 2C: 26.69 ML (ref 18–58)
ECHO LA VOL 4C: 70.73 ML (ref 18–58)
ECHO LA VOL BP: 51.92 ML (ref 18–58)
ECHO LA VOL/BSA BIPLANE: 23.87 ML/M2 (ref 16–28)
ECHO LA VOLUME INDEX A2C: 12.27 ML/M2 (ref 16–28)
ECHO LA VOLUME INDEX A4C: 32.51 ML/M2 (ref 16–28)
ECHO LV EDV A2C: 132.4 ML
ECHO LV EDV A4C: 160.2 ML
ECHO LV EDV BP: 147.4 ML (ref 67–155)
ECHO LV EDV INDEX A4C: 73.6 ML/M2
ECHO LV EDV INDEX BP: 67.8 ML/M2
ECHO LV EDV NDEX A2C: 60.9 ML/M2
ECHO LV EDV TEICHHOLZ: 82.92 ML
ECHO LV EJECTION FRACTION A2C: 40 %
ECHO LV EJECTION FRACTION A4C: 49 %
ECHO LV EJECTION FRACTION BIPLANE: 45.5 % (ref 55–100)
ECHO LV ESV A2C: 78.8 ML
ECHO LV ESV A4C: 82.4 ML
ECHO LV ESV BP: 80.3 ML (ref 22–58)
ECHO LV ESV INDEX A2C: 36.2 ML/M2
ECHO LV ESV INDEX A4C: 37.9 ML/M2
ECHO LV ESV INDEX BP: 36.9 ML/M2
ECHO LV ESV TEICHHOLZ: 28.56 ML
ECHO LV INTERNAL DIMENSION DIASTOLIC: 6.04 CM (ref 4.2–5.9)
ECHO LV INTERNAL DIMENSION SYSTOLIC: 3.82 CM
ECHO LV IVSD: 1.4 CM (ref 0.6–1)
ECHO LV MASS 2D: 380.7 G (ref 88–224)
ECHO LV MASS INDEX 2D: 143.7 G/M2 (ref 49–115)
ECHO LV POSTERIOR WALL DIASTOLIC: 0.99 CM (ref 0.6–1)
ECHO LVOT DIAM: 2.06 CM
ECHO LVOT PEAK GRADIENT: 3.7 MMHG
ECHO LVOT PEAK VELOCITY: 96.46 CM/S
ECHO LVOT SV: 45.3 ML
ECHO LVOT VTI: 13.6 CM
ECHO MV A VELOCITY: 50.97 CM/S
ECHO MV E DECELERATION TIME (DT): 113.4 MS
ECHO MV E VELOCITY: 95.39 CM/S
ECHO MV E/A RATIO: 1.9
ECHO MV REGURGITANT PEAK GRADIENT: 107.9 MMHG
ECHO MV REGURGITANT PEAK VELOCITY: 519.41 CM/S
ECHO PV MAX VELOCITY: 58.74 CM/S
ECHO PV PEAK GRADIENT: 1.4 MMHG
ECHO TV REGURGITANT MAX VELOCITY: 248.29 CM/S
ECHO TV REGURGITANT PEAK GRADIENT: 24.7 MMHG
EOSINOPHIL # BLD: 0 K/UL (ref 0–0.4)
EOSINOPHIL NFR BLD: 0 % (ref 0–7)
ERYTHROCYTE [DISTWIDTH] IN BLOOD BY AUTOMATED COUNT: 15.9 % (ref 11.5–14.5)
GLUCOSE SERPL-MCNC: 131 MG/DL (ref 65–100)
HCT VFR BLD AUTO: 17.8 % (ref 36.6–50.3)
HGB BLD-MCNC: 5.9 G/DL (ref 12.1–17)
IMM GRANULOCYTES # BLD AUTO: 0.1 K/UL (ref 0–0.04)
IMM GRANULOCYTES NFR BLD AUTO: 1 % (ref 0–0.5)
LVFS 2D: 36.66 %
LVOT MG: 2.15 MMHG
LVOT MV: 0.68 CM/S
LVSV (MOD BI): 30.51 ML
LVSV (MOD SINGLE 4C): 35.36 ML
LVSV (MOD SINGLE): 24.33 ML
LVSV (TEICH): 54.36 ML
LYMPHOCYTES # BLD: 1.4 K/UL (ref 0.8–3.5)
LYMPHOCYTES NFR BLD: 12 % (ref 12–49)
MCH RBC QN AUTO: 30.1 PG (ref 26–34)
MCHC RBC AUTO-ENTMCNC: 33.1 G/DL (ref 30–36.5)
MCV RBC AUTO: 90.8 FL (ref 80–99)
MONOCYTES # BLD: 1 K/UL (ref 0–1)
MONOCYTES NFR BLD: 9 % (ref 5–13)
MV DEC SLOPE: 8.41
NEUTS SEG # BLD: 9 K/UL (ref 1.8–8)
NEUTS SEG NFR BLD: 78 % (ref 32–75)
NRBC # BLD: 0 K/UL (ref 0–0.01)
NRBC BLD-RTO: 0 PER 100 WBC
PLATELET # BLD AUTO: 213 K/UL (ref 150–400)
PMV BLD AUTO: 10 FL (ref 8.9–12.9)
POTASSIUM SERPL-SCNC: 4.5 MMOL/L (ref 3.5–5.1)
PV END DIASTOLIC VELOCITY: 0.8 MMHG
RBC # BLD AUTO: 1.96 M/UL (ref 4.1–5.7)
RBC MORPH BLD: ABNORMAL
SODIUM SERPL-SCNC: 140 MMOL/L (ref 136–145)
WBC # BLD AUTO: 11.5 K/UL (ref 4.1–11.1)

## 2020-02-04 PROCEDURE — P9016 RBC LEUKOCYTES REDUCED: HCPCS

## 2020-02-04 PROCEDURE — 71045 X-RAY EXAM CHEST 1 VIEW: CPT

## 2020-02-04 PROCEDURE — 51798 US URINE CAPACITY MEASURE: CPT

## 2020-02-04 PROCEDURE — 36415 COLL VENOUS BLD VENIPUNCTURE: CPT

## 2020-02-04 PROCEDURE — 36430 TRANSFUSION BLD/BLD COMPNT: CPT

## 2020-02-04 PROCEDURE — 65270000029 HC RM PRIVATE

## 2020-02-04 PROCEDURE — 85025 COMPLETE CBC W/AUTO DIFF WBC: CPT

## 2020-02-04 PROCEDURE — 74011250636 HC RX REV CODE- 250/636: Performed by: NURSE PRACTITIONER

## 2020-02-04 PROCEDURE — 93306 TTE W/DOPPLER COMPLETE: CPT

## 2020-02-04 PROCEDURE — 80048 BASIC METABOLIC PNL TOTAL CA: CPT

## 2020-02-04 RX ORDER — SODIUM CHLORIDE 9 MG/ML
250 INJECTION, SOLUTION INTRAVENOUS AS NEEDED
Status: DISCONTINUED | OUTPATIENT
Start: 2020-02-04 | End: 2020-02-10 | Stop reason: HOSPADM

## 2020-02-04 RX ORDER — FUROSEMIDE 10 MG/ML
40 INJECTION INTRAMUSCULAR; INTRAVENOUS ONCE
Status: COMPLETED | OUTPATIENT
Start: 2020-02-04 | End: 2020-02-04

## 2020-02-04 RX ORDER — DIPHENHYDRAMINE HYDROCHLORIDE 50 MG/ML
25 INJECTION, SOLUTION INTRAMUSCULAR; INTRAVENOUS
Status: DISCONTINUED | OUTPATIENT
Start: 2020-02-04 | End: 2020-02-06

## 2020-02-04 RX ADMIN — Medication 10 ML: at 21:20

## 2020-02-04 RX ADMIN — Medication 10 ML: at 13:27

## 2020-02-04 RX ADMIN — FUROSEMIDE 40 MG: 10 INJECTION, SOLUTION INTRAMUSCULAR; INTRAVENOUS at 16:01

## 2020-02-04 NOTE — PROGRESS NOTES
Bedside and Verbal shift change report given to  Luz Hennessy (oncoming nurse) by   Amira Ford (offgoing nurse). Report included the following information SBAR, Kardex, Intake/Output, MAR, Accordion, Recent Results and Med Rec Status.

## 2020-02-04 NOTE — PROGRESS NOTES
Spiritual Care Assessment/Progress Note  [address]      NAME: Mariann Cooper      MRN: [id number]  AGE: 69 y.o.  SEX: male  Adventism Affiliation: Roman Catholic   Language: English     2/4/2020     Total Time (in minutes): 5     Spiritual Assessment begun in [facility]  MED SURG 2 through conversation with:         [x]Patient        [] Family    [] Friend(s)        Reason for Consult: [facility]     Spiritual beliefs: (Please include comment if needed)     [x] Identifies with a coco tradition:  Roman Catholic       [x] Supported by a coco community:  Jarrod Patel     [] Claims no spiritual orientation:           [] Seeking spiritual identity:                [] Adheres to an individual form of spirituality:           [] Not able to assess:                           Identified resources for coping:      [x] Prayer                               [x] Music                  [] Guided Imagery     [x] Family/friends                 [] Pet visits     [] Devotional reading                         [] Unknown     [] Other:                                             Interventions offered during this visit: (See comments for more details)    Patient Interventions: Affirmation of coco, Communion (Roman Catholic), Initial/Spiritual assessment, patient floor, Prayer (actual), Prayer (assurance of)           Plan of Care:     [x] Support spiritual and/or cultural needs    [] Support AMD and/or advance care planning process      [] Support grieving process   [] Coordinate Rites and/or Rituals    [] Coordination with community clergy   [] No spiritual needs identified at this time   [] Detailed Plan of Care below (See Comments)  [] Make referral to Music Therapy  [] Make referral to Pet Therapy     [] Make referral to Addiction services  [] Make referral to [facility]  [] Make referral to Spiritual Care Partner  [] No future visits requested        [] Follow up visits as needed     Comments: Mr. Janis Perez was visited by the Eucharistic  today and received communion. He just finished a test prior to chaplains visit. His nurse requested communion so the three of us prayed together for . Erma Burks and the staff. Reminded him of Mass tomorrow on ch[address] 86 at noon.     JADA Lara, RN, ACSW, LCSW   Page:  [phone]

## 2020-02-04 NOTE — PROGRESS NOTES
Rounded on Religion patients and provided Anointing of the Sick at request of patient    FrMayi Bullard

## 2020-02-04 NOTE — PROGRESS NOTES
Received 1U PRBC overnight. Feels better. Mildly short of breath at bedside. Second unit going. Only sore when he coughs. BM loose. Hb 5.9 (from 7.1). Suspect some dilution after fluids given yesterday. He weight 197 on admission, 210 this morning. Abdomen unremarkable. Some upper/lower extremity edema. Continue transfusion. Cardiology consulted for cardiac history and fluid management. May need gentle diuresis. Ordered CXR, INR today. Recheck CBC @ 1600 today.     Ping London MD

## 2020-02-04 NOTE — H&P
[address]  HISTORY AND PHYSICAL    Name:  Bryanna Sweeney  MR#:  [id number]  :  1945  ACCOUNT #:  [de-identified]  ADMIT DATE:  2020    PRIMARY CARE PROVIDER:  Festus Tarango MD    This is history and physical for dehydration. HISTORY OF PRESENT ILLNESS:  Mr. Alfred Gardner is a pleasant 57-year-old male with a known history of thoracic aortic aneurysm, atrial fibrillation, and a history of GI bleed in 2017 and mechanical aortic valve. The patient is on chronic anticoagulation for his life. He recently underwent a robotic-assisted bilateral inguinal hernia repair and upper abdominal incisional hernia repair on 2019. Postoperatively, he did well. The patient restarted his Lovenox on recommendation by his cardiologist within 12 hours after surgery. Over the weekend, he became very weak and lightheaded. He came to the emergency department on  in the morning. His hemoglobin, on admission, was 7.1, lactic acid was 2.8. CT of the abdomen and pelvis without IV contrast revealed abundant hypodense fluid collection through locations of the lower pelvis including anterior perivesicular space. There were also fluid collections in the perihepatic and perisplenic spaces. This was suggestive of hematoma versus hyperdense seroma following surgery. Abscess is less likely given the fact that he is only 3 days out from surgery. Images and interpreting radiology report were reviewed by myself. In the emergency department,  Dr. Bernardino Joyce gave him IV fluids and he felt better. The patient did have a starting blood pressure of 69/41 in the emergency department. INR 2.9. Systolic improved to 628 after 500 mL fluid bolus. He was admitted to the hospital for dehydration and possible surgical blood loss anemia following inguinal hernia surgery. PAST MEDICAL HISTORY:  See HPI. 1.  Prostate cancer. 2.  Chronic anticoagulation. 3.  Hypertension. 4.  Orthostatic hypotension.   5. Medtronic [place] ICD pacemaker placed 2017. 6.  History of stroke. 7.  History of TIA. 8.  History of systolic heart failure. PAST SURGICAL HISTORY:  See HPI. PAST FAMILY HISTORY:  None related to current examination. PAST SOCIAL HISTORY:  He is . He is a never smoker. He drinks alcohol occasionally. Children are healthy. REVIEW OF SYSTEMS:  See HPI. Negative HEENT, eyes, gastrointestinal, and neurologic. Remainder of the review is negative. MEDICATIONS OUTPATIENT:  1. Bisoprolol. 2.  Torsemide. 3.  Pravastatin. 4.  Warfarin. 5.  Tylenol. 6.  Lovenox. ALLERGIES:  NO KNOWN DRUG ALLERGIES. PHYSICAL EXAMINATION:  VITAL SIGNS:  At 0937, temperature was 97.1, heart rate 78, blood pressure 102/52, he is satting 100% on room air. CONSTITUTIONAL:  He is a well-appearing male, in no apparent distress. HEENT:  Normocephalic, atraumatic. NECK:  Supple. Trachea is midline. LUNGS:  Chest is clear. No labored breathing. HEART:  Rate is regular. No murmurs or rubs. ABDOMEN:  Soft. Incisions are clean and appropriate with dressings on them. He does not have fluid wave. MUSCULOSKELETAL:  No clubbing, cyanosis, or edema. SKIN:  Clear, albeit slightly pale likely from anemia. NEUROLOGIC:  Grossly nonfocal.  PSYCHIATRIC:  Appropriate to questioning and pleasant. RADIOLOGY:  See HPI. CT scan of the abdomen and pelvis was done without IV contrast.    LABORATORY DATA:  See HPI. ASSESSMENT:  1.  Dehydration. 2.  Acute blood loss anemia, likely from combination of recent surgery and known history of needing chronic anticoagulation and current INR 2.9. PLAN:  Admit to [facility]. Continue IV fluid resuscitation. We will order two units of packed red blood cells for the patient. We would like to keep his hemoglobin above 8 given his history of known TIA and aortic valve replacement in the past.  We will hold anticoagulation therapy.   Clearly he was likely supratherapeutic through the weekend; being on additional subcutaneous lovenox anticoagulation may have contributed to blood loss. He may need repeat CT scan approximately in 24-48 hours depending upon his response to the fluid resuscitation and blood. We will also consult Cardiology here at [facility] for help in management of anticoagulation and fluid status. .  The patient is being followed at [address] [place] [address] for his valve and anticoagulation.        Jessie Torres MD      [id number]  D:  02/04/2020 11:45  T:  02/04/2020 17:49  JOB #:  [id number]  CC:  MD Ede Lima MD

## 2020-02-04 NOTE — CONSULTS
Patient: Jaime Arango  : 1945    Today's Date: 2020    HISTORY OF PRESENT ILLNESS:     History of Present Illness:    Jaime Arango is a 69 y.o. male presenting for dehydration, fatigue after recent bilateral inguinal hernia repair on 2020. Pt came to ED on 2020 complaining of fatigue, decreased PO intake postop, and generalized weakness. Pt found to be hypotensive and anemic. He was admitted and received 4L IVF. On 2/3/2020 pt continued to have low BPs and Hgb decreased to 7.1. He received 1U PRBCs but this morning Hgb even lower at 5.9. He has received 2 additional units PRBCs today (total 3 units PRBCs). Gen Surg suspect dilutional anemia given fluids. Pt's weight has increased by 13 lbs in 2 days per chart review. Pt w extensive cardiac hx, cardiology consulted to assist in fluid management and possible diuresis. Pt with hx of HFrEF, HTN, pAfib, aortic valve repair, implanted pacemaker/defibrillator. Followed by Dr. Norma Davidson [facility] q3 mos, Dr. Penny Dye [facility] cardiology) for his pacemaker and Dr. Magdiel Pereira [facility] cardiology) for his mechanical valve. INR/coumadin monitored by [facility]. Reports compliance w his home medications. Currently c/o mild SOB (which he states improved from admission). Denies CP, dizziness/lightheadedness, orthopnea, or LE edema. Denies hx of MI, syncope. Has a dry cough he is attributing to a cold x 1 week. No fevers. Cough is mostly dry but has been wheezing x 1 week. Has chronic nasal congestion. Denies hx of asthma or COPD. Coumadin was stopped 6 days prior to surgery. He self-administered lovenox BID x 3 days prior to surgery. He stopped ASA 7 days prior to surgery and resumed 1 day postop. Restarted his coumadin after the procedure (5mg that evening, then 2.5 qhs thereafter). Home INR was therapeutic. Has not noticed any blood in stool.        PAST MEDICAL HISTORY:     Past Medical History:   Diagnosis Date    Anticoagulated on Coumadin 11/27/2017    Arthritis     Atrial fibrillation (HCC)     PAF    [facility]     GI bleed 02/2017    HTN (hypertension)     Hypothyroidism     Melanoma in situ ([facility]     Orthostatic hypotension 11/29/2017    Pacemaker 11/28/2017    Medtronic BiV ICD    S/P AVR (aortic valve replacement)     mechanical AVR    Syncope 0157    Systolic heart failure ([facility]     TIA (transient ischemic attack) 2017       Past Surgical History:   Procedure Laterality Date    HX AORTIC VALVE REPLACEMENT  2003    mechanical aortic valve     HX KNEE ARTHROSCOPY Right 1980's    HX MOHS PROCEDURES Left 2005    Cheek and [place]    HX PACEMAKER  2011    with defibulator    HX TONSILLECTOMY  1952         MEDICATIONS:     Current Facility-Administered Medications   Medication Dose Route Frequency Provider Last Rate Last Dose    0.9% sodium chloride infusion 250 mL  250 mL IntraVENous PRN Jorge Gregg MD        diphenhydrAMINE (BENADRYL) injection 25 mg  25 mg IntraVENous Q6H PRN Gwen Ortega MD        0.9% sodium chloride infusion 250 mL  250 mL IntraVENous PRN Gwen Ortega MD        sodium chloride (NS) flush 5-40 mL  5-40 mL IntraVENous Q8H Almita Simons MD   10 mL at 02/04/20 1327    sodium chloride (NS) flush 5-40 mL  5-40 mL IntraVENous PRN Doris Chan MD        lactated Ringers infusion  125 mL/hr IntraVENous CONTINUOUS Doris Chan  mL/hr at 02/03/20 0957 125 mL/hr at 02/03/20 0957    acetaminophen (TYLENOL) tablet 650 mg  650 mg Oral Q6H PRN Doris Chan MD   650 mg at 02/03/20 1440    oxyCODONE IR (ROXICODONE) tablet 5 mg  5 mg Oral Q4H PRN Doris Chan MD        oxyCODONE IR (ROXICODONE) tablet 10 mg  10 mg Oral Q4H PRN Doris Chan MD        HYDROmorphone (DILAUDID) syringe 0.5 mg  0.5 mg IntraVENous Q4H PRN Doris Chan MD        ondansetron [facility]) injection 4 mg  4 mg IntraVENous Q4H PRN Doris Chan MD         No Known Allergies    SOCIAL HISTORY: Social History     Tobacco Use    Smoking status: Former Smoker    Smokeless tobacco: Never Used   Substance Use Topics    Alcohol use: Not Currently    Drug use: No       FAMILY HISTORY:     Family History   Problem Relation Age of Onset    Cancer Father        REVIEW OF SYMPTOMS:     Review of Symptoms:  Constitutional: Negative for fever, chills  HEENT: Negative for nosebleeds, tinnitus, and vision changes. Respiratory: Negative for cough, wheezing  Cardiovascular: Negative for orthopnea, claudication, syncope, and PND. Gastrointestinal: Negative for abdominal pain, diarrhea, melena. Genitourinary: Negative for dysuria  Musculoskeletal: Negative for myalgias. Skin: Negative for rash  Heme: No problems bleeding. Neurological: Negative for speech change and focal weakness. PHYSICAL EXAM:     Physical Exam:  Visit Vitals  /69   Pulse 76   Temp 98.6 °F (37 °C)   Resp 20   Ht 6' (1.829 m)   Wt 210 lb (95.3 kg)   SpO2 100%   BMI 28.48 kg/m²     Patient appears generally well, mood and affect are appropriate and pleasant. HEENT:  Hearing intact, non-icteric, normocephalic, atraumatic. Neck Exam: Supple, No JVD or carotid bruits. Lung Exam: Clear to auscultation, even breath sounds. No inspiratory crackles, but does have faint bibasilar wheezing. Good air movement. No increased WOB at rest.  Cardiac Exam: Regular rate and rhythm with no murmur  Abdomen: Soft, non-tender, normal bowel sounds. No bruits or masses. Well healing laparoscopic incisions, some bruising from lovenox injections. Extremities: Moves all ext well. No lower extremity edema. Vascular: 2+ dorsalis pedis pulses bilaterally.   Psych: Appropriate affect  Neuro - Grossly intact      LABS / OTHER STUDIES:     Recent Results (from the past 24 hour(s))   CBC WITH AUTOMATED DIFF    Collection Time: 02/04/20  4:20 AM   Result Value Ref Range    WBC 11.5 (H) 4.1 - 11.1 K/uL    RBC 1.96 (L) 4.10 - 5.70 M/uL    HGB 5.9 (LL) 12.1 - 17.0 g/dL    HCT 17.8 (LL) 36.6 - 50.3 %    MCV 90.8 80.0 - 99.0 FL    MCH 30.1 26.0 - 34.0 PG    MCHC 33.1 30.0 - 36.5 g/dL    RDW 15.9 (H) 11.5 - 14.5 %    PLATELET 665 470 - 090 K/uL    MPV 10.0 8.9 - 12.9 FL    NRBC 0.0 0  WBC    ABSOLUTE NRBC 0.00 0.00 - 0.01 K/uL    NEUTROPHILS 78 (H) 32 - 75 %    LYMPHOCYTES 12 12 - 49 %    MONOCYTES 9 5 - 13 %    EOSINOPHILS 0 0 - 7 %    BASOPHILS 0 0 - 1 %    IMMATURE GRANULOCYTES 1 (H) 0.0 - 0.5 %    ABS. NEUTROPHILS 9.0 (H) 1.8 - 8.0 K/UL    ABS. LYMPHOCYTES 1.4 0.8 - 3.5 K/UL    ABS. MONOCYTES 1.0 0.0 - 1.0 K/UL    ABS. EOSINOPHILS 0.0 0.0 - 0.4 K/UL    ABS. BASOPHILS 0.0 0.0 - 0.1 K/UL    ABS. IMM. GRANS. 0.1 (H) 0.00 - 0.04 K/UL    DF SMEAR SCANNED      RBC COMMENTS NORMOCYTIC, NORMOCHROMIC     METABOLIC PANEL, BASIC    Collection Time: 02/04/20  4:20 AM   Result Value Ref Range    Sodium 140 136 - 145 mmol/L    Potassium 4.5 3.5 - 5.1 mmol/L    Chloride 110 (H) 97 - 108 mmol/L    CO2 23 21 - 32 mmol/L    Anion gap 7 5 - 15 mmol/L    Glucose 131 (H) 65 - 100 mg/dL    BUN 38 (H) 6 - 20 MG/DL    Creatinine 1.45 (H) 0.70 - 1.30 MG/DL    BUN/Creatinine ratio 26 (H) 12 - 20      GFR est AA 58 (L) >60 ml/min/1.73m2    GFR est non-AA 48 (L) >60 ml/min/1.73m2    Calcium 8.0 (L) 8.5 - 10.1 MG/DL         CARDIAC DIAGNOSTICS:     Cardiac Evaluation Includes:    EKG 11/27/17 - V paced, PVC's     ABELARDO/DCC 8/11/17 - LVEF 50-55%; Severe RV dilation and mod dysfunction. PFO with mod shunting. Normal mechanical AVR (mean gradient 4 mmHg), mod MR. DCCV 8/11/17-> NSR      ECHO 8/30/17 - TDS. Limited study.  LVEF 50-55%, Mod RV dilation with RV dysfunction, CHARLIE, RVSP 37   ECHO 11/29/2017: EF 55-60%, now WMA, mild reduced RVEF, mod LAE, mild CHARLIE, mild-mod MR, mechanical AVR- trivial AI, mild TR, RVSP 36,    AV Vmax was 1.4 m/s.  AV maxPG was 7.9 mmHg.     CT Head 11/27/17 - no acute abn   Carotid Doppler 11/28/17 - 0-49% stenosis bilat    ASSESSMENT AND PLAN:     Assessment and Plan: 1. HFrEF -Acute on chronic- up 13 lbs in 2 days per chart review -? Not accurate. Output charted only as urine x 2 occurrences. CXR today with bibasilar atelectasis and persistent pneumoperitoneum. Added strict I/Os and daily weights. Recheck ECHO, BNP, obtain [facility] records. If pro BNP significantly elevated will diurese. 2. Mechanical AVR - continue home coumadin . INR 2.9 2/2/2020    3. Paroxysmal Afib w BiV ICD - home home coumadin, INR therapeutic on admission. Paced rhythm on admission     4. HTN - home entresto 49/51 BID, spironolactone 25mg BID on hold due to hypotension overnight. Will continue to monitor. 5. S/p hernia repair with postoperative anemia - now s/p 3 units PRBCs this admission. Awaiting 2 hr post transfusion H/H.     6. HLD - on pravastatin 40mg daily    Resident: Lyubov Zeng MD      Pt personally seen and examined. Chart reviewed. Agree with Resident's history, exam and  A/P with changes/additons. Neck-no JVD  CVS-S1-S2 present, no murmur    2/6 systolic murmur present; sys click +  RS-   CTAB       Abdomen-  Mildly distended  LE-   No edema    Discussed with patient/nursing/family    Greer Couch MD, [address] [place] [address], [place] [address]  [address] [place] [address], [address] [place] [address]  Ph: [phone]   Ph [phone]

## 2020-02-04 NOTE — ADVANCED PRACTICE NURSE
EKG 11/27/17 - V paced, PVC's     ABELARDO/DCC 8/11/17 - LVEF 50-55%; Severe RV dilation and mod dysfunction. PFO with mod shunting. Normal mechanical AVR (mean gradient 4 mmHg), mod MR. DCCV 8/11/17-> NSR      ECHO 8/30/17 - TDS. Limited study. LVEF 50-55%, Mod RV dilation with RV dysfunction, CHARLIE, RVSP 37   ECHO 11/29/2017: EF 55-60%, now WMA, mild reduced RVEF, mod LAE, mild CHARLIE, mild-mod MR, mechanical AVR- trivial AI, mild TR, RVSP 36,   AV Vmax was 1.4 m/s.  AV maxPG was 7.9 mmHg. CT Head 11/27/17 - no acute abn   Carotid Doppler 11/28/17 - 0-49% stenosis bilat    [facility] records reviewed 2/4/2020: followed by Dr Anup Simental (HF clinic)   Permanent a fib  CHF   EF 30-35%    s/p mechanical AVR [place] 2004 2012 medtronic [place] MRI CRT-D bi V ICD. V pacing 91% 1/15/2020 device function nml  Chronic systolic CHF:   ECHO 16/6040: AVR nml function, Mild MR, TR, LVEF 30-35% , RV function mod reduced.      4/24/19 RHC: RA 11/8, RV 30/8, PA30/13/19, PCW 15 CI 1.48

## 2020-02-04 NOTE — ADT AUTH CERT NOTES
Gen Surg note 2/4 by Minna Vang  
 
   
Review Status Review Entered In Primary 2/4/2020 15:09  
   
Criteria Review GENERAL SURGERY NOTE Feb 4 Received 1U PRBC overnight.  Feels better. Griselda Pick short of breath at bedside.  Second unit going. Only sore when he coughs.  BM loose.   
  
Hb 5.9 (from 7.1).  Suspect some dilution after fluids given yesterday.  He weight 197 on admission, 210 this morning.  
  
Abdomen unremarkable. Some upper/lower extremity edema. 
  
Continue transfusion. Cardiology consulted for cardiac history and fluid management. May need gentle diuresis. Ordered CXR, INR today. Recheck CBC @ 1600 today. 
  
Carmel Dela Cruz MD  
Additional Notes Meds;   
LR iv @ 125/h, 2 units PRBC feb 4. LABS;   
wbc 11.5, hh 5.9/17.8 , ANC 9. CHLORIDE 110, bun 38, cr 1.45.   
   
Dehydration - Care Day  (2/3/2020) by Minna Vang  
 
   
Review Status Review Entered Completed 2/3/2020 16:38  
   
Criteria Review Care Day: 2 Care Date: 2/3/2020 Level of Care: Inpatient Floor Guideline Day 2 Clinical Status ( ) * Hemodynamic stability 2/3/2020 16:32:39 EST by Damien Carver   
  99.1   80  20  123/58,  ra. 
bp down to 100/45, 88/58, 82/56. (X) * Mental status at baseline   
(X) * Vomiting absent or controlled ( ) * Electrolyte levels normal or acceptable for outpatient follow-up ( ) * Cause of dehydration requiring inpatient treatment absent   
(X) * Renal function at baseline or improved 2/3/2020 16:38:35 EST by Damien Carver   
  crebam improved to 1.27 from 1.56.   
( ) * Discharge plans and education understood Activity ( ) * Ambulatory 2/3/2020 16:38:35 EST by Steph Maharaj not noted Routes ( ) * Oral hydration 2/3/2020 16:38:35 EST by Damien Carver   
  no po intake is noted   
(X) * Liquid or advanced diet 2/3/2020 16:38:35 EST by Steph Maharaj CLEARS   
(X) Oral or parenteral medications 2/3/2020 16:38:35 EST by Damien Mehul   
  LR iv @ 125/h,  
tylenol 650 mg po Interventions   
(X) Electrolytes 2/3/2020 16:38:35 EST by Damien Carver   
  wbc 10.2,  hh 7.1/ 22.3, ANC 8.2. chloride 111, bun 26. ca 7.4, 
* ONE UNIT PRBCs infusing. * Milestone Additional Notes  
-----DIETITIAN  
ASSESSMENT:  
2/3: Pt assessed for MST>2 for wt loss. Admitted with dehydration. PMH includes TIA, CHF, CVA, CHF. BMI 25.9, WNL for age. Per chart, pt has not been eating/drinking much since hernia surgery 4 days ago. Pt reports poor appetite and po intake x1 week. Says he ate about 50% of broth, jello, italian ice, and 7-up for lunch today. Per wife, pt isn't a big eater anyway but has been eating less for the past week. Pt says he hasn't been able to taste food as well for several months, attributes it to medication. Pt reports UBW ~198#, # in chart. Wife and pt both say they have noticed some wt loss, though not sure how much. Recorded wts show 4% wt loss x1 month which is not considered significant for time frame but noted. Pt agreeable to trying Ensure Clear while on CLD. No c/o N/V. Labs- Ca 7.4, -165. Meds- LR @125mL/h.  
   
Diet Order: Clear liquids  
  
-------(MD note for today is pending. )  
   
Physician Advisor Determination by Minna Vang  
 
   
Review Status Review Entered In Primary 2/3/2020 14:41  
   
Criteria Review Larry 3 PA note Letter of Status Determination:  
Recommend hospitalization status upgraded from  
OBSERVATION  to INPATIENT  Status 
   
Pt Name:  Hiwot Prasad MR#  
PUNEET # [id number] 
[id number] Payor: Darrell Renee / Plan: Brigid Frost / Product Type: Managed Care Medicare /   
KMV#                                                                          [id number] [facility] Hospital  [email or web address] Hospitalization date  2/2/2020  7:42 PM  
Current Attending Julien Gold MD  
 Principal diagnosis  sepsis 
   
Clinicals  44-year-old white male presents to the emergency department with dehydration, fatigue, not feeling well.  Patient reports he had hernia surgery 3 days ago.  Since then he has not been eating and drinking well. Ashleigh Torres has felt dehydrated.  He has not had a lot of energy.  Patient has not had fevers. Ashleigh Torres has had mid abdominal pain.  Pain is about 5 out of 10 and seems appropriate with his surgical healing.  He has been constipated. Ashleigh Torres has also been having some intermittent bowel movements.  He feels dizzy if he stands CT scan shows stranding in the lower abdomen with possible fluid collections.  Difficult to tell if these are hematomas versus abscess 
  
Elevated lactic acid, hypotensive, Hb Dropping  
   
[place] (MCG) criteria Does  NOT apply    
STATUS DETERMINATION  INPATIENT  
 The final decision of the patient's hospitalization status depends on the attending physician's judgment   
Additional comments     
Payor: Benjamin Salazar / Plan: Joanna Conley / Product Type: Managed Care Medicare Gearld Layer  
  
  
Alexi Whitley MD 
Physician Advisor

## 2020-02-04 NOTE — PROGRESS NOTES
Critical result called in by lab - Hgb = 5.9. This RN called Gen Surgery, [id number]. Dr. Chintan Smith returned call. Advised MD of above. Orders received for 2 units PRBC. Orders entered. Will initiate transfusion and continue to monitor patient.  ___________________________________    1450: Bedside shift change report given to [address] (oncoming nurse) by Patsy Britt (offgoing nurse). Report included the following information SBAR, Intake/Output and MAR.

## 2020-02-05 LAB
ABO + RH BLD: NORMAL
ANION GAP SERPL CALC-SCNC: 7 MMOL/L (ref 5–15)
BASOPHILS # BLD: 0 K/UL (ref 0–0.1)
BASOPHILS NFR BLD: 0 % (ref 0–1)
BLD PROD TYP BPU: NORMAL
BLOOD GROUP ANTIBODIES SERPL: NORMAL
BNP SERPL-MCNC: 5067 PG/ML
BPU ID: NORMAL
BUN SERPL-MCNC: 33 MG/DL (ref 6–20)
BUN/CREAT SERPL: 28 (ref 12–20)
CALCIUM SERPL-MCNC: 8.1 MG/DL (ref 8.5–10.1)
CHLORIDE SERPL-SCNC: 112 MMOL/L (ref 97–108)
CO2 SERPL-SCNC: 21 MMOL/L (ref 21–32)
CREAT SERPL-MCNC: 1.2 MG/DL (ref 0.7–1.3)
CROSSMATCH RESULT,%XM: NORMAL
DIFFERENTIAL METHOD BLD: ABNORMAL
EOSINOPHIL # BLD: 0 K/UL (ref 0–0.4)
EOSINOPHIL NFR BLD: 0 % (ref 0–7)
ERYTHROCYTE [DISTWIDTH] IN BLOOD BY AUTOMATED COUNT: 16.3 % (ref 11.5–14.5)
GLUCOSE SERPL-MCNC: 114 MG/DL (ref 65–100)
HCT VFR BLD AUTO: 21.7 % (ref 36.6–50.3)
HGB BLD-MCNC: 7.3 G/DL (ref 12.1–17)
IMM GRANULOCYTES # BLD AUTO: 0.1 K/UL (ref 0–0.04)
IMM GRANULOCYTES NFR BLD AUTO: 1 % (ref 0–0.5)
INR PPP: 2.5 (ref 0.9–1.1)
LYMPHOCYTES # BLD: 1.3 K/UL (ref 0.8–3.5)
LYMPHOCYTES NFR BLD: 13 % (ref 12–49)
MCH RBC QN AUTO: 29.6 PG (ref 26–34)
MCHC RBC AUTO-ENTMCNC: 33.6 G/DL (ref 30–36.5)
MCV RBC AUTO: 87.9 FL (ref 80–99)
MONOCYTES # BLD: 0.9 K/UL (ref 0–1)
MONOCYTES NFR BLD: 9 % (ref 5–13)
NEUTS SEG # BLD: 7.5 K/UL (ref 1.8–8)
NEUTS SEG NFR BLD: 77 % (ref 32–75)
NRBC # BLD: 0.05 K/UL (ref 0–0.01)
NRBC BLD-RTO: 0.5 PER 100 WBC
PLATELET # BLD AUTO: 191 K/UL (ref 150–400)
PMV BLD AUTO: 9.8 FL (ref 8.9–12.9)
POTASSIUM SERPL-SCNC: 4 MMOL/L (ref 3.5–5.1)
PROTHROMBIN TIME: 24.7 SEC (ref 9–11.1)
RBC # BLD AUTO: 2.47 M/UL (ref 4.1–5.7)
RBC MORPH BLD: ABNORMAL
SODIUM SERPL-SCNC: 140 MMOL/L (ref 136–145)
SPECIMEN EXP DATE BLD: NORMAL
STATUS OF UNIT,%ST: NORMAL
UNIT DIVISION, %UDIV: 0
WBC # BLD AUTO: 9.8 K/UL (ref 4.1–11.1)

## 2020-02-05 PROCEDURE — 85025 COMPLETE CBC W/AUTO DIFF WBC: CPT

## 2020-02-05 PROCEDURE — 80048 BASIC METABOLIC PNL TOTAL CA: CPT

## 2020-02-05 PROCEDURE — 36415 COLL VENOUS BLD VENIPUNCTURE: CPT

## 2020-02-05 PROCEDURE — 74011250637 HC RX REV CODE- 250/637: Performed by: NURSE PRACTITIONER

## 2020-02-05 PROCEDURE — 74011250636 HC RX REV CODE- 250/636: Performed by: NURSE PRACTITIONER

## 2020-02-05 PROCEDURE — 65270000029 HC RM PRIVATE

## 2020-02-05 PROCEDURE — 74011250636 HC RX REV CODE- 250/636: Performed by: SURGERY

## 2020-02-05 PROCEDURE — 83880 ASSAY OF NATRIURETIC PEPTIDE: CPT

## 2020-02-05 PROCEDURE — 77030040361 HC SLV COMPR DVT MDII -B

## 2020-02-05 PROCEDURE — 85610 PROTHROMBIN TIME: CPT

## 2020-02-05 RX ORDER — FUROSEMIDE 10 MG/ML
40 INJECTION INTRAMUSCULAR; INTRAVENOUS ONCE
Status: COMPLETED | OUTPATIENT
Start: 2020-02-05 | End: 2020-02-05

## 2020-02-05 RX ORDER — WARFARIN SODIUM 5 MG/1
5 TABLET ORAL ONCE
Status: COMPLETED | OUTPATIENT
Start: 2020-02-05 | End: 2020-02-05

## 2020-02-05 RX ADMIN — FUROSEMIDE 40 MG: 10 INJECTION, SOLUTION INTRAMUSCULAR; INTRAVENOUS at 13:02

## 2020-02-05 RX ADMIN — Medication 5 ML: at 14:00

## 2020-02-05 RX ADMIN — Medication 10 ML: at 05:54

## 2020-02-05 RX ADMIN — Medication 10 ML: at 20:04

## 2020-02-05 RX ADMIN — WARFARIN SODIUM 5 MG: 5 TABLET ORAL at 18:11

## 2020-02-05 RX ADMIN — SODIUM CHLORIDE, SODIUM LACTATE, POTASSIUM CHLORIDE, AND CALCIUM CHLORIDE 125 ML/HR: 600; 310; 30; 20 INJECTION, SOLUTION INTRAVENOUS at 05:56

## 2020-02-05 NOTE — PROGRESS NOTES
Bedside and Verbal shift change report given to  Shaka Bland (oncoming nurse) by  Laurita wellington). Report included the following information SBAR, Kardex, MAR, Accordion, Recent Results and Med Rec Status.

## 2020-02-05 NOTE — PROGRESS NOTES
Nutrition Assessment:    RECOMMENDATIONS/INTERVENTION(S):   1. Continue regular diet. 2. Add Ensure Enlive BID to promote adequate po intake. 3. Continue to monitor intakes, wt changes, labs. ASSESSMENT:   2/5: F/u. Diet advanced to regular yesterday at lunch. Pt continues with poor po intake. Per wife, pt typically only eating a few bites of meals. Breakfast tray in room, some of fruit eaten, drank juice. Pt says he didn't have his dentures in so it was hard to eat any more of the fruit or the english muffin. Says he will wear his dentures for following meals. Pt says he has not been receiving Ensure Clear, will d/c. Pt would like to receive Ensure Enlive. Has tried in the past and likes it, will send BID. No c/o N/V. Labs- Ca 8.1, Bg 7.3, -131-152. 2/3: Pt assessed for MST>2 for wt loss. Admitted with dehydration. PMH includes TIA, CHF, CVA, CHF. BMI 25.9, WNL for age. Per chart, pt has not been eating/drinking much since hernia surgery 4 days ago. Pt reports poor appetite and po intake x1 week. Says he ate about 50% of broth, jello, italian ice, and 7-up for lunch today. Per wife, pt isn't a big eater anyway but has been eating less for the past week. Pt says he hasn't been able to taste food as well for several months, attributes it to medication. Pt reports UBW ~198#, # in chart. Wife and pt both say they have noticed some wt loss, though not sure how much. Recorded wts show 4% wt loss x1 month which is not considered significant for time frame but noted. Pt agreeable to trying Ensure Clear while on CLD. No c/o N/V. Labs- Ca 7.4, -165. Meds- LR @125mL/h. Diet Order: Regular  % Eaten:    Patient Vitals for the past 72 hrs:   % Diet Eaten   02/05/20 0952 25 %       Pertinent Medications: [x] Reviewed    Labs: [x] Reviewed    Anthropometrics: Height: 6' (182.9 cm) Weight: 95.3 kg (210 lb 1.6 oz)    IBW (%IBW):   ( ) UBW (%UBW):   (  %)      BMI: Body mass index is 28.49 kg/m². This BMI is indicative of:   [] Underweight    [x] Normal    [] Overweight    []  Obesity    []  Extreme Obesity (BMI>40)  Estimated Nutrition Needs (Based on): 2151 Kcals/day(1654 x 1.3 AF) , 86 g(0.8-1 g/kg) Protein  Carbohydrate: At Least 130 g/day  Fluids: 2151 mL/day (1 ml/kcal)    Last BM: 2/4 (loose)   [x]Active     []Hyperactive  []Hypoactive       [] Absent   BS  Skin:    [x] Intact   [] Incision  [] Breakdown   [] DTI   [] Tears/Excoriation/Abrasion  []Edema [] Other: Wt Readings from Last 30 Encounters:   02/04/20 95.3 kg (210 lb 1.6 oz)   01/30/20 86.6 kg (191 lb)   01/02/20 90.4 kg (199 lb 6 oz)   12/12/19 119.3 kg (263 lb)   12/13/17 88.5 kg (195 lb)   12/04/17 91.2 kg (201 lb)   12/01/17 88.2 kg (194 lb 7.1 oz)      NUTRITION DIAGNOSES:   Problem:  Inadequate oral intake     Etiology: related to decreased ability to consume sufficient po intake     Signs/Symptoms: as evidenced by pt reports poor appetite and po intake x1 week, currently on CLD      2/5: Nutrition dx continues, poor po intake of meals.     NUTRITION INTERVENTIONS:  Meals/Snacks: General/healthful diet   Supplements: Commercial supplement              GOAL:   PO intake >50% meals + ONS next 3-5 days    Cultural, Hoahaoism, or Ethnic Dietary Needs: None     EDUCATION & DISCHARGE NEEDS:    [x] None Identified   [] Identified and Education Provided/Documented   [] Identified and Pt declined/was not appropriate      [] Interdisciplinary Care Plan Reviewed/Documented    [x] Discharge Needs: Regular diet   [] No Nutrition Related Discharge Needs    NUTRITION RISK:   Pt Is At Nutrition Risk  [x]     No Nutrition Risk Identified  []       PT SEEN FOR:    []  MD Consult: []Calorie Count      []Diabetic Diet Education        []Diet Education     []Electrolyte Management     []General Nutrition Management and Supplements     []Management of Tube Feeding     []TPN Recommendations    []  RN Referral:  []MST score >=2     []Enteral/Parenteral Nutrition PTA     []Pregnant: Gestational DM or Multigestation                 [] Pressure Ulcer    []  Low BMI      []  Length of Stay       [] Dysphagia Diet         [] Ventilator  [x]  Follow-up     Previous Recommendations:   [x] Implemented          [] Not Implemented          [] Not Applicable    Previous Goal:   [] Met              [] Progressing Towards Goal              [x] Not Progressing Towards Goal   [] Not Applicable            Ashkan Song, [address] [facility]  Pager [phone]  Phone [phone]

## 2020-02-05 NOTE — NURSE NAVIGATOR
Chart reviewed by Heart Failure Nurse Navigator. Heart Failure database completed. EF:  45 to 50% with upper normal wall thickness, mild systolic dysfunction and prosthetic aortic valve. ACEi/ARB/ARNi: Entresto 49/51 mg BID on hold due to hypotension. BB: **    Aldosterone Antagonist: Aldactone 25 mg BID on hold due to hypotension    Obstructive Sleep Apnea Screening:   STOP-BANG score:   Referred to Sleep Medicine:     CRT BiV ICD in situ    NYHA Functional Class **. Heart Failure Teach Back in Patient Education. Heart Failure Avoiding Triggers on Discharge Instructions. Cardiologist: Dr. Susan Gilmore at [facility] discharge follow up phone call to be made within 48-72 hours of discharge.

## 2020-02-05 NOTE — PROGRESS NOTES
2/5/2020  4:28 PM  EMR reviewed, pt is continuing to require medical management, had B/L inguinal hernia repair 1/30/2020 admitted 2/3 for weakness, lightheaded. pt has received several transfusions, cardiology is following. CM will continue to follow for final d/c needs.   Alejandro Richardson

## 2020-02-05 NOTE — PROGRESS NOTES
[facility] Pharmacy Dosing Services: 2/5/20    Consult for Warfarin Dosing by Pharmacy by Sudhir Romero NP  Consult provided for this 69 y.o.  male , for indication of Mechanical Heart Valve (Aortic). PTA Dose: 2.5mg daily  Dose to achieve an INR goal of 2.5- 3.5 (per cards)    Order entered for  Warfarin  5 (mg) ordered to be given today at 18:00. Significant drug interactions: s/p transfusion  Previous dose given Held since admission   PT/INR Lab Results   Component Value Date/Time    INR 2.5 (H) 02/05/2020 12:32 AM      Platelets Lab Results   Component Value Date/Time    PLATELET 092 42/21/4983 12:18 AM      H/H Lab Results   Component Value Date/Time    HGB 7.3 (L) 02/05/2020 12:18 AM        Pharmacy to follow daily and will provide subsequent Warfarin dosing based on clinical status. [place] [id number] information [phone]

## 2020-02-05 NOTE — PROGRESS NOTES
Cardiology Progress Note         NAME:  Joy Bass   :   1945   MRN:   [id number]     Assessment/Plan:   1. HFrEF -Acute on chronic- weight up (?9kg); pBNP 5k. Now off IVF. Give add'll Lasix 40mg IV today. May need daily diuretic while holding hm Entresto/abiel. LVEF improved on echo 45-50%. .  2. Mechanical AVR - continue home coumadin . INR 2.9 2020     3. Paroxysmal Afib w BiV ICD - home home coumadin, INR therapeutic. Paced rhythm on admission      4. HTN - home entresto 49/51 BID, spironolactone 25mg BID on hold due to hypotension. BP stable for now.      5. S/p hernia repair with postoperative anemia - s/p PRBCs this admission. Hgb stable 7.3 today     6. HLD - on pravastatin 40mg daily     Pt personally seen and examined. Chart reviewed. Agree with advanced NP's history, exam and  A/P with changes/additons. 20   ECHO ADULT COMPLETE 2020    Narrative · Normal cavity size. Upper normal wall thickness. Mild systolic   dysfunction. Estimated left ventricular ejection fraction is 45 - 50%. Abnormal left ventricular septal motion consistent with right ventricular   pacing. Inconclusive left ventricular diastolic function. · Prosthetic aortic valve. There is a unknown type of mechanical aortic   valve. Prosthesis is normal. Aortic Valve Systolic Mean Gradient 3.3NQ Hg  · Trace mitral valve regurgitation is present. · Mild tricuspid valve regurgitation is present. · Pacer/ICD present. Signed by: Lisbet Julien MD         Discussed with patient/nursing/    Anupama Shultz MD, [facility]    Subjective:     Joy Bass is a 69 y.o. male presenting for dehydration, fatigue after recent bilateral inguinal hernia repair on 2020. Pt came to ED on 2020 complaining of fatigue, decreased PO intake postop, and generalized weakness. Pt found to be hypotensive and anemic. He was admitted and received 4L IVF.  On 2/3/2020 pt continued to have low BPs and Hgb decreased to 7.1. He received 1U PRBCs but this morning Hgb even lower at 5.9. He has received 2 additional units PRBCs today (total 3 units PRBCs).      Pt with hx of HFrEF, HTN, pAfib, aortic valve repair, implanted pacemaker/defibrillator. Followed by Dr. Luis Garcia [facility] cardiology) q3 mos, Dr. Aaron Blake [facility]) for his pacemaker and Dr. Cassandra Garcia [facility] cardiology) for his mechanical valve. INR/coumadin monitored by [facility]. Reports compliance w his home medications. Pt today feels OK. No abnormal bleeding. Passing stool. Feels more SOB than normal with occasional cough. No PND or orthopnea overnight. No significant leg swelling. States scrotal swelling noted since surgery; abd feels distended. N/v improved; still with very little appetite.        Cardiac ROS: Patient denies any chest pain, palpitations, syncope. Previous Cardiac Eval  EKG 11/27/17 - V paced, PVC's     ABELARDO/DCC 8/11/17 - LVEF 50-55%; Severe RV dilation and mod dysfunction. PFO with mod shunting. Normal mechanical AVR (mean gradient 4 mmHg), mod MR. DCCV 8/11/17-> NSR      ECHO 8/30/17 - TDS. Limited study. LVEF 50-55%, Mod RV dilation with RV dysfunction, CHARLIE, RVSP 37   ECHO 11/29/2017: EF 55-60%, now WMA, mild reduced RVEF, mod LAE, mild CHARLIE, mild-mod MR, mechanical AVR- trivial AI, mild TR, RVSP 36,   AV Vmax was 1.4 m/s.  AV maxPG was 7.9 mmHg.     CT Head 11/27/17 - no acute abn   Carotid Doppler 11/28/17 - 0-49% stenosis bilat     [facility] records reviewed 2/4/2020: followed by Dr Kajal Ca (HF clinic)   Permanent a fib  CHF   EF 30-35%    s/p mechanical AVR [place] 2004 2012 medtronic Claria MRI CRT-D bi V ICD.  V pacing 91% 1/15/2020 device function nml  Chronic systolic CHF:   ECHO 42/9142: AVR nml function, Mild MR, TR, LVEF 30-35% , RV function mod reduced.      4/24/19 RHC: RA 11/8, RV 30/8, PA30/13/19, PCW 15 CI 1.48           02/02/20   ECHO ADULT COMPLETE 2020    Narrative · Normal cavity size. Upper normal wall thickness. Mild systolic   dysfunction. Estimated left ventricular ejection fraction is 45 - 50%. Abnormal left ventricular septal motion consistent with right ventricular   pacing. Inconclusive left ventricular diastolic function. · Prosthetic aortic valve. There is a unknown type of mechanical aortic   valve. Prosthesis is normal. Aortic Valve Systolic Mean Gradient 7.4PA Hg  · Trace mitral valve regurgitation is present. · Mild tricuspid valve regurgitation is present. · Pacer/ICD present. Signed by: Esau Sorensen MD     .  Objective:     Visit Vitals  /60 (BP 1 Location: Right arm, BP Patient Position: At rest)   Pulse 75   Temp 97.9 °F (36.6 °C)   Resp 18   Ht 6' (1.829 m)   Wt 95.3 kg (210 lb 1.6 oz)   SpO2 100%   BMI 28.49 kg/m²      O2 Device: Room air     Wt Readings from Last 3 Encounters:   20 95.3 kg (210 lb 1.6 oz)   20 86.6 kg (191 lb)   20 90.4 kg (199 lb 6 oz)     Temp (24hrs), Av.2 °F (36.8 °C), Min:97.6 °F (36.4 °C), Max:98.8 °F (37.1 °C)      701 -  190  In: 118 [P.O.:118]  Out: -      190 -  0700  In: 1931.3 [P.O.:350; I.V.:800]  Out: 1200 [Urine:1200]     General - well developed well nourished  Neck-no JVD  CVS-S1-S2 present, 2/6 systolic murmur present; sys click +  RS-   CTAB, no crackles or wheezing      Abdomen-  Mildly distended / firm, +BS  LE-   +scrotal edema, no MINAL   Neuro - nonfocal  Psych - normal mood and affect    Additional comments: None    Care Plan discussed with:    Comments   Patient x    Family  x    RN x    Care Manager                    Consultant:          Data Review:     Results for Shanice Potrer (MRN [id number]) as of 2020 10:50   Ref.  Range 2017 00:47 2020 00:18   NT pro-BNP Latest Ref Range: <125 PG/ML 1,815 (H) 5,067 (H)       No lab exists for component: ITNL   Recent Labs     20   TROIQ <0.05 Recent Labs     02/05/20  0018 02/04/20  0420 02/03/20  0157 02/02/20 1952    140 140 136   K 4.0 4.5 4.7 4.2   * 110* 111* 104   CO2 21 23 22 23   BUN 33* 38* 26* 26*   CREA 1.20 1.45* 1.27 1.56*   * 131* 152* 165*   MG  --   --   --  1.6   ALB  --   --   --  3.3*   WBC 9.8 11.5* 10.2 14.5*   HGB 7.3* 5.9* 7.1* 9.7*   HCT 21.7* 17.8* 22.3* 29.7*    213 206 266     Recent Labs     02/05/20  0032 02/02/20 1952   INR 2.5* 2.9*   PTP 24.7* 27.7*   APTT  --  44.0*       Medications reviewed  Current Facility-Administered Medications   Medication Dose Route Frequency    furosemide (LASIX) injection 40 mg  40 mg IntraVENous ONCE    0.9% sodium chloride infusion 250 mL  250 mL IntraVENous PRN    diphenhydrAMINE (BENADRYL) injection 25 mg  25 mg IntraVENous Q6H PRN    0.9% sodium chloride infusion 250 mL  250 mL IntraVENous PRN    sodium chloride (NS) flush 5-40 mL  5-40 mL IntraVENous Q8H    sodium chloride (NS) flush 5-40 mL  5-40 mL IntraVENous PRN    acetaminophen (TYLENOL) tablet 650 mg  650 mg Oral Q6H PRN    oxyCODONE IR (ROXICODONE) tablet 5 mg  5 mg Oral Q4H PRN    oxyCODONE IR (ROXICODONE) tablet 10 mg  10 mg Oral Q4H PRN    HYDROmorphone (DILAUDID) syringe 0.5 mg  0.5 mg IntraVENous Q4H PRN    ondansetron (ZOFRAN) injection 4 mg  4 mg IntraVENous Q4H PRN         Sanket Murphy NP

## 2020-02-05 NOTE — PROGRESS NOTES
Feels better. Mildly short of breath at bedside Only sore when he coughs. BM loose. Tolerating liquids. Hb 7.1 after two units. Suspect some dilution after fluids given yesterday. He weight 197 on admission, 210 currently. Abdomen unremarkable. Some upper/lower extremity edema. Cardiology seeing. Fluids stopped, ongoing diuresis. Diet as tolerated. AM Labs. Daily weights.   Shirin Alcantar MD

## 2020-02-06 LAB
ANION GAP SERPL CALC-SCNC: 6 MMOL/L (ref 5–15)
BUN SERPL-MCNC: 30 MG/DL (ref 6–20)
BUN/CREAT SERPL: 28 (ref 12–20)
CALCIUM SERPL-MCNC: 8.6 MG/DL (ref 8.5–10.1)
CHLORIDE SERPL-SCNC: 109 MMOL/L (ref 97–108)
CO2 SERPL-SCNC: 26 MMOL/L (ref 21–32)
COMMENT, HOLDF: NORMAL
CREAT SERPL-MCNC: 1.07 MG/DL (ref 0.7–1.3)
GLUCOSE SERPL-MCNC: 106 MG/DL (ref 65–100)
HCT VFR BLD AUTO: 23.3 % (ref 36.6–50.3)
HGB BLD-MCNC: 7.6 G/DL (ref 12.1–17)
INR PPP: 1.9 (ref 0.9–1.1)
POTASSIUM SERPL-SCNC: 3.9 MMOL/L (ref 3.5–5.1)
PROTHROMBIN TIME: 19 SEC (ref 9–11.1)
SAMPLES BEING HELD,HOLD: NORMAL
SODIUM SERPL-SCNC: 141 MMOL/L (ref 136–145)

## 2020-02-06 PROCEDURE — 85018 HEMOGLOBIN: CPT

## 2020-02-06 PROCEDURE — 74011250637 HC RX REV CODE- 250/637: Performed by: NURSE PRACTITIONER

## 2020-02-06 PROCEDURE — 65660000000 HC RM CCU STEPDOWN

## 2020-02-06 PROCEDURE — 74011250636 HC RX REV CODE- 250/636: Performed by: SPECIALIST

## 2020-02-06 PROCEDURE — 80048 BASIC METABOLIC PNL TOTAL CA: CPT

## 2020-02-06 PROCEDURE — 85610 PROTHROMBIN TIME: CPT

## 2020-02-06 PROCEDURE — 36415 COLL VENOUS BLD VENIPUNCTURE: CPT

## 2020-02-06 RX ORDER — ONDANSETRON 4 MG/1
4 TABLET, ORALLY DISINTEGRATING ORAL
Status: DISCONTINUED | OUTPATIENT
Start: 2020-02-06 | End: 2020-02-10 | Stop reason: HOSPADM

## 2020-02-06 RX ORDER — DIPHENHYDRAMINE HCL 25 MG
25 CAPSULE ORAL
Status: DISCONTINUED | OUTPATIENT
Start: 2020-02-06 | End: 2020-02-10 | Stop reason: HOSPADM

## 2020-02-06 RX ORDER — MAGNESIUM SULFATE HEPTAHYDRATE 40 MG/ML
2 INJECTION, SOLUTION INTRAVENOUS ONCE
Status: COMPLETED | OUTPATIENT
Start: 2020-02-06 | End: 2020-02-06

## 2020-02-06 RX ORDER — WARFARIN SODIUM 5 MG/1
5 TABLET ORAL EVERY EVENING
Status: COMPLETED | OUTPATIENT
Start: 2020-02-06 | End: 2020-02-06

## 2020-02-06 RX ADMIN — WARFARIN SODIUM 5 MG: 5 TABLET ORAL at 18:42

## 2020-02-06 RX ADMIN — Medication 10 ML: at 13:20

## 2020-02-06 RX ADMIN — Medication 10 ML: at 06:15

## 2020-02-06 RX ADMIN — SACUBITRIL AND VALSARTAN 1 TABLET: 24; 26 TABLET, FILM COATED ORAL at 20:56

## 2020-02-06 RX ADMIN — Medication 10 ML: at 21:01

## 2020-02-06 RX ADMIN — MAGNESIUM SULFATE HEPTAHYDRATE 2 G: 40 INJECTION, SOLUTION INTRAVENOUS at 20:56

## 2020-02-06 RX ADMIN — SACUBITRIL AND VALSARTAN 1 TABLET: 24; 26 TABLET, FILM COATED ORAL at 13:19

## 2020-02-06 NOTE — PROGRESS NOTES
Bedside shift change report given to Amanda Chavez RN (oncoming nurse) by Carmelo Steve (offgoing nurse). Report included the following information SBAR and Kardex.

## 2020-02-06 NOTE — PROGRESS NOTES
[facility] Pharmacy Dosing Services: 2/6/20    Consult for Warfarin Dosing by Pharmacy by Tano Luciano NP  Consult provided for this 69 y.o.  male , for indication of Mechanical Heart Valve (Aortic). PTA Dose: 2.5mg daily  Dose to achieve an INR goal of 2.5- 3.5 (per cards)    Order entered for  Warfarin  5 (mg) ordered to be given today at 18:00. Significant drug interactions: s/p transfusion (2/2/20)  Previous dose given 5 mg on 2/5 (held prior to that since admission on 2/2)   PT/INR Lab Results   Component Value Date/Time    INR 1.9 (H) 02/06/2020 02:26 AM      Platelets Lab Results   Component Value Date/Time    PLATELET 409 48/63/2446 12:18 AM      H/H Lab Results   Component Value Date/Time    HGB 7.6 (L) 02/06/2020 02:26 AM        Pharmacy to follow daily and will provide subsequent Warfarin dosing based on clinical status.   [facility] Martinez)  Contact information [phone]

## 2020-02-06 NOTE — PROGRESS NOTES
INR 1.9. Hb 7.3. Feels better, no heavy breathing. Abdomen soft. Had loose bloody bowel movement. AM INR. Diuretic/meds per cardiology. Coumadin ongoing. PT for discharge planning.     Angela Mazariegos MD

## 2020-02-06 NOTE — PROGRESS NOTES
Cardiology Progress Note         NAME:  Spencer Galindo   :   1945   MRN:   [id number]     Assessment/Plan:   1. HFrEF -Acute on chronic- weight up but symptomatically stable; PE euvolemic. Now off IVF. S/p IV lasix x2 days. Held Entresto/abiel in setting of hypotension; will restart low dose of Entresto. LVEF improved on echo 45-50%. .  2. Mechanical AVR - continue home coumadin, INR subtherapeutic at 1.9 - pharm adjusting warfarin dose      3. Paroxysmal Afib w BiV ICD - home home coumadin. Paced rhythm on admission      4. HTN - home entresto 49/51 BID, spironolactone 25mg BID on hold due to hypotension. BP now improved/stable.        5. S/p hernia repair with postoperative anemia - s/p PRBCs this admission. Hgb stable 7.6 today     6. HLD - on pravastatin 40mg daily     Pt personally seen and examined. Chart reviewed. Agree with advanced NP's history, exam and  A/P with changes/additons. Uptitrate meds as tolerated    F/u primary cardiologist at [facility]. MD, [facility]    Subjective:     Pt feels well today and overall without complaint. Had BM this morning with scant blood. Breathing now stable; No PND or orthopnea overnight. Continues to have mild cough. No significant leg swelling. Abd feels soft. N/v improved.        Cardiac ROS: Patient denies any chest pain, palpitations, syncope. Previous Cardiac Eval  EKG 17 - V paced, PVC's     ABELARDO/DCC 17 - LVEF 50-55%; Severe RV dilation and mod dysfunction. PFO with mod shunting. Normal mechanical AVR (mean gradient 4 mmHg), mod MR. DCCV 17-> NSR      ECHO 17 - TDS. Limited study.  LVEF 50-55%, Mod RV dilation with RV dysfunction, CHARLIE, RVSP 37   ECHO 2017: EF 55-60%, now WMA, mild reduced RVEF, mod LAE, mild CHARLIE, mild-mod MR, mechanical AVR- trivial AI, mild TR, RVSP 36,   AV Vmax was 1.4 m/s.  AV maxPG was 7.9 mmHg.     CT Head 17 - no acute abn   Carotid Doppler 17 - 0-49% stenosis bilat     [facility] records reviewed 2020: followed by Dr Rashida Orantes (HF clinic)   Permanent a fib  CHF   EF 30-35%    s/p mechanical AVR [place] 2004 medtronic Claria MRI CRT-D bi V ICD. V pacing 91% 1/15/2020 device function nml  Chronic systolic CHF:   ECHO : AVR nml function, Mild MR, TR, LVEF 30-35% , RV function mod reduced.      19 RHC: RA 11/8, RV 30/8, PA30, PCW 15 CI 1.48           20   ECHO ADULT COMPLETE 2020    Narrative · Normal cavity size. Upper normal wall thickness. Mild systolic   dysfunction. Estimated left ventricular ejection fraction is 45 - 50%. Abnormal left ventricular septal motion consistent with right ventricular   pacing. Inconclusive left ventricular diastolic function. · Prosthetic aortic valve. There is a unknown type of mechanical aortic   valve. Prosthesis is normal. Aortic Valve Systolic Mean Gradient 8.4NW Hg  · Trace mitral valve regurgitation is present. · Mild tricuspid valve regurgitation is present. · Pacer/ICD present.         Signed by: Domenica Cifuentes MD     .  Objective:     Visit Vitals  /72 (BP 1 Location: Left arm, BP Patient Position: At rest)   Pulse 77   Temp 98.3 °F (36.8 °C)   Resp 20   Ht 6' (1.829 m)   Wt 95.3 kg (210 lb 3.2 oz)   SpO2 98%   BMI 28.51 kg/m²      O2 Device: Room air     Wt Readings from Last 3 Encounters:   20 95.3 kg (210 lb 3.2 oz)   20 86.6 kg (191 lb)   20 90.4 kg (199 lb 6 oz)     Temp (24hrs), Av.4 °F (36.9 °C), Min:98.2 °F (36.8 °C), Max:98.6 °F (37 °C)    701 - 1900  In: -   Out: 180 [Urine:180]    1901 - 700  In: 952 [P.O.:761]  Out: 6327 [Urine:2775]     General - well developed well nourished  Neck-no JVD  CVS-S1-S2 present, 2/6 systolic murmur present; sys click +  RS-   CTAB, no crackles or wheezing      Abdomen-  protuberant, soft, non-tender, +BS  LE-   no MINAL, warm, well perfused  Neuro - nonfocal  Psych - normal mood and affect    Additional comments: None    Care Plan discussed with:    Comments   Patient x    Family  x    RN x    Care Manager                    Consultant:          Data Review:     Results for Kishor Saucedo (MRN [id number]) as of 2/5/2020 10:50   Ref. Range 11/29/2017 00:47 2/5/2020 00:18   NT pro-BNP Latest Ref Range: <125 PG/ML 1,815 (H) 5,067 (H)       No lab exists for component: ITNL   No results for input(s): CPK, CKMB, TROIQ in the last 72 hours.   Recent Labs     02/06/20  1010 02/06/20  0226 02/05/20  0018 02/04/20  0420     --  140 140   K 3.9  --  4.0 4.5   *  --  112* 110*   CO2 26  --  21 23   BUN 30*  --  33* 38*   CREA 1.07  --  1.20 1.45*   *  --  114* 131*   WBC  --   --  9.8 11.5*   HGB  --  7.6* 7.3* 5.9*   HCT  --  23.3* 21.7* 17.8*   PLT  --   --  191 213     Recent Labs     02/06/20  0226 02/05/20  0032   INR 1.9* 2.5*   PTP 19.0* 24.7*       Medications reviewed  Current Facility-Administered Medications   Medication Dose Route Frequency    Warfarin- Pharmacy Dosing    Other Rx Dosing/Monitoring    0.9% sodium chloride infusion 250 mL  250 mL IntraVENous PRN    diphenhydrAMINE (BENADRYL) injection 25 mg  25 mg IntraVENous Q6H PRN    0.9% sodium chloride infusion 250 mL  250 mL IntraVENous PRN    sodium chloride (NS) flush 5-40 mL  5-40 mL IntraVENous Q8H    sodium chloride (NS) flush 5-40 mL  5-40 mL IntraVENous PRN    acetaminophen (TYLENOL) tablet 650 mg  650 mg Oral Q6H PRN    oxyCODONE IR (ROXICODONE) tablet 5 mg  5 mg Oral Q4H PRN    oxyCODONE IR (ROXICODONE) tablet 10 mg  10 mg Oral Q4H PRN    HYDROmorphone (DILAUDID) syringe 0.5 mg  0.5 mg IntraVENous Q4H PRN    ondansetron (ZOFRAN) injection 4 mg  4 mg IntraVENous Q4H PRN         Maxi Frye NP

## 2020-02-07 ENCOUNTER — APPOINTMENT (OUTPATIENT)
Dept: GENERAL RADIOLOGY | Age: 75
DRG: 811 | End: 2020-02-07
Attending: NURSE PRACTITIONER
Payer: MEDICARE

## 2020-02-07 LAB
ANION GAP SERPL CALC-SCNC: 7 MMOL/L (ref 5–15)
BACTERIA SPEC CULT: NORMAL
BUN SERPL-MCNC: 26 MG/DL (ref 6–20)
BUN/CREAT SERPL: 30 (ref 12–20)
CALCIUM SERPL-MCNC: 8.2 MG/DL (ref 8.5–10.1)
CHLORIDE SERPL-SCNC: 108 MMOL/L (ref 97–108)
CO2 SERPL-SCNC: 25 MMOL/L (ref 21–32)
COMMENT, HOLDF: NORMAL
CREAT SERPL-MCNC: 0.88 MG/DL (ref 0.7–1.3)
ERYTHROCYTE [DISTWIDTH] IN BLOOD BY AUTOMATED COUNT: 17.6 % (ref 11.5–14.5)
GLUCOSE SERPL-MCNC: 108 MG/DL (ref 65–100)
HCT VFR BLD AUTO: 25.3 % (ref 36.6–50.3)
HGB BLD-MCNC: 8 G/DL (ref 12.1–17)
INR PPP: 1.7 (ref 0.9–1.1)
MAGNESIUM SERPL-MCNC: 2.2 MG/DL (ref 1.6–2.4)
MCH RBC QN AUTO: 29.9 PG (ref 26–34)
MCHC RBC AUTO-ENTMCNC: 31.6 G/DL (ref 30–36.5)
MCV RBC AUTO: 94.4 FL (ref 80–99)
NRBC # BLD: 0.02 K/UL (ref 0–0.01)
NRBC BLD-RTO: 0.2 PER 100 WBC
PLATELET # BLD AUTO: 251 K/UL (ref 150–400)
PMV BLD AUTO: 10.5 FL (ref 8.9–12.9)
POTASSIUM SERPL-SCNC: 4.1 MMOL/L (ref 3.5–5.1)
PROTHROMBIN TIME: 16.6 SEC (ref 9–11.1)
RBC # BLD AUTO: 2.68 M/UL (ref 4.1–5.7)
SAMPLES BEING HELD,HOLD: NORMAL
SERVICE CMNT-IMP: NORMAL
SODIUM SERPL-SCNC: 140 MMOL/L (ref 136–145)
WBC # BLD AUTO: 8.6 K/UL (ref 4.1–11.1)

## 2020-02-07 PROCEDURE — 74011250636 HC RX REV CODE- 250/636: Performed by: NURSE PRACTITIONER

## 2020-02-07 PROCEDURE — 80048 BASIC METABOLIC PNL TOTAL CA: CPT

## 2020-02-07 PROCEDURE — 97161 PT EVAL LOW COMPLEX 20 MIN: CPT

## 2020-02-07 PROCEDURE — 97116 GAIT TRAINING THERAPY: CPT

## 2020-02-07 PROCEDURE — 74011250637 HC RX REV CODE- 250/637: Performed by: NURSE PRACTITIONER

## 2020-02-07 PROCEDURE — 65660000000 HC RM CCU STEPDOWN

## 2020-02-07 PROCEDURE — 71045 X-RAY EXAM CHEST 1 VIEW: CPT

## 2020-02-07 PROCEDURE — 85027 COMPLETE CBC AUTOMATED: CPT

## 2020-02-07 PROCEDURE — 36415 COLL VENOUS BLD VENIPUNCTURE: CPT

## 2020-02-07 PROCEDURE — 83735 ASSAY OF MAGNESIUM: CPT

## 2020-02-07 PROCEDURE — 85610 PROTHROMBIN TIME: CPT

## 2020-02-07 RX ORDER — FUROSEMIDE 10 MG/ML
40 INJECTION INTRAMUSCULAR; INTRAVENOUS ONCE
Status: COMPLETED | OUTPATIENT
Start: 2020-02-07 | End: 2020-02-07

## 2020-02-07 RX ORDER — WARFARIN SODIUM 5 MG/1
5 TABLET ORAL EVERY EVENING
Status: DISCONTINUED | OUTPATIENT
Start: 2020-02-07 | End: 2020-02-07

## 2020-02-07 RX ORDER — WARFARIN SODIUM 5 MG/1
5 TABLET ORAL EVERY EVENING
Status: COMPLETED | OUTPATIENT
Start: 2020-02-07 | End: 2020-02-07

## 2020-02-07 RX ORDER — SPIRONOLACTONE 25 MG/1
25 TABLET ORAL DAILY
Status: DISCONTINUED | OUTPATIENT
Start: 2020-02-07 | End: 2020-02-07

## 2020-02-07 RX ADMIN — Medication 10 ML: at 05:21

## 2020-02-07 RX ADMIN — Medication 10 ML: at 09:44

## 2020-02-07 RX ADMIN — WARFARIN SODIUM 5 MG: 5 TABLET ORAL at 17:57

## 2020-02-07 RX ADMIN — FUROSEMIDE 40 MG: 10 INJECTION, SOLUTION INTRAMUSCULAR; INTRAVENOUS at 11:54

## 2020-02-07 RX ADMIN — SACUBITRIL AND VALSARTAN 1 TABLET: 24; 26 TABLET, FILM COATED ORAL at 09:43

## 2020-02-07 RX ADMIN — SACUBITRIL AND VALSARTAN 1 TABLET: 24; 26 TABLET, FILM COATED ORAL at 20:43

## 2020-02-07 RX ADMIN — Medication 10 ML: at 20:43

## 2020-02-07 NOTE — PROGRESS NOTES
Problem: Mobility Impaired (Adult and Pediatric)  Goal: *Acute Goals and Plan of Care (Insert Text)  Description  FUNCTIONAL STATUS PRIOR TO ADMISSION: Patient was independent and active without use of DME.    HOME SUPPORT PRIOR TO ADMISSION: The patient lived with wife but did not require assist.    Physical Therapy Goals  Initiated 2/7/2020  1. Patient will move from supine to sit and sit to supine , scoot up and down and roll side to side in bed with modified independence within 7 day(s). 2.  Patient will transfer from bed to chair and chair to bed with modified independence using the least restrictive device within 7 day(s). 3.  Patient will perform sit to stand with modified independence within 7 day(s). 4.  Patient will ambulate with modified independence for 150 feet with the least restrictive device within 7 day(s). 5.  Patient will ascend/descend 4 stairs with 1 handrail(s) with modified independence within 7 day(s). Note:   PHYSICAL THERAPY EVALUATION  Patient: Areli Lucia (82 y.o. male)  Date: 2/7/2020  Primary Diagnosis: Dehydration [E86.0]  Dehydration [E86.0]        Precautions: falls         ASSESSMENT  Based on the objective data described below, the patient presents with decreased endurance balance and general strength. Patient admitted 2/3 with dehydration weakness and lightheadedness, found +GI bleed. He had bilateral inguinal hernia repairs on 1/30/2020. He is on chronic coumadin for AVR, pacemaker. Patient was completely independent prior to admission. Today patient short of breath with activity but does not drop below 92% or HR greater than 86 bpm. Patient is relying on support of RW for balance today. He should benefit from daily PT to improve endurance and he reports MD has discussed potential short term rehab stay at discharge. If patient discharges directly to home he will need RW and BSC for home. Discharge disposition pending progress.      Documentation for home O2:     ROOM AIR    AT REST   O2 SATS  95 HR  78   ROOM AIR WITH ACTIVITY 02 SATS  98 HR  86           ROOM AIR PATIENT LEFT COMFORTABLY  SITTING/SUPINE 02 SATS  100 HR  86        Current Level of Function Impacting Discharge (mobility/balance):  CG-min x 1 assistance with RW    Functional Outcome Measure: The patient scored 60/100 on the Barthel Index outcome measure. Patient will benefit from skilled therapy intervention to address the above noted impairments. PLAN :  Recommendations and Planned Interventions: bed mobility training, transfer training, gait training, therapeutic exercises, neuromuscular re-education, patient and family training/education, and therapeutic activities      Frequency/Duration: Patient will be followed by physical therapy:  6 times a week to address goals. Recommendation for discharge: (in order for the patient to meet his/her long term goals)  Therapy up to 5 days/week in SNF setting or intensive home health therapy program    This discharge recommendation:  Has not yet been discussed the attending provider and/or case management    IF patient discharges home will need the following DME: bedside commode and rolling walker         SUBJECTIVE:   Patient stated I am not sure whether I will go home or go to rehab .     OBJECTIVE DATA SUMMARY:   HISTORY:    Past Medical History:   Diagnosis Date    Anticoagulated on Coumadin 11/27/2017    Arthritis     Atrial fibrillation ([address]     PAF    Cardiomyopathy ([address]     GI bleed 02/2017    HTN (hypertension)     Hypothyroidism     Melanoma in situ ([address]     Orthostatic hypotension 11/29/2017    Pacemaker 11/28/2017    Medtronic BiV ICD    S/P AVR (aortic valve replacement)     mechanical AVR    Syncope 1872    Systolic heart failure ([address]     TIA (transient ischemic attack) 2017     Past Surgical History:   Procedure Laterality Date    HX AORTIC VALVE REPLACEMENT  2003    mechanical aortic valve     HX KNEE ARTHROSCOPY Right 1980's    HX MOHS PROCEDURES Left 2005    Cheek and [address]    HX PACEMAKER  2011    with defibulator    HX TONSILLECTOMY  1952       Personal factors and/or comorbidities impacting plan of care: independent without an assistive device    Home Situation  Home Environment: Private residence  # Steps to Enter: 4  Rails to Enter: Yes  Hand Rails : Bilateral  One/Two Story Residence: Two story, live on 1st floor  Living Alone: No  Support Systems: Spouse/Significant Other/Partner  Patient Expects to be Discharged to[de-identified] Private residence  Current DME Used/Available at Home: Blood pressure cuff  Tub or Shower Type: Shower    EXAMINATION/PRESENTATION/DECISION MAKING:   Critical Behavior:  Neurologic State: Alert  Orientation Level: Oriented X4  Cognition: Appropriate decision making, Appropriate for age attention/concentration, Appropriate safety awareness, Follows commands  Safety/Judgement: Awareness of environment, Fall prevention  Hearing: Auditory  Auditory Impairment: None    Range Of Motion:  AROM: Within functional limits           PROM: Within functional limits           Strength:    Strength: Generally decreased, functional                    Tone & Sensation:   Tone: Normal              Sensation: Intact               Coordination:  Coordination: Within functional limits       Functional Mobility:  Bed Mobility:  Rolling: (up in chair on arrival)           Transfers:  Sit to Stand: Contact guard assistance  Stand to Sit: Contact guard assistance  Stand Pivot Transfers: Contact guard assistance                    Balance:   Sitting: Intact; Without support  Standing: Intact; With support  Ambulation/Gait Training:  Distance (ft): 80 Feet (ft)(80, 120' with rest break in between)  Assistive Device: Gait belt;Walker, rolling  Ambulation - Level of Assistance: Contact guard assistance        Gait Abnormalities: Decreased step clearance; Path deviations;Trunk sway increased     Stairs:     Stairs - Level of Assistance: (NT)    Functional Measure:  Barthel Index:    Bathin  Bladder: 10  Bowels: 0  Groomin  Dressin  Feeding: 10  Mobility: 10  Stairs: 0  Toilet Use: 10  Transfer (Bed to Chair and Back): 10  Total: 60/100       The Barthel ADL Index: Guidelines  1. The index should be used as a record of what a patient does, not as a record of what a patient could do. 2. The main aim is to establish degree of independence from any help, physical or verbal, however minor and for whatever reason. 3. The need for supervision renders the patient not independent. 4. A patient's performance should be established using the best available evidence. Asking the patient, friends/relatives and nurses are the usual sources, but direct observation and common sense are also important. However direct testing is not needed. 5. Usually the patient's performance over the preceding 24-48 hours is important, but occasionally longer periods will be relevant. 6. Middle categories imply that the patient supplies over 50 per cent of the effort. 7. Use of aids to be independent is allowed. Loretta Sims., Barthel, D.W. (9743). Functional evaluation: the Barthel Index. [facility] (14)2. ELIEL JoeF, Jimmy Suarez., Summer Mayo., Elizabeth, [address] [place] [address] (). Measuring the change indisability after inpatient rehabilitation; comparison of the responsiveness of the Barthel Index and Functional Garvin Measure. Journal of Neurology, Neurosurgery, and Psychiatry, 66(4), 081-731. Enio Harvey, N.J.A, MATEO Mehta, & Ally Boswell M.A. (2004.) Assessment of post-stroke quality of life in cost-effectiveness studies: The usefulness of the Barthel Index and the EuroQoL-5D.  Quality of Life Research, 15, 175-90           Physical Therapy Evaluation Charge Determination   History Examination Presentation Decision-Making   LOW Complexity : Zero comorbidities / personal factors that will impact the outcome / POC LOW Complexity : 1-2 Standardized tests and measures addressing body structure, function, activity limitation and / or participation in recreation  LOW Complexity : Stable, uncomplicated  Other outcome measures Barthel Index  LOW       Based on the above components, the patient evaluation is determined to be of the following complexity level: LOW     Pain Ratin/10    Activity Tolerance:   Fair, requires rest breaks, and observed SOB with activity  Please refer to the flowsheet for vital signs taken during this treatment. After treatment patient left in no apparent distress:   Sitting in chair, Call bell within reach, and Caregiver / family present    COMMUNICATION/EDUCATION:   The patients plan of care was discussed with: Registered Nurse. Fall prevention education was provided and the patient/caregiver indicated understanding. and Patient/family have participated as able in goal setting and plan of care.     Thank you for this referral.  Gina Gutierres, PT, DPT   Time Calculation: 36 mins

## 2020-02-07 NOTE — PROGRESS NOTES
1734 Pt has 8 beat run of vtach, then returns to paced rhythm. Pt denies Heart palpitation, chest pain is sitting in bed reading. Dr Marisa Menendez notified no further orders      1904 pt has a 5 beat run of vtach then back to paced rhythm.  Pt denies heart palpitations, chest pain, sitting up in bed eating dinner Dr Musa Valderrama notified via perfect serve instructed to please call unit if any orders due to shift change   1916 [facility] cardiology on call  1925 Spoke to Dr Hali Lemus, he ordered magnesium IV 2 grams over 20 mins, cannot give over 20 mins on this floor, given over 2 hrs will recheck magnesium level in am

## 2020-02-07 NOTE — PROGRESS NOTES
[facility] Dosing Services: 2/7/20    Addendum:   · CBC resulted - H/H 8.0/25.3 (improved)    Consult for Warfarin Dosing by Pharmacy by Jalil Hewitt NP  Consult provided for this 69 y.o.  male , for indication of Mechanical Heart Valve (Aortic). PTA Dose: 2.5mg daily  Dose to achieve an INR goal of 2.5- 3.5 (per cards)     INR dropped to 1.7 today (from 1.9 yesterday) which is still SUBtherapeutic   Notified cardiology to consider bridging with enoxaparin since INR is not improving if appropriate (i.e. if H/H stable and there are no more bloody stools)   Also, there was no CBC ordered for today, so also recommended ordering CBC to evaluate H/H  Order entered for Warfarin  5 (mg) ordered to be given today at 18:00. Significant drug interactions: s/p transfusion (2/2/20)  Previous dose given 5 mg on 2/6   PT/INR Lab Results   Component Value Date/Time    INR 1.7 (H) 02/07/2020 03:35 AM      Platelets Lab Results   Component Value Date/Time    PLATELET 679 48/46/6927 12:18 AM      H/H Lab Results   Component Value Date/Time    HGB 7.6 (L) 02/06/2020 02:26 AM        Pharmacy to follow daily and will provide subsequent Warfarin dosing based on clinical status.   KVNG Mckay)  Contact information [phone]

## 2020-02-07 NOTE — PROGRESS NOTES
INR 1.7. Hb 8. Feels better, no heavy breathing. Had loose BM with some blood today. Abdomen soft. Scrotum swollen. AM INR. Diuretic/meds per cardiology. Coumadin ongoing. PT for discharge planning. Looks good.      Carol Gomez MD

## 2020-02-07 NOTE — PROGRESS NOTES
Bedside and Verbal shift change report given to Mary Manning RN (oncoming nurse) by Jaciel Arevalo RN (offgoing nurse). Report included the following information SBAR, Intake/Output, MAR and Recent Results.

## 2020-02-07 NOTE — PROGRESS NOTES
Cardiology Progress Note         NAME:  Nannette Betancourt   :   1945   MRN:   [id number]     Assessment/Plan:   HFrEF -Acute on chronic- weight downtrending; will give add'll IV lasix 2/2 scrotal swelling. on low dose of Entresto; cont to hold abiel (held d/t hypotension). LVEF improved on echo 45-50%. Mechanical AVR - continue home coumadin, INR subtherapeutic at 1.7 - pharm adjusting warfarin dose  - hold off on bridge therapy for now 2/2 ongoing hematochezia     Paroxysmal Afib w BiV ICD - home home coumadin. Paced rhythm 70s      HTN - home entresto 49/51 BID, spironolactone 25mg BID on hold due to hypotension. BP now improved/stable; on low dose entresto        S/p hernia repair with postoperative anemia - s/p PRBCs this admission. Hgb stable      HLD - on pravastatin 40mg daily    Hematochezia - surgical team notified    NSVT-  brief on tele; up to 8b overnight. Electrolytes stable. Cont to monitor     Pt personally seen and examined. Chart reviewed. Agree with advanced NP's history, exam and  A/P with changes/additons. Discussed with patient/wife  Patient having intermittent bloody stools. INR subtherapeutic but because of his hematochezia, will not bridge him for now and continue Coumadin and let the INR become therapeutic. Coumadin dosing as per pharmacy. Follow-up with Dr. Quintin Whitaker cardiology. We will see PRN over the weekend. Dr. Brielle Rodgers on call if needed. Salavdor Barrera MD, [facility]      Subjective:     Pt feels well and is up in chair. Had some worsening cough overnight while lying in bed. Reports rattle in his chest; improved with coughing. Had BM this morning; again with passage of blood  . Breathing stable up in chair. No significant leg swelling. Abd feels soft. N/v improved. Scrotal swelling ongoing; difficult to urinate and move around as result.        Cardiac ROS: Patient denies any chest pain and syncope.  Brief palpitations overnight; no pain. Previous Cardiac Eval  EKG 11/27/17 - V paced, PVC's     ABELARDO/DCC 8/11/17 - LVEF 50-55%; Severe RV dilation and mod dysfunction. PFO with mod shunting. Normal mechanical AVR (mean gradient 4 mmHg), mod MR. DCCV 8/11/17-> NSR      ECHO 8/30/17 - TDS. Limited study. LVEF 50-55%, Mod RV dilation with RV dysfunction, CHARLIE, RVSP 37   ECHO 11/29/2017: EF 55-60%, now WMA, mild reduced RVEF, mod LAE, mild CHARLIE, mild-mod MR, mechanical AVR- trivial AI, mild TR, RVSP 36,   AV Vmax was 1.4 m/s.  AV maxPG was 7.9 mmHg.     CT Head 11/27/17 - no acute abn   Carotid Doppler 11/28/17 - 0-49% stenosis bilat     [facility] records reviewed 2/4/2020: followed by Dr Tyrel Steele (HF clinic)   Permanent a fib  CHF   EF 30-35%    s/p mechanical AVR [place] 2004 2012 medtronic Claria MRI CRT-D bi V ICD. V pacing 91% 1/15/2020 device function nml  Chronic systolic CHF:   ECHO 95/2789: AVR nml function, Mild MR, TR, LVEF 30-35% , RV function mod reduced.      4/24/19 RHC: RA 11/8, RV 30/8, PA30/13/19, PCW 15 CI 1.48           02/02/20   ECHO ADULT COMPLETE 02/04/2020 2/4/2020    Narrative · Normal cavity size. Upper normal wall thickness. Mild systolic   dysfunction. Estimated left ventricular ejection fraction is 45 - 50%. Abnormal left ventricular septal motion consistent with right ventricular   pacing. Inconclusive left ventricular diastolic function. · Prosthetic aortic valve. There is a unknown type of mechanical aortic   valve. Prosthesis is normal. Aortic Valve Systolic Mean Gradient 3.0NF Hg  · Trace mitral valve regurgitation is present. · Mild tricuspid valve regurgitation is present. · Pacer/ICD present.         Signed by: Estephania Tyler MD     .  Objective:     Visit Vitals  /73 (BP 1 Location: Right arm, BP Patient Position: Sitting)   Pulse 87   Temp 98 °F (36.7 °C)   Resp 16   Ht 6' (1.829 m)   Wt 91.5 kg (201 lb 11.5 oz)   SpO2 97%   BMI 27.36 kg/m²      O2 Device: Room air     Wt Readings from Last 3 Encounters:   20 91.5 kg (201 lb 11.5 oz)   20 86.6 kg (191 lb)   20 90.4 kg (199 lb 6 oz)     Temp (24hrs), Av.2 °F (36.8 °C), Min:97.5 °F (36.4 °C), Max:98.6 °F (37 °C)    No intake/output data recorded.  1901 -  0700  In: 1186 [P.O.:1186]  Out: [facility] [Urine:585]     General - well developed well nourished  Neck-no JVD  CVS-S1-S2 present, 2/ systolic murmur present; sys click +  RS-   CTAB, no crackles or wheezing      Abdomen-  protuberant, soft, non-tender, +BS  LE-   no MINAL, warm, well perfused, +scrotal swelling  Neuro - nonfocal  Psych - normal mood and affect    Additional comments: None    Care Plan discussed with:    Comments   Patient x    Family      RN x    Care Manager                    Consultant:          Data Review:     Results for Kenneth Walden (MRN [id number]) as of 2020 10:50   Ref. Range 2017 00:47 2020 00:18   NT pro-BNP Latest Ref Range: <125 PG/ML 1,815 (H) 5,067 (H)       No lab exists for component: ITNL   No results for input(s): CPK, CKMB, TROIQ in the last 72 hours.   Recent Labs     205 20  1010 20  0226 20  0018    141  --  140   K 4.1 3.9  --  4.0    109*  --  112*   CO2 25 26  --  21   BUN 26* 30*  --  33*   CREA 0.88 1.07  --  1.20   * 106*  --  114*   MG 2.2  --   --   --    WBC 8.6  --   --  9.8   HGB 8.0*  --  7.6* 7.3*   HCT 25.3*  --  23.3* 21.7*     --   --  191     Recent Labs     20  0335 20  0226 20  0032   INR 1.7* 1.9* 2.5*   PTP 16.6* 19.0* 24.7*       Medications reviewed  Current Facility-Administered Medications   Medication Dose Route Frequency    warfarin (COUMADIN) tablet 5 mg  5 mg Oral QPM    sacubitril-valsartan (ENTRESTO) 24-26 mg tablet 1 Tab  1 Tab Oral Q12H    furosemide (LASIX) injection 40 mg  40 mg IntraVENous ONCE    ondansetron (ZOFRAN ODT) tablet 4 mg  4 mg Oral Q8H PRN    diphenhydrAMINE (BENADRYL) capsule 25 mg  25 mg Oral Q6H PRN    Warfarin- Pharmacy Dosing    Other Rx Dosing/Monitoring    0.9% sodium chloride infusion 250 mL  250 mL IntraVENous PRN    0.9% sodium chloride infusion 250 mL  250 mL IntraVENous PRN    sodium chloride (NS) flush 5-40 mL  5-40 mL IntraVENous Q8H    sodium chloride (NS) flush 5-40 mL  5-40 mL IntraVENous PRN    acetaminophen (TYLENOL) tablet 650 mg  650 mg Oral Q6H PRN    oxyCODONE IR (ROXICODONE) tablet 5 mg  5 mg Oral Q4H PRN    oxyCODONE IR (ROXICODONE) tablet 10 mg  10 mg Oral Q4H PRN         Shelby Antonio, NP

## 2020-02-07 NOTE — PROGRESS NOTES
Bedside shift change report given to [address] (oncoming nurse) by Feliz Shoemaker (offgoing nurse). Report included the following information SBAR, Kardex, Intake/Output, MAR, Recent Results, Alarm Parameters , Pre Procedure Checklist, Procedure Verification and Quality Measures.

## 2020-02-07 NOTE — PROGRESS NOTES
2-7-2020 CASE MANAGEMENT NOTE:  I met with the patient who stated he is feeling much better but still very weak. He understands he may need some rehab before returning home and will be agreeable if so. CM noted order for a rolling walker but will hold off to determine if rehab will be needed.  ERAN Matthews, CM

## 2020-02-08 LAB
ANION GAP SERPL CALC-SCNC: 9 MMOL/L (ref 5–15)
BUN SERPL-MCNC: 26 MG/DL (ref 6–20)
BUN/CREAT SERPL: 29 (ref 12–20)
CALCIUM SERPL-MCNC: 8.6 MG/DL (ref 8.5–10.1)
CHLORIDE SERPL-SCNC: 106 MMOL/L (ref 97–108)
CO2 SERPL-SCNC: 22 MMOL/L (ref 21–32)
CREAT SERPL-MCNC: 0.91 MG/DL (ref 0.7–1.3)
ERYTHROCYTE [DISTWIDTH] IN BLOOD BY AUTOMATED COUNT: 16.9 % (ref 11.5–14.5)
GLUCOSE SERPL-MCNC: 97 MG/DL (ref 65–100)
HCT VFR BLD AUTO: 28.8 % (ref 36.6–50.3)
HGB BLD-MCNC: 9.2 G/DL (ref 12.1–17)
INR PPP: 1.8 (ref 0.9–1.1)
MAGNESIUM SERPL-MCNC: 2.1 MG/DL (ref 1.6–2.4)
MCH RBC QN AUTO: 29.7 PG (ref 26–34)
MCHC RBC AUTO-ENTMCNC: 31.9 G/DL (ref 30–36.5)
MCV RBC AUTO: 92.9 FL (ref 80–99)
NRBC # BLD: 0 K/UL (ref 0–0.01)
NRBC BLD-RTO: 0 PER 100 WBC
PLATELET # BLD AUTO: 261 K/UL (ref 150–400)
PMV BLD AUTO: 9.6 FL (ref 8.9–12.9)
POTASSIUM SERPL-SCNC: 5 MMOL/L (ref 3.5–5.1)
PROTHROMBIN TIME: 18.2 SEC (ref 9–11.1)
RBC # BLD AUTO: 3.1 M/UL (ref 4.1–5.7)
SODIUM SERPL-SCNC: 137 MMOL/L (ref 136–145)
WBC # BLD AUTO: 10.8 K/UL (ref 4.1–11.1)

## 2020-02-08 PROCEDURE — 74011250637 HC RX REV CODE- 250/637: Performed by: NURSE PRACTITIONER

## 2020-02-08 PROCEDURE — 85027 COMPLETE CBC AUTOMATED: CPT

## 2020-02-08 PROCEDURE — 36415 COLL VENOUS BLD VENIPUNCTURE: CPT

## 2020-02-08 PROCEDURE — 97116 GAIT TRAINING THERAPY: CPT

## 2020-02-08 PROCEDURE — 97530 THERAPEUTIC ACTIVITIES: CPT

## 2020-02-08 PROCEDURE — 83735 ASSAY OF MAGNESIUM: CPT

## 2020-02-08 PROCEDURE — 74011250637 HC RX REV CODE- 250/637: Performed by: INTERNAL MEDICINE

## 2020-02-08 PROCEDURE — 65660000000 HC RM CCU STEPDOWN

## 2020-02-08 PROCEDURE — 85610 PROTHROMBIN TIME: CPT

## 2020-02-08 PROCEDURE — 80048 BASIC METABOLIC PNL TOTAL CA: CPT

## 2020-02-08 RX ADMIN — Medication 10 ML: at 08:58

## 2020-02-08 RX ADMIN — Medication 10 ML: at 21:40

## 2020-02-08 RX ADMIN — SACUBITRIL AND VALSARTAN 1 TABLET: 24; 26 TABLET, FILM COATED ORAL at 08:57

## 2020-02-08 RX ADMIN — SACUBITRIL AND VALSARTAN 1 TABLET: 24; 26 TABLET, FILM COATED ORAL at 21:40

## 2020-02-08 RX ADMIN — WARFARIN SODIUM 6 MG: 5 TABLET ORAL at 17:55

## 2020-02-08 NOTE — PROGRESS NOTES
INR 1.8. Hb 9.2. Feels better, no heavy breathing. BM without blood     Abdomen soft. Scrotum swollen. Diuretic/meds per cardiology. Coumadin ongoing. PT for discharge planning. Likely here through weekend.     Lilliam Vazquez MD

## 2020-02-08 NOTE — PROGRESS NOTES
Problem: Mobility Impaired (Adult and Pediatric)  Goal: *Acute Goals and Plan of Care (Insert Text)  Description  FUNCTIONAL STATUS PRIOR TO ADMISSION: Patient was independent and active without use of DME.    HOME SUPPORT PRIOR TO ADMISSION: The patient lived with wife but did not require assist.    Physical Therapy Goals  Initiated 2/7/2020  1. Patient will move from supine to sit and sit to supine , scoot up and down and roll side to side in bed with modified independence within 7 day(s). 2.  Patient will transfer from bed to chair and chair to bed with modified independence using the least restrictive device within 7 day(s). 3.  Patient will perform sit to stand with modified independence within 7 day(s). 4.  Patient will ambulate with modified independence for 150 feet with the least restrictive device within 7 day(s). 5.  Patient will ascend/descend 4 stairs with 1 handrail(s) with modified independence within 7 day(s). Note:   PHYSICAL THERAPY TREATMENT  Patient: Maria Dolores Temple (89 y.o. male)  Date: 2/8/2020  Diagnosis: Dehydration [E86.0]  Dehydration [E86.0]   <principal problem not specified>       Precautions:    Chart, physical therapy assessment, plan of care and goals were reviewed. ASSESSMENT  Patient continues with skilled PT services. Pt sit to stand with CGA. Pt feeling increased weakness standing initially. Pt ambulated only 10ft with slight unsteadiness. Pt was given a RW to stabilize gait. Pt ambulated 120ft with RW CGA with cues to maneuver turns. Pt left sitting. Pts HR 88 to 97 BPM with mobility and SPO2 was 97% and above on RA with mobility. Pt continues to be deconditioned and would benefit from short SNF stay for Rehab before going home. If not pt would need home health PT with supervision. Current Level of Function Impacting Discharge (mobility/balance): Pt needing RW to stabilize gait.          PLAN :  Patient continues to benefit from skilled intervention to address the above impairments. Continue treatment per established plan of care. to address goals. Recommendation for discharge: (in order for the patient to meet his/her long term goals)  Therapy up to 5 days/week in SNF setting or intensive home health therapy program    This discharge recommendation:  Has been made in collaboration with the attending provider and/or case management    IF patient discharges home will need the following DME: rolling walker       SUBJECTIVE:       OBJECTIVE DATA SUMMARY:   Critical Behavior:  Neurologic State: Alert  Orientation Level: Oriented X4  Cognition: Appropriate decision making, Appropriate for age attention/concentration, Appropriate safety awareness, Follows commands  Safety/Judgement: Awareness of environment, Fall prevention  Functional Mobility Training:                Transfers:  Sit to Stand: Contact guard assistance  Stand to Sit: Contact guard assistance                             Balance:     Ambulation/Gait Training:  Distance (ft): 120 Feet (ft)  Assistive Device: Gait belt;Walker, rolling  Ambulation - Level of Assistance: Contact guard assistance        Gait Abnormalities: Decreased step clearance        Base of Support: Narrowed     Speed/Krysta: Pace decreased (<100 feet/min)  Step Length: Right shortened;Left shortened               Activity Tolerance:   Fair  Please refer to the flowsheet for vital signs taken during this treatment.     After treatment patient left in no apparent distress:   Sitting in chair    COMMUNICATION/COLLABORATION:   The patients plan of care was discussed with: Physical Therapist    Nanette Stearns PTA   Time Calculation: 23 mins

## 2020-02-08 NOTE — PROGRESS NOTES
[address] Pharmacy Dosing Services: 02/08/20    Consult for Warfarin Dosing by Pharmacy by Shannan Norman NP  Consult provided for this 69 y.o.  male , for indication of Mechanical AVR  Day of Therapy: Resumed from PTA medication list  Home Regimen: Warfarin 2.5 mg PO nightly  Dose to achieve an INR goal of 2.5- 3.5    Assessment/Plan:  H/H improved, platelets stable, noted ongoing hematochezia throughout admission  Today's INR of 1.8 is sub-therapeutic for indication  Per cardiology note, hold off on bridge therapy for now due to hematochezia  Will order warfarin 6 mg x 1 dose tonight at 1800 (represents 20% dose increase from yesterday, has not shown much increase on 5 mg daily which is already double home dose of 2.5 mg)   Consider further increase tomorrow if INR remains same  INR ordered daily with AM labs per protocol    Significant drug interactions: None identified  Previous dose given 5 mg on 2/7   PT/INR Lab Results   Component Value Date/Time    INR 1.8 (H) 02/08/2020 03:02 AM      Platelets Lab Results   Component Value Date/Time    PLATELET 616 39/66/1118 03:02 AM      H/H Lab Results   Component Value Date/Time    HGB 9.2 (L) 02/08/2020 03:02 AM        Pharmacy to follow daily and will provide subsequent Warfarin dosing based on clinical status.   RYLIE Gold  Contact information [phone]

## 2020-02-08 NOTE — ROUTINE PROCESS
Verbal shift change report given to Mary Young (oncoming nurse) by Tamanna Barakat (offgoing nurse). Report included the following information SBAR, Kardex, MAR, Accordion and Recent Results.

## 2020-02-08 NOTE — PROGRESS NOTES
Bedside and Verbal shift change report given to Apurva Sharma (oncoming nurse) by Michela Ortiz (offgoing nurse). Report included the following information SBAR, Kardex, ED Summary and Recent Results.

## 2020-02-08 NOTE — PROGRESS NOTES
2-8-2020 CASE MANAGEMENT NOTE:  I met with the patient and his wife to continue discharge planning. The patient feels he could benefit from a short rehab stay and signed a choice letter for [facility]. I sent the referral to them thru [employer] and await their response. If accepted, they will need to get insurance authorization.  ERAN Matthesw, CM

## 2020-02-09 LAB
INR PPP: 2.2 (ref 0.9–1.1)
PROTHROMBIN TIME: 21.8 SEC (ref 9–11.1)

## 2020-02-09 PROCEDURE — 74011250637 HC RX REV CODE- 250/637: Performed by: SURGERY

## 2020-02-09 PROCEDURE — 36415 COLL VENOUS BLD VENIPUNCTURE: CPT

## 2020-02-09 PROCEDURE — 74011250637 HC RX REV CODE- 250/637: Performed by: NURSE PRACTITIONER

## 2020-02-09 PROCEDURE — 85610 PROTHROMBIN TIME: CPT

## 2020-02-09 PROCEDURE — 65660000000 HC RM CCU STEPDOWN

## 2020-02-09 PROCEDURE — 97110 THERAPEUTIC EXERCISES: CPT

## 2020-02-09 PROCEDURE — 97116 GAIT TRAINING THERAPY: CPT

## 2020-02-09 RX ADMIN — SACUBITRIL AND VALSARTAN 1 TABLET: 24; 26 TABLET, FILM COATED ORAL at 09:03

## 2020-02-09 RX ADMIN — Medication 10 ML: at 09:04

## 2020-02-09 RX ADMIN — SACUBITRIL AND VALSARTAN 1 TABLET: 24; 26 TABLET, FILM COATED ORAL at 21:42

## 2020-02-09 RX ADMIN — WARFARIN SODIUM 6 MG: 5 TABLET ORAL at 18:36

## 2020-02-09 RX ADMIN — ACETAMINOPHEN 650 MG: 325 TABLET ORAL at 05:37

## 2020-02-09 RX ADMIN — Medication 10 ML: at 21:42

## 2020-02-09 NOTE — PROGRESS NOTES
2:10 PM  CM informed by [facility] pt was not accepted for admission. Notified pt of denial and discussed either going home with home health or going to a SNF for short term rehab. Pt would like to think about whether or not he would like to try sending a referral to a SNF or go home. Gave pt a list of SNF options. Pt will discuss his plan with CM in the morning once he speaks to his spouse. If pt chooses a SNF, he will need authorization. If he decides to go home with home health, he will need a RW. Aren Fuchs.

## 2020-02-09 NOTE — PROGRESS NOTES
Problem: Patient Education: Go to Patient Education Activity  Goal: Patient/Family Education  Outcome: Progressing Towards Goal     Problem: Mobility Impaired (Adult and Pediatric)  Goal: *Acute Goals and Plan of Care (Insert Text)  Description  FUNCTIONAL STATUS PRIOR TO ADMISSION: Patient was independent and active without use of DME.    HOME SUPPORT PRIOR TO ADMISSION: The patient lived with wife but did not require assist.    Physical Therapy Goals  Initiated 2/7/2020  1. Patient will move from supine to sit and sit to supine , scoot up and down and roll side to side in bed with modified independence within 7 day(s). 2.  Patient will transfer from bed to chair and chair to bed with modified independence using the least restrictive device within 7 day(s). 3.  Patient will perform sit to stand with modified independence within 7 day(s). 4.  Patient will ambulate with modified independence for 150 feet with the least restrictive device within 7 day(s). 5.  Patient will ascend/descend 4 stairs with 1 handrail(s) with modified independence within 7 day(s). Outcome: Progressing Towards Goal   PHYSICAL THERAPY TREATMENT  Patient: Oma Arenas (78 y.o. male)  Date: 2/9/2020  Diagnosis: Dehydration [E86.0]  Dehydration [E86.0]   <principal problem not specified>       Precautions:  fall; pacemaker  Chart, physical therapy assessment, plan of care and goals were reviewed. ASSESSMENT  Patient continues with skilled PT services and is progressing towards goals. Patient progressing in ambulation distance however requires frequent rest breaks due to SOB and coughing. SATs remain 93-96% on room air during activity and HR between 76-88bpm.  Patient still requiring the RW for safety and balance due to flexed trunk and hips and will need one if discharging home. Patient was denied inpatient rehab and PT recommending HHPT vs SNF rehab.  Patient has a supportive and physically healthy wife to assist him should he choose to go home. Patient will need to complete stair training with his next visit in preparation for discharge. Current Level of Function Impacting Discharge (mobility/balance): CGA/min A 200' with RW (50+75+75)    Other factors to consider for discharge: PTA Indep Ambulator         PLAN :  Patient continues to benefit from skilled intervention to address the above impairments. Continue treatment per established plan of care. to address goals. Recommendation for discharge: (in order for the patient to meet his/her long term goals)  Therapy up to 5 days/week in SNF setting or intensive home health therapy program    This discharge recommendation:  Has been made in collaboration with the attending provider and/or case management    IF patient discharges home will need the following DME: rolling walker       SUBJECTIVE:   Patient stated     OBJECTIVE DATA SUMMARY:   Critical Behavior:  Neurologic State: Alert  Orientation Level: Oriented X4  Cognition: Appropriate decision making, Appropriate for age attention/concentration, Appropriate safety awareness, Follows commands  Safety/Judgement: Awareness of environment, Fall prevention  Functional Mobility Training:  Bed Mobility:   NT up to chair                 Transfers:  Sit to Stand: Contact guard assistance  Stand to Sit: Stand-by assistance                             Balance:  Sitting: Intact  Standing: Intact; With support  Ambulation/Gait Training:  Distance (ft): 200 Feet (ZW)(03+94+19)  Assistive Device: Gait belt;Walker, rolling  Ambulation - Level of Assistance: Contact guard assistance        Gait Abnormalities: Altered arm swing        Base of Support: Narrowed     Speed/Krysta: Pace decreased (<100 feet/min)(3-4 standing rest breaks needed )                      Stairs:did not perform. Need to next visit.               Therapeutic Exercises:   Standing at rail hip ABD x 10 each LE;   Pain Ratin/10    Activity Tolerance:   Good and Fair  Please refer to the flowsheet for vital signs taken during this treatment.     After treatment patient left in no apparent distress:   Sitting in chair    COMMUNICATION/COLLABORATION:   The patients plan of care was discussed with: Registered Nurse and     Almita Mueller, DPT   Time Calculation: 31 mins

## 2020-02-09 NOTE — PROGRESS NOTES
[facility] Dosing Services: 02/09/20    Consult for Warfarin Dosing by Pharmacy by Israel Camacho NP  Consult provided for this 69 y.o.  male , for indication of Mechanical AVR  Day of Therapy: Resumed from PTA medication list  Home Regimen: Warfarin 2.5 mg PO nightly  Dose to achieve an INR goal of 2.5- 3.5    Assessment/Plan:  H/H improved, platelets stable, noted ongoing hematochezia throughout admission  Today's INR of 2.2 is sub-therapeutic for indication  Per cardiology note, hold off on bridge therapy for now due to hematochezia  Will order warfarin 6 mg x 1 dose tonight at 1800 (represents 20% dose increase from recent dosing, had not shown much increase on 5 mg daily which is already double home dose of 2.5 mg)   INR ordered daily with AM labs per protocol    Significant drug interactions: None identified  Previous dose given 6 mg on 2/8   PT/INR Lab Results   Component Value Date/Time    INR 2.2 (H) 02/09/2020 05:49 AM      Platelets Lab Results   Component Value Date/Time    PLATELET 022 72/37/6238 03:02 AM      H/H Lab Results   Component Value Date/Time    HGB 9.2 (L) 02/08/2020 03:02 AM        Pharmacy to follow daily and will provide subsequent Warfarin dosing based on clinical status.   K[facility] Cobian)  Contact information [phone]

## 2020-02-10 ENCOUNTER — HOME HEALTH ADMISSION (OUTPATIENT)
Dept: HOME HEALTH SERVICES | Facility: HOME HEALTH | Age: 75
End: 2020-02-10
Payer: MEDICARE

## 2020-02-10 VITALS
DIASTOLIC BLOOD PRESSURE: 70 MMHG | RESPIRATION RATE: 18 BRPM | WEIGHT: 200.18 LBS | SYSTOLIC BLOOD PRESSURE: 107 MMHG | HEART RATE: 80 BPM | HEIGHT: 72 IN | BODY MASS INDEX: 27.11 KG/M2 | TEMPERATURE: 98.3 F | OXYGEN SATURATION: 96 %

## 2020-02-10 LAB
ANION GAP SERPL CALC-SCNC: 6 MMOL/L (ref 5–15)
BUN SERPL-MCNC: 20 MG/DL (ref 6–20)
BUN/CREAT SERPL: 24 (ref 12–20)
CALCIUM SERPL-MCNC: 8.3 MG/DL (ref 8.5–10.1)
CHLORIDE SERPL-SCNC: 105 MMOL/L (ref 97–108)
CO2 SERPL-SCNC: 27 MMOL/L (ref 21–32)
CREAT SERPL-MCNC: 0.84 MG/DL (ref 0.7–1.3)
ERYTHROCYTE [DISTWIDTH] IN BLOOD BY AUTOMATED COUNT: 16.2 % (ref 11.5–14.5)
GLUCOSE SERPL-MCNC: 99 MG/DL (ref 65–100)
HCT VFR BLD AUTO: 33.2 % (ref 36.6–50.3)
HGB BLD-MCNC: 10.5 G/DL (ref 12.1–17)
INR PPP: 2.5 (ref 0.9–1.1)
MAGNESIUM SERPL-MCNC: 2 MG/DL (ref 1.6–2.4)
MCH RBC QN AUTO: 29.4 PG (ref 26–34)
MCHC RBC AUTO-ENTMCNC: 31.6 G/DL (ref 30–36.5)
MCV RBC AUTO: 93 FL (ref 80–99)
NRBC # BLD: 0 K/UL (ref 0–0.01)
NRBC BLD-RTO: 0 PER 100 WBC
PLATELET # BLD AUTO: 311 K/UL (ref 150–400)
PMV BLD AUTO: 8.9 FL (ref 8.9–12.9)
POTASSIUM SERPL-SCNC: 4.3 MMOL/L (ref 3.5–5.1)
PROTHROMBIN TIME: 24.3 SEC (ref 9–11.1)
RBC # BLD AUTO: 3.57 M/UL (ref 4.1–5.7)
SODIUM SERPL-SCNC: 138 MMOL/L (ref 136–145)
WBC # BLD AUTO: 9.2 K/UL (ref 4.1–11.1)

## 2020-02-10 PROCEDURE — 97530 THERAPEUTIC ACTIVITIES: CPT

## 2020-02-10 PROCEDURE — 97116 GAIT TRAINING THERAPY: CPT

## 2020-02-10 PROCEDURE — 36415 COLL VENOUS BLD VENIPUNCTURE: CPT

## 2020-02-10 PROCEDURE — 74011250637 HC RX REV CODE- 250/637: Performed by: NURSE PRACTITIONER

## 2020-02-10 PROCEDURE — 85610 PROTHROMBIN TIME: CPT

## 2020-02-10 PROCEDURE — 85027 COMPLETE CBC AUTOMATED: CPT

## 2020-02-10 PROCEDURE — 83735 ASSAY OF MAGNESIUM: CPT

## 2020-02-10 PROCEDURE — 80048 BASIC METABOLIC PNL TOTAL CA: CPT

## 2020-02-10 RX ORDER — WARFARIN 2.5 MG/1
5 TABLET ORAL
Qty: 60 TAB | Refills: 0 | Status: SHIPPED | OUTPATIENT
Start: 2020-02-10 | End: 2020-07-19

## 2020-02-10 RX ORDER — FUROSEMIDE 40 MG/1
80 TABLET ORAL AS NEEDED
Status: DISCONTINUED | OUTPATIENT
Start: 2020-02-10 | End: 2020-02-10

## 2020-02-10 RX ADMIN — Medication 10 ML: at 05:08

## 2020-02-10 RX ADMIN — SACUBITRIL AND VALSARTAN 1 TABLET: 24; 26 TABLET, FILM COATED ORAL at 08:43

## 2020-02-10 NOTE — PROGRESS NOTES
Cardiology Progress Note         NAME:  Leila Cazares   :   1945   MRN:   [id number]     Assessment/Plan:   HFrEF -chronic- weight improved/breathing stable; On low dose Entresto; not on abiel 2/2 hypotension. LVEF 45-50% by Echo. Pt states prev intolerant to BB (unsure of reaction) - will arrange close f/u with Dr. Christelle Ayala for further med optimization    Mechanical AVR - continue home coumadin - discharge dose adjusted to 5mg/d per pharmacy, INR at goal, 2.5. Resume aspirin 81mg/d. INR daily until f/u; has home machine.     Paroxysmal Afib w BiV ICD - coumadin per above. V paced rhythm 70s      HTN - home entresto 49/51 BID, spironolactone 25mg BID held 2/2 hypotension. BP now improved/stable; on low dose entresto        S/p hernia repair with postoperative anemia - s/p PRBCs this admission. Hgb stable      HLD - on pravastatin 40mg daily    Hematochezia - improved    OK for discharge from cardiology standpoint. Follow-up Information     Follow up With Specialties Details Why Contact Info    Ray Lino MD Internal Medicine On 2020 at 12:20pm [address] [place] [address]  [phone]                   Agree with advanced NP's history, exam and  A/P with changes/additons. Will see PRN. Follow-up with primary cardiologist at [facility]. Salvador Slater MD, [facility] [place]      Subjective:     Pt feels well today and ready to go home. Continues to have very mild dry, cough which is infrequent. Had BM this morning; no blood that he noticed. .   Breathing stable. No significant leg swelling. Abd feels softer. N/v improved. Scrotal swelling ongoing but improved. Much better than last week. Previously tried Coreg as well as Toprol; states he didn't tolerate but does not recall why.   Prefers to hold off and discuss with Dr. Christelle Ayala.       Cardiac ROS: Patient denies any chest pain, dyspnea, palpitations, syncope, edema, or paroxysmal nocturnal dyspnea. Daily weights obtained by \"bed weight\". Requested standing weight prior to dc. Will also get updated labs  including BMP, CBC, and Mg. BMP 2/8/20 hemolyzed. Previous Cardiac Eval  EKG 11/27/17 - V paced, PVC's     ABELARDO/DCC 8/11/17 - LVEF 50-55%; Severe RV dilation and mod dysfunction. PFO with mod shunting. Normal mechanical AVR (mean gradient 4 mmHg), mod MR. DCCV 8/11/17-> NSR      ECHO 8/30/17 - TDS. Limited study. LVEF 50-55%, Mod RV dilation with RV dysfunction, CHARLIE, RVSP 37   ECHO 11/29/2017: EF 55-60%, now WMA, mild reduced RVEF, mod LAE, mild CHARLIE, mild-mod MR, mechanical AVR- trivial AI, mild TR, RVSP 36,   AV Vmax was 1.4 m/s.  AV maxPG was 7.9 mmHg.     CT Head 11/27/17 - no acute abn   Carotid Doppler 11/28/17 - 0-49% stenosis bilat     [facility] records reviewed 2/4/2020: followed by Dr Thierno David (HF clinic)   Permanent a fib  CHF   EF 30-35%    s/p mechanical AVR [place] 2004 2012 medtronic Claria MRI CRT-D bi V ICD. V pacing 91% 1/15/2020 device function nml  Chronic systolic CHF:   ECHO 27/1646: AVR nml function, Mild MR, TR, LVEF 30-35% , RV function mod reduced.      4/24/19 RHC: RA 11/8, RV 30/8, PA30/13/19, PCW 15 CI 1.48           02/02/20   ECHO ADULT COMPLETE 02/04/2020 2/4/2020    Narrative · Normal cavity size. Upper normal wall thickness. Mild systolic   dysfunction. Estimated left ventricular ejection fraction is 45 - 50%. Abnormal left ventricular septal motion consistent with right ventricular   pacing. Inconclusive left ventricular diastolic function. · Prosthetic aortic valve. There is a unknown type of mechanical aortic   valve. Prosthesis is normal. Aortic Valve Systolic Mean Gradient 3.8FG Hg  · Trace mitral valve regurgitation is present. · Mild tricuspid valve regurgitation is present. · Pacer/ICD present.         Signed by: Jerry Guardado MD     .  Objective:     Visit Vitals  /70 (BP 1 Location: Right arm, BP Patient Position: At rest)   Pulse 80 Temp 98.3 °F (36.8 °C)   Resp 18   Ht 6' (1.829 m)   Wt 92.1 kg (203 lb)   SpO2 96%   BMI 27.53 kg/m²      O2 Device: Room air     Wt Readings from Last 3 Encounters:   02/10/20 92.1 kg (203 lb)   20 86.6 kg (191 lb)   20 90.4 kg (199 lb 6 oz)     Temp (24hrs), Av °F (36.7 °C), Min:96.5 °F (35.8 °C), Max:98.8 °F (37.1 °C)    No intake/output data recorded.  1901 - 02/10 0700  In: -   Out: 400 [Urine:400]     General - well developed well nourished  Neck-no JVD  CVS-S1-S2 present, 2/6 systolic murmur present; sys click +  RS-   CTAB, no crackles or wheezing      Abdomen-  protuberant, soft, non-tender, +BS  LE-   no MINAL, warm, well perfused, +scrotal swelling  Neuro - nonfocal  Psych - normal mood and affect    Additional comments: None    Care Plan discussed with:    Comments   Patient x    Family      RN x    Care Manager                    Consultant:          Data Review:     Results for Emelyn Otoole (MRN [id number]) as of 2020 10:50   Ref. Range 2017 00:47 2020 00:18   NT pro-BNP Latest Ref Range: <125 PG/ML 1,815 (H) 5,067 (H)       No lab exists for component: ITNL   No results for input(s): CPK, CKMB, TROIQ in the last 72 hours. Recent Labs     20  0302      K 5.0      CO2 22   BUN 26*   CREA 0.91   GLU 97   MG 2.1   WBC 10.8   HGB 9.2*   HCT 28.8*        Recent Labs     02/10/20  0440 20  0549 20  0302   INR 2.5* 2.2* 1.8*   PTP 24.3* 21.8* 18.2*     XR Results (most recent):  Results from Hospital Encounter encounter on 20   XR CHEST PORT    Narrative EXAM: XR CHEST PORT    INDICATION: persistent cough; questionable vol status    COMPARISON: 2020    FINDINGS: A portable AP radiograph of the chest was obtained at 1322 hours. The  patient is on a cardiac monitor. There is mild cardiomegaly in this patient status post median sternotomy and  aortic valve replacement. ICD remains in place.     There is pneumoperitoneum which appears to have decreased. There is increasing opacity at the left lung base consistent left pleural  effusion left lower lobe atelectasis. No pulmonary edema no pneumonia. Impression IMPRESSION:  1. Increasing opacity at the left lung base consistent with small left pleural  effusion and left lower lobe atelectasis. Pneumoperitoneum is noted.  This has  decreased     Medications reviewed  Current Facility-Administered Medications   Medication Dose Route Frequency    sacubitril-valsartan (ENTRESTO) 24-26 mg tablet 1 Tab  1 Tab Oral Q12H    ondansetron (ZOFRAN ODT) tablet 4 mg  4 mg Oral Q8H PRN    diphenhydrAMINE (BENADRYL) capsule 25 mg  25 mg Oral Q6H PRN    Warfarin- Pharmacy Dosing    Other Rx Dosing/Monitoring    0.9% sodium chloride infusion 250 mL  250 mL IntraVENous PRN    0.9% sodium chloride infusion 250 mL  250 mL IntraVENous PRN    sodium chloride (NS) flush 5-40 mL  5-40 mL IntraVENous Q8H    sodium chloride (NS) flush 5-40 mL  5-40 mL IntraVENous PRN    acetaminophen (TYLENOL) tablet 650 mg  650 mg Oral Q6H PRN    oxyCODONE IR (ROXICODONE) tablet 5 mg  5 mg Oral Q4H PRN    oxyCODONE IR (ROXICODONE) tablet 10 mg  10 mg Oral Q4H PRN         Michele Ross NP

## 2020-02-10 NOTE — PROGRESS NOTES
2- CASE MANAGEMENT NOTE:  Order for home health PT noted. The patient signed a choice letter for  [address] [place], [place] [address] (placed on his chart), the referral was sent thru [facility] and accepted. He signed the second Medicare letter, was given a copy and the original was placed on his chart. No further discharge needs anticipated.  ERAN Matthews, CM

## 2020-02-10 NOTE — PROGRESS NOTES
1930  Bedside and Verbal shift change report given to Josse Donnelly (oncoming nurse) by Sandeep Hall (offgoing nurse). Report included the following information SBAR, Kardex, ED Summary and Recent Results.

## 2020-02-10 NOTE — PROGRESS NOTES
Problem: Falls - Risk of  Goal: *Absence of Falls  Description  Document Clide Failing Fall Risk and appropriate interventions in the flowsheet.   Note: Fall Risk Interventions:  Mobility Interventions: Patient to call before getting OOB         Medication Interventions: Teach patient to arise slowly    Elimination Interventions: Bed/chair exit alarm, Call light in reach, Patient to call for help with toileting needs

## 2020-02-10 NOTE — PROGRESS NOTES
Nutrition Assessment:    RECOMMENDATIONS/INTERVENTION(S):   1. Continue regular diet. 2. D/c Ensure Enlive BID per pt preference as he is planning on being d/c'd today. 3. Continue to monitor intakes, wt changes, labs. ASSESSMENT:   2/10: F/u. Pt reports normal appetite. Says he is eating about 50% of meals. For breakfast he ate raisin bran, an orange, and OJ. Likes Ensure [place]  But says they are stockpiling because he isn't drinking them every time he gets them. Pt says he will continue to drink nutrition supplements at home since he typically doesn't eat very much. No c/o N/V. Says he had a BM this AM with a little blood in it but that is may be hemorrhoidal. Will continue to monitor intakes. Labs and meds reviewed. 2/5: F/u. Diet advanced to regular yesterday at lunch. Pt continues with poor po intake. Per wife, pt typically only eating a few bites of meals. Breakfast tray in room, some of fruit eaten, drank juice. Pt says he didn't have his dentures in so it was hard to eat any more of the fruit or the english muffin. Says he will wear his dentures for following meals. Pt says he has not been receiving Ensure Clear, will d/c. Pt would like to receive Ensure Enlive. Has tried in the past and likes it, will send BID. No c/o N/V. Labs- Ca 8.1, Bg 7.3, -131-152. 2/3: Pt assessed for MST>2 for wt loss. Admitted with dehydration. PMH includes TIA, CHF, CVA, CHF. BMI 25.9, WNL for age. Per chart, pt has not been eating/drinking much since hernia surgery 4 days ago. Pt reports poor appetite and po intake x1 week. Says he ate about 50% of broth, jello, italian ice, and 7-up for lunch today. Per wife, pt isn't a big eater anyway but has been eating less for the past week. Pt says he hasn't been able to taste food as well for several months, attributes it to medication. Pt reports UBW ~198#, # in chart. Wife and pt both say they have noticed some wt loss, though not sure how much.  Recorded wts show 4% wt loss x1 month which is not considered significant for time frame but noted. Pt agreeable to trying Ensure Clear while on CLD. No c/o N/V. Labs- Ca 7.4, -165. Meds- LR @125mL/h. Diet Order: Regular  % Eaten:    No data found. Pertinent Medications: [x] Reviewed    Labs: [x] Reviewed    Anthropometrics: Height: 6' (182.9 cm) Weight: 90.8 kg (200 lb 2.8 oz)    IBW (%IBW):   ( ) UBW (%UBW):   (  %)      BMI: Body mass index is 27.15 kg/m². This BMI is indicative of:   [] Underweight    [x] Normal    [] Overweight    []  Obesity    []  Extreme Obesity (BMI>40)  Estimated Nutrition Needs (Based on): 2151 Kcals/day(1654 x 1.3 AF) , 86 g(0.8-1 g/kg) Protein  Carbohydrate: At Least 130 g/day  Fluids: 2151 mL/day (1 ml/kcal)    Last BM: 2/10  [x]Active     []Hyperactive  []Hypoactive       [] Absent   BS  Skin:    [x] Intact   [] Incision  [] Breakdown   [] DTI   [] Tears/Excoriation/Abrasion  []Edema [] Other: Wt Readings from Last 30 Encounters:   02/10/20 90.8 kg (200 lb 2.8 oz)   01/30/20 86.6 kg (191 lb)   01/02/20 90.4 kg (199 lb 6 oz)   12/12/19 119.3 kg (263 lb)   12/13/17 88.5 kg (195 lb)   12/04/17 91.2 kg (201 lb)   12/01/17 88.2 kg (194 lb 7.1 oz)      NUTRITION DIAGNOSES:   Problem:  Inadequate oral intake     Etiology: related to decreased ability to consume sufficient po intake     Signs/Symptoms: as evidenced by pt reports poor appetite and po intake x1 week, currently on CLD      2/5: Nutrition dx continues, poor po intake of meals. 2/10: Nutrition dx continues, fair po intake of meals.     NUTRITION INTERVENTIONS:  Meals/Snacks: General/healthful diet   Supplements: Commercial supplement              GOAL:   PO intake >50% meals + ONS next 3-5 days    Cultural, Rastafarian, or Ethnic Dietary Needs: None     EDUCATION & DISCHARGE NEEDS:    [x] None Identified   [] Identified and Education Provided/Documented   [] Identified and Pt declined/was not appropriate      [] Interdisciplinary Care Plan Reviewed/Documented    [x] Discharge Needs: Regular diet   [] No Nutrition Related Discharge Needs    NUTRITION RISK:   Pt Is At Nutrition Risk  [x]     No Nutrition Risk Identified  []       PT SEEN FOR:    []  MD Consult: []Calorie Count      []Diabetic Diet Education        []Diet Education     []Electrolyte Management     []General Nutrition Management and Supplements     []Management of Tube Feeding     []TPN Recommendations    []  RN Referral:  []MST score >=2     []Enteral/Parenteral Nutrition PTA     []Pregnant: Gestational DM or Multigestation                 [] Pressure Ulcer    []  Low BMI      []  Length of Stay       [] Dysphagia Diet         [] Ventilator  [x]  Follow-up     Previous Recommendations:   [x] Implemented          [] Not Implemented          [] Not Applicable    Previous Goal:   [] Met              [x] Progressing Towards Goal              [] Not Progressing Towards Goal   [] Not Applicable            Akhil Wu  Pager [phone]  Phone [phone]

## 2020-02-10 NOTE — PROGRESS NOTES
Discharge instructions reviewed with the patient and his wife and all questions answered. Peripheral IV removed. Patient to be discharged home via wheelchair with family.

## 2020-02-10 NOTE — PROGRESS NOTES
2- CASE MANAGEMENT NOTE:  I met with the patient and wife and he feels he has greatly improved and wants to return home with home health PT. He was able to borrow a rollator from a friend and PT, Coco Prajapati approved this.  I will send the home health referral when it is written by the MD. Sam Auguste, VISHALW, CM

## 2020-02-10 NOTE — DISCHARGE INSTRUCTIONS
Patient Education     Your spironolactone was discontinued for now and your Entresto dose was decreased. I have sent a new prescription to your pharmacy. Your new Warfarin dose is 5mg per day. Please have repeat INR check on Thursday when you see Dr. Tevin King. If you have abnormal bleeding at home, call your provider. Avoiding Triggers With Heart Failure: Care Instructions  Your Care Instructions    Triggers are anything that make your heart failure flare up. A flare-up is also called \"sudden heart failure\" or \"acute heart failure. \" When you have a flare-up, fluid builds up in your lungs, and you have problems breathing. You might need to go to the hospital. By watching for changes in your condition and avoiding triggers, you can prevent heart failure flare-ups. Follow-up care is a key part of your treatment and safety. Be sure to make and go to all appointments, and call your doctor if you are having problems. It's also a good idea to know your test results and keep a list of the medicines you take. How can you care for yourself at home? Watch for changes in your weight and condition  · Weigh yourself without clothing at the same time each day. Record your weight. Call your doctor if you have sudden weight gain, such as more than 2 to 3 pounds in a day or 5 pounds in a week. (Your doctor may suggest a different range of weight gain.) A sudden weight gain may mean that your heart failure is getting worse. · Keep a daily record of your symptoms. Write down any changes in how you feel, such as new shortness of breath, cough, or problems eating. Also record if your ankles are more swollen than usual and if you feel more tired than usual. Note anything that you ate or did that could have triggered these changes. Limit sodium  Sodium causes your body to hold on to extra water. This may cause your heart failure symptoms to get worse. People get most of their sodium from processed foods.  Fast food and restaurant meals also tend to be very high in sodium. · Your doctor may suggest that you limit sodium to 2,000 milligrams (mg) a day or less. That is less than 1 teaspoon of salt a day, including all the salt you eat in cooking or in packaged foods. · Read food labels on cans and food packages. They tell you how much sodium you get in one serving. Check the serving size. If you eat more than one serving, you are getting more sodium. · Be aware that sodium can come in forms other than salt, including monosodium glutamate (MSG), sodium citrate, and sodium bicarbonate (baking soda). MSG is often added to Asian food. You can sometimes ask for food without MSG or salt. · Slowly reducing salt will help you adjust to the taste. Take the salt shaker off the table. · Flavor your food with garlic, lemon juice, onion, vinegar, herbs, and spices instead of salt. Do not use soy sauce, steak sauce, onion salt, garlic salt, mustard, or ketchup on your food, unless it is labeled \"low-sodium\" or \"low-salt. \"  · Make your own salad dressings, sauces, and ketchup without adding salt. · Use fresh or frozen ingredients, instead of canned ones, whenever you can. Choose low-sodium canned goods. · Eat less processed food and food from restaurants, including fast food. Exercise as directed  Moderate, regular exercise is very good for your heart. It improves your blood flow and helps control your weight. But too much exercise can stress your heart and cause a heart failure flare-up. · Check with your doctor before you start an exercise program.  · Walking is an easy way to get exercise. Start out slowly. Gradually increase the length and pace of your walk. Swimming, riding a bike, and using a treadmill are also good forms of exercise. · When you exercise, watch for signs that your heart is working too hard. You are pushing yourself too hard if you cannot talk while you are exercising.  If you become short of breath or dizzy or have chest pain, stop, sit down, and rest.  · Do not exercise when you do not feel well. Take medicines correctly  · Take your medicines exactly as prescribed. Call your doctor if you think you are having a problem with your medicine. · Make a list of all the medicines you take. Include those prescribed to you by other doctors and any over-the-counter medicines, vitamins, or supplements you take. Take this list with you when you go to any doctor. · Take your medicines at the same time every day. It may help you to post a list of all the medicines you take every day and what time of day you take them. · Make taking your medicine as simple as you can. Plan times to take your medicines when you are doing other things, such as eating a meal or getting ready for bed. This will make it easier to remember to take your medicines. · Get organized. Use helpful tools, such as daily or weekly pill containers. When should you call for help? Call 911 if you have symptoms of sudden heart failure such as:    · You have severe trouble breathing.     · You cough up pink, foamy mucus.     · You have a new irregular or rapid heartbeat.    Call your doctor now or seek immediate medical care if:    · You have new or increased shortness of breath.     · You are dizzy or lightheaded, or you feel like you may faint.     · You have sudden weight gain, such as more than 2 to 3 pounds in a day or 5 pounds in a week. (Your doctor may suggest a different range of weight gain.)     · You have increased swelling in your legs, ankles, or feet.     · You are suddenly so tired or weak that you cannot do your usual activities.    Watch closely for changes in your health, and be sure to contact your doctor if you develop new symptoms. Where can you learn more? Go to http://linden-carlton.info/. Enter O611 in the search box to learn more about \"Avoiding Triggers With Heart Failure: Care Instructions. \"  Current as of: April 9, 2019  Content Version: 12.2  © 7640-9037 [employer]. Care instructions adapted under license by [employer] (which disclaims liability or warranty for this information). If you have questions about a medical condition or this instruction, always ask your healthcare professional. [address] any warranty or liability for your use of this information.

## 2020-02-10 NOTE — PROGRESS NOTES
RN spoke to Dr. Bre Christine is regards to this patient's discharge medications.  Per Dr. Princess Oneal, patient is to resume all meds with no changes, except for those (Entresto, warfarin, spironolactone) explicitly listed in the AVS to \"start, change, or stop\"

## 2020-02-11 ENCOUNTER — HOME CARE VISIT (OUTPATIENT)
Dept: SCHEDULING | Facility: HOME HEALTH | Age: 75
End: 2020-02-11
Payer: MEDICARE

## 2020-02-11 VITALS
HEART RATE: 72 BPM | SYSTOLIC BLOOD PRESSURE: 118 MMHG | OXYGEN SATURATION: 96 % | RESPIRATION RATE: 16 BRPM | TEMPERATURE: 97.2 F | DIASTOLIC BLOOD PRESSURE: 60 MMHG

## 2020-02-11 PROCEDURE — 400013 HH SOC

## 2020-02-11 PROCEDURE — G0151 HHCP-SERV OF PT,EA 15 MIN: HCPCS

## 2020-02-11 NOTE — DISCHARGE SUMMARY
Discharge Summary    Patient: Leila Cazares               Sex: male          DOA: 2/2/2020  7:42 PM       YOB: 1945      Age:  69 y.o.        LOS:  LOS: 7 days                Discharge Date:  2/10/2020    Admission Diagnoses: Dehydration [E86.0]  Dehydration [E86.0]   Blood Loss anemia     Discharge Diagnoses:  Same    Procedure:  n/a    Discharge Condition: Good    Hospital Course: After [facility], re-hydration and slow return to therapeutic anticoagulation, patient stabilized. Discharge to home in stable condition. Consults: Cardiology. Significant Diagnostic Studies: See full electronic record. Discharge Medications:   See full electronic record. Activity/Diet/Wound Care: See patient administered discharge instructions.     Follow-up: 2 weeks    Anh Viera MD  [facility]  Office:  [phone]  Fax:  [phone]

## 2020-02-18 ENCOUNTER — HOME CARE VISIT (OUTPATIENT)
Dept: SCHEDULING | Facility: HOME HEALTH | Age: 75
End: 2020-02-18
Payer: MEDICARE

## 2020-02-21 ENCOUNTER — HOME CARE VISIT (OUTPATIENT)
Dept: SCHEDULING | Facility: HOME HEALTH | Age: 75
End: 2020-02-21
Payer: MEDICARE

## 2020-02-21 VITALS
TEMPERATURE: 97.4 F | RESPIRATION RATE: 16 BRPM | HEART RATE: 90 BPM | DIASTOLIC BLOOD PRESSURE: 50 MMHG | SYSTOLIC BLOOD PRESSURE: 100 MMHG | OXYGEN SATURATION: 99 %

## 2020-02-21 PROCEDURE — G0151 HHCP-SERV OF PT,EA 15 MIN: HCPCS

## 2020-02-26 ENCOUNTER — HOME CARE VISIT (OUTPATIENT)
Dept: SCHEDULING | Facility: HOME HEALTH | Age: 75
End: 2020-02-26
Payer: MEDICARE

## 2020-02-26 VITALS
RESPIRATION RATE: 16 BRPM | SYSTOLIC BLOOD PRESSURE: 120 MMHG | OXYGEN SATURATION: 98 % | TEMPERATURE: 96.8 F | DIASTOLIC BLOOD PRESSURE: 78 MMHG | HEART RATE: 78 BPM

## 2020-02-26 PROCEDURE — G0151 HHCP-SERV OF PT,EA 15 MIN: HCPCS

## 2020-07-19 ENCOUNTER — APPOINTMENT (OUTPATIENT)
Dept: GENERAL RADIOLOGY | Age: 75
DRG: 274 | End: 2020-07-19
Attending: EMERGENCY MEDICINE
Payer: MEDICARE

## 2020-07-19 ENCOUNTER — HOSPITAL ENCOUNTER (INPATIENT)
Age: 75
LOS: 5 days | Discharge: HOME | DRG: 274 | End: 2020-07-24
Attending: EMERGENCY MEDICINE | Admitting: INTERNAL MEDICINE
Payer: MEDICARE

## 2020-07-19 DIAGNOSIS — I47.20 V-TACH: ICD-10-CM

## 2020-07-19 DIAGNOSIS — I95.89 OTHER SPECIFIED HYPOTENSION: Primary | ICD-10-CM

## 2020-07-19 DIAGNOSIS — N17.9 ACUTE KIDNEY INJURY (HCC): ICD-10-CM

## 2020-07-19 DIAGNOSIS — R00.2 PALPITATIONS: ICD-10-CM

## 2020-07-19 DIAGNOSIS — R00.0 TACHYCARDIA: ICD-10-CM

## 2020-07-19 DIAGNOSIS — I49.5 TACHY-BRADY SYNDROME (HCC): ICD-10-CM

## 2020-07-19 PROBLEM — E86.0 DEHYDRATION: Status: RESOLVED | Noted: 2020-02-02 | Resolved: 2020-07-19

## 2020-07-19 PROBLEM — I95.9 HYPOTENSION: Status: ACTIVE | Noted: 2020-07-19

## 2020-07-19 LAB
ALBUMIN SERPL-MCNC: 3.9 G/DL (ref 3.5–5)
ALBUMIN/GLOB SERPL: 1.3 {RATIO} (ref 1.1–2.2)
ALP SERPL-CCNC: 86 U/L (ref 45–117)
ALT SERPL-CCNC: 55 U/L (ref 12–78)
ANION GAP SERPL CALC-SCNC: 6 MMOL/L (ref 5–15)
APPEARANCE UR: CLEAR
AST SERPL-CCNC: 49 U/L (ref 15–37)
BACTERIA URNS QL MICRO: NEGATIVE /HPF
BASOPHILS # BLD: 0 K/UL (ref 0–0.1)
BASOPHILS NFR BLD: 1 % (ref 0–1)
BILIRUB SERPL-MCNC: 1 MG/DL (ref 0.2–1)
BILIRUB UR QL: NEGATIVE
BNP SERPL-MCNC: 3724 PG/ML
BUN SERPL-MCNC: 19 MG/DL (ref 6–20)
BUN/CREAT SERPL: 14 (ref 12–20)
CALCIUM SERPL-MCNC: 8.8 MG/DL (ref 8.5–10.1)
CHLORIDE SERPL-SCNC: 109 MMOL/L (ref 97–108)
CO2 SERPL-SCNC: 24 MMOL/L (ref 21–32)
COLOR UR: NORMAL
COMMENT, HOLDF: NORMAL
CREAT SERPL-MCNC: 1.34 MG/DL (ref 0.7–1.3)
D DIMER PPP FEU-MCNC: 0.7 MG/L FEU (ref 0–0.65)
DIFFERENTIAL METHOD BLD: ABNORMAL
EOSINOPHIL # BLD: 0.2 K/UL (ref 0–0.4)
EOSINOPHIL NFR BLD: 3 % (ref 0–7)
EPITH CASTS URNS QL MICRO: NORMAL /LPF
ERYTHROCYTE [DISTWIDTH] IN BLOOD BY AUTOMATED COUNT: 14.7 % (ref 11.5–14.5)
GLOBULIN SER CALC-MCNC: 3.1 G/DL (ref 2–4)
GLUCOSE SERPL-MCNC: 139 MG/DL (ref 65–100)
GLUCOSE UR STRIP.AUTO-MCNC: NEGATIVE MG/DL
HCT VFR BLD AUTO: 41.2 % (ref 36.6–50.3)
HGB BLD-MCNC: 13.4 G/DL (ref 12.1–17)
HGB UR QL STRIP: NEGATIVE
HYALINE CASTS URNS QL MICRO: NORMAL /LPF (ref 0–5)
IMM GRANULOCYTES # BLD AUTO: 0 K/UL (ref 0–0.04)
IMM GRANULOCYTES NFR BLD AUTO: 0 % (ref 0–0.5)
INR PPP: 3.6 (ref 0.9–1.1)
KETONES UR QL STRIP.AUTO: NEGATIVE MG/DL
LEUKOCYTE ESTERASE UR QL STRIP.AUTO: NEGATIVE
LYMPHOCYTES # BLD: 1.6 K/UL (ref 0.8–3.5)
LYMPHOCYTES NFR BLD: 30 % (ref 12–49)
MAGNESIUM SERPL-MCNC: 2 MG/DL (ref 1.6–2.4)
MCH RBC QN AUTO: 30.1 PG (ref 26–34)
MCHC RBC AUTO-ENTMCNC: 32.5 G/DL (ref 30–36.5)
MCV RBC AUTO: 92.6 FL (ref 80–99)
MONOCYTES # BLD: 0.4 K/UL (ref 0–1)
MONOCYTES NFR BLD: 7 % (ref 5–13)
NEUTS SEG # BLD: 3.2 K/UL (ref 1.8–8)
NEUTS SEG NFR BLD: 59 % (ref 32–75)
NITRITE UR QL STRIP.AUTO: NEGATIVE
NRBC # BLD: 0 K/UL (ref 0–0.01)
NRBC BLD-RTO: 0 PER 100 WBC
PH UR STRIP: 6 [PH] (ref 5–8)
PLATELET # BLD AUTO: 183 K/UL (ref 150–400)
PMV BLD AUTO: 10.4 FL (ref 8.9–12.9)
POTASSIUM SERPL-SCNC: 4.2 MMOL/L (ref 3.5–5.1)
PROT SERPL-MCNC: 7 G/DL (ref 6.4–8.2)
PROT UR STRIP-MCNC: NEGATIVE MG/DL
PROTHROMBIN TIME: 33.4 SEC (ref 9–11.1)
RBC # BLD AUTO: 4.45 M/UL (ref 4.1–5.7)
RBC #/AREA URNS HPF: NORMAL /HPF (ref 0–5)
SAMPLES BEING HELD,HOLD: NORMAL
SODIUM SERPL-SCNC: 139 MMOL/L (ref 136–145)
SP GR UR REFRACTOMETRY: 1.01 (ref 1–1.03)
TROPONIN I SERPL-MCNC: 0.05 NG/ML
TSH SERPL DL<=0.05 MIU/L-ACNC: 4.1 UIU/ML (ref 0.36–3.74)
UA: UC IF INDICATED,UAUC: NORMAL
UROBILINOGEN UR QL STRIP.AUTO: 0.2 EU/DL (ref 0.2–1)
WBC # BLD AUTO: 5.4 K/UL (ref 4.1–11.1)
WBC URNS QL MICRO: NORMAL /HPF (ref 0–4)

## 2020-07-19 PROCEDURE — 65610000006 HC RM INTENSIVE CARE

## 2020-07-19 PROCEDURE — 93005 ELECTROCARDIOGRAM TRACING: CPT

## 2020-07-19 PROCEDURE — 74011250637 HC RX REV CODE- 250/637: Performed by: INTERNAL MEDICINE

## 2020-07-19 PROCEDURE — 84443 ASSAY THYROID STIM HORMONE: CPT

## 2020-07-19 PROCEDURE — 74011000250 HC RX REV CODE- 250: Performed by: EMERGENCY MEDICINE

## 2020-07-19 PROCEDURE — 71045 X-RAY EXAM CHEST 1 VIEW: CPT

## 2020-07-19 PROCEDURE — 81001 URINALYSIS AUTO W/SCOPE: CPT

## 2020-07-19 PROCEDURE — 99285 EMERGENCY DEPT VISIT HI MDM: CPT

## 2020-07-19 PROCEDURE — 65270000029 HC RM PRIVATE

## 2020-07-19 PROCEDURE — 96360 HYDRATION IV INFUSION INIT: CPT

## 2020-07-19 PROCEDURE — 74011250636 HC RX REV CODE- 250/636: Performed by: EMERGENCY MEDICINE

## 2020-07-19 PROCEDURE — 83880 ASSAY OF NATRIURETIC PEPTIDE: CPT

## 2020-07-19 PROCEDURE — 36415 COLL VENOUS BLD VENIPUNCTURE: CPT

## 2020-07-19 PROCEDURE — 84484 ASSAY OF TROPONIN QUANT: CPT

## 2020-07-19 PROCEDURE — 83735 ASSAY OF MAGNESIUM: CPT

## 2020-07-19 PROCEDURE — 74011250636 HC RX REV CODE- 250/636: Performed by: INTERNAL MEDICINE

## 2020-07-19 PROCEDURE — 85379 FIBRIN DEGRADATION QUANT: CPT

## 2020-07-19 PROCEDURE — 85025 COMPLETE CBC W/AUTO DIFF WBC: CPT

## 2020-07-19 PROCEDURE — 65660000000 HC RM CCU STEPDOWN

## 2020-07-19 PROCEDURE — 85610 PROTHROMBIN TIME: CPT

## 2020-07-19 PROCEDURE — 80053 COMPREHEN METABOLIC PANEL: CPT

## 2020-07-19 RX ORDER — SODIUM CHLORIDE 0.9 % (FLUSH) 0.9 %
5-40 SYRINGE (ML) INJECTION AS NEEDED
Status: DISCONTINUED | OUTPATIENT
Start: 2020-07-19 | End: 2020-07-20 | Stop reason: SDUPTHER

## 2020-07-19 RX ORDER — DILTIAZEM HYDROCHLORIDE 5 MG/ML
20 INJECTION INTRAVENOUS
Status: COMPLETED | OUTPATIENT
Start: 2020-07-19 | End: 2020-07-19

## 2020-07-19 RX ORDER — WARFARIN 2.5 MG/1
2.5 TABLET ORAL
COMMUNITY

## 2020-07-19 RX ORDER — SODIUM CHLORIDE 0.9 % (FLUSH) 0.9 %
5-40 SYRINGE (ML) INJECTION EVERY 8 HOURS
Status: DISCONTINUED | OUTPATIENT
Start: 2020-07-19 | End: 2020-07-24 | Stop reason: HOSPADM

## 2020-07-19 RX ORDER — ACETAMINOPHEN 325 MG/1
650 TABLET ORAL
Status: DISCONTINUED | OUTPATIENT
Start: 2020-07-19 | End: 2020-07-24 | Stop reason: HOSPADM

## 2020-07-19 RX ORDER — ONDANSETRON 2 MG/ML
4 INJECTION INTRAMUSCULAR; INTRAVENOUS
Status: DISCONTINUED | OUTPATIENT
Start: 2020-07-19 | End: 2020-07-24 | Stop reason: HOSPADM

## 2020-07-19 RX ORDER — NALOXONE HYDROCHLORIDE 0.4 MG/ML
0.4 INJECTION, SOLUTION INTRAMUSCULAR; INTRAVENOUS; SUBCUTANEOUS AS NEEDED
Status: DISCONTINUED | OUTPATIENT
Start: 2020-07-19 | End: 2020-07-24 | Stop reason: HOSPADM

## 2020-07-19 RX ORDER — WARFARIN 2.5 MG/1
3.75 TABLET ORAL
COMMUNITY

## 2020-07-19 RX ORDER — WARFARIN 2 MG/1
2 TABLET ORAL ONCE
Status: COMPLETED | OUTPATIENT
Start: 2020-07-19 | End: 2020-07-19

## 2020-07-19 RX ORDER — SODIUM CHLORIDE 9 MG/ML
125 INJECTION, SOLUTION INTRAVENOUS CONTINUOUS
Status: DISPENSED | OUTPATIENT
Start: 2020-07-19 | End: 2020-07-20

## 2020-07-19 RX ADMIN — SODIUM CHLORIDE 1000 ML: 900 INJECTION, SOLUTION INTRAVENOUS at 21:40

## 2020-07-19 RX ADMIN — DILTIAZEM HYDROCHLORIDE 20 MG: 5 INJECTION INTRAVENOUS at 20:32

## 2020-07-19 RX ADMIN — SODIUM CHLORIDE 125 ML/HR: 900 INJECTION, SOLUTION INTRAVENOUS at 19:12

## 2020-07-19 RX ADMIN — SODIUM CHLORIDE 500 ML: 900 INJECTION, SOLUTION INTRAVENOUS at 18:38

## 2020-07-19 RX ADMIN — WARFARIN SODIUM 2 MG: 2 TABLET ORAL at 20:39

## 2020-07-19 RX ADMIN — SODIUM CHLORIDE 1000 ML: 900 INJECTION, SOLUTION INTRAVENOUS at 17:31

## 2020-07-19 NOTE — Clinical Note
TRANSFER - IN REPORT:     Verbal report received from: WENDIE Forrester. Report consisted of patient's Situation, Background, Assessment and   Recommendations(SBAR). Opportunity for questions and clarification was provided. Assessment completed upon patient's arrival to unit and care assumed. Patient transported with a Registered Nurse.

## 2020-07-19 NOTE — Clinical Note
TRANSFER - OUT REPORT:     Verbal report given to: Rogelio. Report consisted of patient's Situation, Background, Assessment and   Recommendations(SBAR). Opportunity for questions and clarification was provided. Patient transported with a Registered Nurse. Patient transported to: ICU 7.

## 2020-07-19 NOTE — ED PROVIDER NOTES
30-year-old male [facility]. EMS reports a wide complex tachycardia for which they started amiodarone, giving him 150 mg or 10 minutes. He has extensive cardiac history with valvular disease and depressed EF but does not have any history of stents. He has a pacemaker/defibrillator implanted. [facility] for approximately 2 days of palpitations with weakness. He denies any shortness of breath, chest pain, or fevers. The history is provided by the patient and the EMS personnel. Palpitations    This is a new problem. The current episode started 2 days ago. The problem has not changed since onset. The problem occurs constantly. The problem is associated with nothing. Pertinent negatives include no fever, no chest pain, no chest pressure, no abdominal pain, no nausea, no vomiting, no headaches, no weakness, no cough and no shortness of breath.         Past Medical History:   Diagnosis Date    Anticoagulated on Coumadin 11/27/2017    Arthritis     Atrial fibrillation (HCC)     PAF    Cardiomyopathy ([facility] [address]     GI bleed 02/2017    HTN (hypertension)     Hypothyroidism     Melanoma in situ ([facility] [address]     Orthostatic hypotension 11/29/2017    Pacemaker 11/28/2017    Medtronic BiV ICD    S/P AVR (aortic valve replacement)     mechanical AVR    Syncope 4632    Systolic heart failure (HCC)     TIA (transient ischemic attack) 2017       Past Surgical History:   Procedure Laterality Date    HX AORTIC VALVE REPLACEMENT  2003    mechanical aortic valve     HX KNEE ARTHROSCOPY Right 1980's    HX MOHS PROCEDURES Left 2005    [place] and [place]    HX PACEMAKER  2011    with defibulator    HX TONSILLECTOMY  1952         Family History:   Problem Relation Age of Onset    Cancer Father        Social History     Socioeconomic History    Marital status:      Spouse name: Not on file    Number of children: Not on file    Years of education: Not on file    Highest education level: Not on file   Occupational History    Not on file   Social Needs    Financial resource strain: Not on file    Food insecurity     Worry: Not on file     Inability: Not on file    Transportation needs     Medical: Not on file     Non-medical: Not on file   Tobacco Use    Smoking status: Former Smoker    Smokeless tobacco: Never Used   Substance and Sexual Activity    Alcohol use: Not Currently    Drug use: No    Sexual activity: Yes   Lifestyle    Physical activity     Days per week: Not on file     Minutes per session: Not on file    Stress: Not on file   Relationships    Social connections     Talks on phone: Not on file     Gets together: Not on file     Attends Yazdanism service: Not on file     Active member of club or organization: Not on file     Attends meetings of clubs or organizations: Not on file     Relationship status: Not on file    Intimate partner violence     Fear of current or ex partner: Not on file     Emotionally abused: Not on file     Physically abused: Not on file     Forced sexual activity: Not on file   Other Topics Concern    Not on file   Social History Narrative    Not on file         ALLERGIES: Patient has no known allergies. Review of Systems   Constitutional: Negative for fatigue and fever. HENT: Negative for sneezing and sore throat. Respiratory: Negative for cough and shortness of breath. Cardiovascular: Positive for palpitations. Negative for chest pain and leg swelling. Gastrointestinal: Negative for abdominal pain, diarrhea, nausea and vomiting. Genitourinary: Negative for difficulty urinating and dysuria. Musculoskeletal: Negative for arthralgias and myalgias. Skin: Negative for color change and rash. Neurological: Negative for weakness and headaches. Psychiatric/Behavioral: Negative for agitation and behavioral problems. There were no vitals filed for this visit.          Physical Exam  Vitals signs and nursing note reviewed. Constitutional:       General: He is not in acute distress. Appearance: He is well-developed. HENT:      Head: Normocephalic and atraumatic. Mouth/Throat:      Mouth: Mucous membranes are moist.      Pharynx: Oropharynx is clear. Eyes:      Extraocular Movements: Extraocular movements intact. Pupils: Pupils are equal, round, and reactive to light. Neck:      Musculoskeletal: Normal range of motion and neck supple. Cardiovascular:      Rate and Rhythm: Regular rhythm. Tachycardia present. Pulses: Normal pulses. Heart sounds: No murmur. Pulmonary:      Effort: Pulmonary effort is normal.      Breath sounds: Normal breath sounds. Chest:      Chest wall: No mass or tenderness. Abdominal:      Palpations: Abdomen is soft. Tenderness: There is no abdominal tenderness. There is no guarding or rebound. Musculoskeletal:      Right lower leg: He exhibits no tenderness. No edema. Left lower leg: He exhibits no tenderness. No edema. Lymphadenopathy:      Cervical: No cervical adenopathy. Skin:     General: Skin is warm and dry. Neurological:      General: No focal deficit present. Mental Status: He is alert and oriented to person, place, and time. Psychiatric:         Mood and Affect: Mood normal.         Behavior: Behavior normal.          MDM  Number of Diagnoses or Management Options  Acute kidney injury [facility]): Other specified hypotension:   Palpitations:   Tachycardia:   Diagnosis management comments: 72-year-old gentleman presents by EMS as above with palpitations and tachycardia. He is EKG is more consistent with sinus tachycardia vs supraventricular tachycardia or A fib/flutter with indications and his blood work of hypovolemia. I suspect that his symptomatology is likely due to volume depletion with episodes of hypotension in the emergency department.   He was started on IV fluids and I plan to admit him for further work-up and management. Amount and/or Complexity of Data Reviewed  Clinical lab tests: reviewed  Tests in the radiology section of CPT®: reviewed  Decide to obtain previous medical records or to obtain history from someone other than the patient: yes    Risk of Complications, Morbidity, and/or Mortality  Presenting problems: moderate  Management options: moderate    Patient Progress  Patient progress: improved       1900: Discussed with Dr. Keerthi Epstein (cardiology) will review ekg and request device interrogation as well. 1950: repeat ekg and pacer interrogation sent to Dr. Keerthi Epstein. May be pacer failure vs other cardiogenic etiology for his symptoms. 2100: After discussion with cardiology gave diltiazem for rate control. This resulted in a drop of his blood pressure from 071 systolic to 80 systolic. He does not complain of any presyncope, chest pain, or other symptoms with this. We will rebolus fluids and potentially attempt DC cardioversion pending discussion with the patient. He is hesitant to try DC cardioversion at this time. 2300: The patient is pending admission at this time. He would not like to try DC cardioversion but would rather wait for cardiology. Procedures      Rhythm: Sinus tachycardia. Rate (approx.): 135. Axis: normal.  ST segments: Right bundle branch block with left axis deviation with nonspecific ST segment changes. This EKG was interpreted by Pk Pike MD,ED Provider. Hospitalist Perfect Serve for Admission  6:07 PM    ED Room Number: ER10/10  Patient Name and age:  Rosa Reyes 69 y.o.  male  Working Diagnosis: No diagnosis found.     COVID-19 Suspicion:  no    Code Status:  Full Code  Readmission: no  Isolation Requirements:  no  Recommended Level of Care:  telemetry  Department:[facility] ED - [phone]  Other: Tachycardia, hypotension, likely volume depletion as primary problem

## 2020-07-19 NOTE — H&P
[address]  [phone]    Admission History and Physical      NAME:  Francisco العراقي   :   1945   MRN:  [id number]     PCP:  Froy Quiroz MD     Date/Time:  2020         Subjective:     CHIEF COMPLAINT: \"I felt my heart racing\"     HISTORY OF PRESENT ILLNESS:     Mr. Negar Perez is a 69 y.o.  male with PMH of sCHF, s/p AVR with mechanical heart valve on coumadin, hypothyroidism, PAF with pacer/biV ICD admitted for tachycardia. Per pt, states he noted that his heart was racing prompting [facility] to [facility] to [facility]. While there he was noted to have a \"wide complex tachycardia\". EMS was called and pt was transported to the ED. En route was given amiodarone. He denies CP but does note GONZALES. No N/V, diaphoresis. In the ED pt was noted to be hypotensive.  With IVF's BP slightly improved     Past Medical History:   Diagnosis Date    Anticoagulated on Coumadin 2017    Arthritis     Atrial fibrillation (HCC)     PAF    Cardiomyopathy ([address]     GI bleed 2017    HTN (hypertension)     Hypothyroidism     Melanoma in situ ([address]     Orthostatic hypotension 2017    Pacemaker 2017    Medtronic BiV ICD    S/P AVR (aortic valve replacement)     mechanical AVR    Syncope 7635    Systolic heart failure ([address]     TIA (transient ischemic attack)         Past Surgical History:   Procedure Laterality Date    HX AORTIC VALVE REPLACEMENT      mechanical aortic valve     HX KNEE ARTHROSCOPY Right     HX MOHS PROCEDURES Left     Cheek and Scientologist    HX PACEMAKER      with defibulator    HX TONSILLECTOMY         Social History     Tobacco Use    Smoking status: Former Smoker    Smokeless tobacco: Never Used   Substance Use Topics    Alcohol use: Not Currently        Family History   Problem Relation Age of Onset    Cancer Father         No Known Allergies     Prior to Admission medications    Medication Sig Start Date End Date Taking? Authorizing Provider   sacubitril-valsartan (ENTRESTO) 49 mg/51 mg tablet Take 1 Tab by mouth two (2) times a day. Esau Ramsay MD   spironolactone (ALDACTONE) 25 mg tablet Take 25 mg by mouth two (2) times a day. Take 1/2 tablet two times daily    Provider, Historical   sacubitril-valsartan (ENTRESTO) 24 mg/26 mg tablet Take 1 Tab by mouth every twelve (12) hours. Patient not taking: Reported on 2/21/2020 2/10/20   Ede PETERS NP   warfarin (COUMADIN) 2.5 mg tablet Take 2 Tabs by mouth nightly. 2/10/20   Teresa Bahena NP   polyethylene glycol (MIRALAX) 17 gram packet Take 1 Packet by mouth daily. 17 g = 1 packet  Indications: constipation, If constipation last more than 24-48 hours, despite colace use. 1/30/20   Royal Leo MD   acetaminophen (TYLENOL) 325 mg tablet Take 3 Tabs by mouth every eight (8) hours as needed for Pain. 1/30/20   Royal Leo MD   enoxaparin (LOVENOX) 100 mg/mL by SubCUTAneous route every twelve (12) hours. Provider, Historical   amoxicillin-clavulanate (AUGMENTIN) 875-125 mg per tablet Take 1 Tab by mouth two (2) times a day. Provider, Historical   levothyroxine (SYNTHROID) 25 mcg tablet Take 25 mcg by mouth Daily (before breakfast). Provider, Historical   aspirin delayed-release 81 mg tablet Take 81 mg by mouth daily. Provider, Historical   pravastatin (PRAVACHOL) 40 mg tablet Take 1 Tab by mouth nightly. 12/13/17   Annia Frazier NP   Cholecalciferol, Vitamin D3, (VITAMIN D3) 1,000 unit chew Take 2,000 Units by mouth daily.     Provider, Historical         Review of Systems:  (bold if positive, if negative)    Gen:  Eyes:  ENT:  CVS:  Pulm:  GI:    :    MS:  Skin:  Psych:  Endo:    Hem:  Renal:    Neuro:     Dyspnea        Objective:      VITALS:    Vital signs reviewed; most recent are:    Visit Vitals  /68   Pulse (!) 136   Temp 97.6 °F (36.4 °C)   Resp 17   Ht 6' (1.829 m)   Wt 83.9 kg (185 lb)   SpO2 97%   BMI 25.09 kg/m²     SpO2 Readings from Last 6 Encounters:   07/19/20 97%   02/26/20 98%   02/21/20 99%   02/11/20 96%   02/10/20 96%   01/30/20 97%        No intake or output data in the 24 hours ending 07/19/20 1918         Exam:     Physical Exam:    Gen:  Well-developed, well-nourished, in no acute distress  HEENT:  Pink conjunctivae, PERRL, hearing intact to voice, moist mucous membranes  Neck:  Supple, without masses, thyroid non-tender  Resp:  No accessory muscle use, clear breath sounds without wheezes rales or rhonchi  Card: Tachycardic. Regular. Harsh murmur   Abd:  Soft, non-tender, non-distended, normoactive bowel sounds are present, no palpable organomegaly  Lymph:  No cervical adenopathy  Musc:  No cyanosis or clubbing  Skin:  No rashes or ulcers, skin turgor is good  Neuro:  Cranial nerves 3-12 are grossly intact,  strength is 5/5 bilaterally, dorsi / plantarflexion strength is 5/5 bilaterally, follows commands appropriately  Psych:  Alert with good insight. Oriented to person, place, and time       Labs:    Recent Labs     07/19/20  1705   WBC 5.4   HGB 13.4   HCT 41.2        Recent Labs     07/19/20  1705      K 4.2   *   CO2 24   *   BUN 19   CREA 1.34*   CA 8.8   MG 2.0   ALB 3.9   ALT 55     No components found for: GLPOC  No results for input(s): PH, PCO2, PO2, HCO3, FIO2 in the last 72 hours. Recent Labs     07/19/20  1705   INR 3.6*          Assessment/Plan:     Hypotension - ?2/2 IVVD + medications spironolactone, Entresto in addition to tachycardia which may be causing poor forward perfusion   -continue IVF's   -no e/o infectious process; check UA for completion   -hold spironolactone and Entresto for now   -serial CE's   -check TTE   -cardiology eval     Tachycardia - does appear sinus on the monitor. EKG difficult to interpret. ?sinus tach ? SVT ? a-flutter   -pacer interrogation   -cardiology consulted   -K, Mg, Ca WNL -check TSH   -as above     H/o AVR  -on coumadin; goal INR 2.5-3.5     H/o sCHF   -monitor volume status with IVF's   -?why not on beta blocker  -no ACE I as on Entresto; holding for now     Elevated Cr - ?mild IVVD   -hold spironolactone, Entresto   -IVF's    PAF - pacer in place   -may need rate controlling agent pending pacer interrogation     Surrogate decision maker: Wife     Total time spent with patient: [address] discussed with: Patient and Family    Discussed:  Code Status, Care Plan and D/C Planning    Prophylaxis:  Coumadin    Probable Disposition:  Home w/Family           ___________________________________________________    Attending Physician: Dasha Chow MD

## 2020-07-19 NOTE — ED NOTES
Spoke with  the tech from [facility]. Stated that pacemaker works well. Will fax the report.

## 2020-07-19 NOTE — Clinical Note
TRANSFER - OUT REPORT:     Verbal report given to: BEDSIDE. Report consisted of patient's Situation, Background, Assessment and   Recommendations(SBAR). Opportunity for questions and clarification was provided. Patient transported to: [facility].

## 2020-07-19 NOTE — ED TRIAGE NOTES
Pt arrives via EMS with complaints of rapid heart rate/palpitations since last night. Pt denies CP and SOB. Pt has significant cardiac hx with pace maker defib in place. Pt was given amiodarone 150mg.

## 2020-07-20 ENCOUNTER — APPOINTMENT (OUTPATIENT)
Dept: NON INVASIVE DIAGNOSTICS | Age: 75
DRG: 274 | End: 2020-07-20
Attending: INTERNAL MEDICINE
Payer: MEDICARE

## 2020-07-20 PROBLEM — I49.5 TACHY-BRADY SYNDROME (HCC): Status: ACTIVE | Noted: 2020-07-20

## 2020-07-20 PROBLEM — R00.0 TACHYCARDIA: Status: ACTIVE | Noted: 2020-07-20

## 2020-07-20 LAB
ANION GAP SERPL CALC-SCNC: 5 MMOL/L (ref 5–15)
ATRIAL RATE: 105 BPM
ATRIAL RATE: 135 BPM
BASOPHILS # BLD: 0 K/UL (ref 0–0.1)
BASOPHILS NFR BLD: 1 % (ref 0–1)
BUN SERPL-MCNC: 14 MG/DL (ref 6–20)
BUN/CREAT SERPL: 14 (ref 12–20)
CALCIUM SERPL-MCNC: 8 MG/DL (ref 8.5–10.1)
CALCULATED P AXIS, ECG09: -24 DEGREES
CALCULATED R AXIS, ECG10: -60 DEGREES
CALCULATED R AXIS, ECG10: -66 DEGREES
CALCULATED T AXIS, ECG11: 123 DEGREES
CALCULATED T AXIS, ECG11: 133 DEGREES
CHLORIDE SERPL-SCNC: 116 MMOL/L (ref 97–108)
CO2 SERPL-SCNC: 21 MMOL/L (ref 21–32)
CREAT SERPL-MCNC: 0.97 MG/DL (ref 0.7–1.3)
DIAGNOSIS, 93000: NORMAL
DIAGNOSIS, 93000: NORMAL
DIFFERENTIAL METHOD BLD: ABNORMAL
EOSINOPHIL # BLD: 0.1 K/UL (ref 0–0.4)
EOSINOPHIL NFR BLD: 3 % (ref 0–7)
ERYTHROCYTE [DISTWIDTH] IN BLOOD BY AUTOMATED COUNT: 15 % (ref 11.5–14.5)
GLUCOSE SERPL-MCNC: 101 MG/DL (ref 65–100)
HCT VFR BLD AUTO: 40.1 % (ref 36.6–50.3)
HGB BLD-MCNC: 12.4 G/DL (ref 12.1–17)
IMM GRANULOCYTES # BLD AUTO: 0 K/UL (ref 0–0.04)
IMM GRANULOCYTES NFR BLD AUTO: 0 % (ref 0–0.5)
INR PPP: 3.7 (ref 0.9–1.1)
LYMPHOCYTES # BLD: 1.4 K/UL (ref 0.8–3.5)
LYMPHOCYTES NFR BLD: 31 % (ref 12–49)
MAGNESIUM SERPL-MCNC: 2.1 MG/DL (ref 1.6–2.4)
MCH RBC QN AUTO: 29.7 PG (ref 26–34)
MCHC RBC AUTO-ENTMCNC: 30.9 G/DL (ref 30–36.5)
MCV RBC AUTO: 95.9 FL (ref 80–99)
MONOCYTES # BLD: 0.4 K/UL (ref 0–1)
MONOCYTES NFR BLD: 8 % (ref 5–13)
NEUTS SEG # BLD: 2.5 K/UL (ref 1.8–8)
NEUTS SEG NFR BLD: 57 % (ref 32–75)
NRBC # BLD: 0 K/UL (ref 0–0.01)
NRBC BLD-RTO: 0 PER 100 WBC
P-R INTERVAL, ECG05: 126 MS
PLATELET # BLD AUTO: 154 K/UL (ref 150–400)
PMV BLD AUTO: 10.4 FL (ref 8.9–12.9)
POTASSIUM SERPL-SCNC: 4 MMOL/L (ref 3.5–5.1)
PROTHROMBIN TIME: 34.4 SEC (ref 9–11.1)
Q-T INTERVAL, ECG07: 340 MS
Q-T INTERVAL, ECG07: 398 MS
QRS DURATION, ECG06: 152 MS
QRS DURATION, ECG06: 190 MS
QTC CALCULATION (BEZET), ECG08: 510 MS
QTC CALCULATION (BEZET), ECG08: 581 MS
RBC # BLD AUTO: 4.18 M/UL (ref 4.1–5.7)
SODIUM SERPL-SCNC: 142 MMOL/L (ref 136–145)
VENTRICULAR RATE, ECG03: 128 BPM
VENTRICULAR RATE, ECG03: 135 BPM
WBC # BLD AUTO: 4.5 K/UL (ref 4.1–11.1)

## 2020-07-20 PROCEDURE — 74011250636 HC RX REV CODE- 250/636: Performed by: INTERNAL MEDICINE

## 2020-07-20 PROCEDURE — 83735 ASSAY OF MAGNESIUM: CPT

## 2020-07-20 PROCEDURE — 36415 COLL VENOUS BLD VENIPUNCTURE: CPT

## 2020-07-20 PROCEDURE — 65610000006 HC RM INTENSIVE CARE

## 2020-07-20 PROCEDURE — 74011000258 HC RX REV CODE- 258: Performed by: INTERNAL MEDICINE

## 2020-07-20 PROCEDURE — 77030018729 HC ELECTRD DEFIB PAD CARD -B: Performed by: INTERNAL MEDICINE

## 2020-07-20 PROCEDURE — 99153 MOD SED SAME PHYS/QHP EA: CPT | Performed by: INTERNAL MEDICINE

## 2020-07-20 PROCEDURE — 85610 PROTHROMBIN TIME: CPT

## 2020-07-20 PROCEDURE — 99218 HC RM OBSERVATION: CPT

## 2020-07-20 PROCEDURE — 74011250637 HC RX REV CODE- 250/637: Performed by: INTERNAL MEDICINE

## 2020-07-20 PROCEDURE — 99152 MOD SED SAME PHYS/QHP 5/>YRS: CPT | Performed by: INTERNAL MEDICINE

## 2020-07-20 PROCEDURE — 74011000250 HC RX REV CODE- 250: Performed by: INTERNAL MEDICINE

## 2020-07-20 PROCEDURE — C1732 CATH, EP, DIAG/ABL, 3D/VECT: HCPCS | Performed by: INTERNAL MEDICINE

## 2020-07-20 PROCEDURE — 77030027107 HC PTCH EXT REF CARTO3 J&J -F: Performed by: INTERNAL MEDICINE

## 2020-07-20 PROCEDURE — 80048 BASIC METABOLIC PNL TOTAL CA: CPT

## 2020-07-20 PROCEDURE — 93650 ICAR CATH ABLTJ AV NODE FUNC: CPT | Performed by: INTERNAL MEDICINE

## 2020-07-20 PROCEDURE — 02583ZZ DESTRUCTION OF CONDUCTION MECHANISM, PERCUTANEOUS APPROACH: ICD-10-PCS | Performed by: INTERNAL MEDICINE

## 2020-07-20 PROCEDURE — C1893 INTRO/SHEATH, FIXED,NON-PEEL: HCPCS | Performed by: INTERNAL MEDICINE

## 2020-07-20 PROCEDURE — C1760 CLOSURE DEV, VASC: HCPCS | Performed by: INTERNAL MEDICINE

## 2020-07-20 PROCEDURE — 85025 COMPLETE CBC W/AUTO DIFF WBC: CPT

## 2020-07-20 PROCEDURE — 93613 INTRACARDIAC EPHYS 3D MAPG: CPT | Performed by: INTERNAL MEDICINE

## 2020-07-20 RX ORDER — FENTANYL CITRATE 50 UG/ML
INJECTION, SOLUTION INTRAMUSCULAR; INTRAVENOUS AS NEEDED
Status: DISCONTINUED | OUTPATIENT
Start: 2020-07-20 | End: 2020-07-20 | Stop reason: HOSPADM

## 2020-07-20 RX ORDER — WARFARIN 2 MG/1
2 TABLET ORAL ONCE
Status: COMPLETED | OUTPATIENT
Start: 2020-07-20 | End: 2020-07-20

## 2020-07-20 RX ORDER — SODIUM CHLORIDE 0.9 % (FLUSH) 0.9 %
5-40 SYRINGE (ML) INJECTION EVERY 8 HOURS
Status: DISCONTINUED | OUTPATIENT
Start: 2020-07-20 | End: 2020-07-20 | Stop reason: SDUPTHER

## 2020-07-20 RX ORDER — ESMOLOL HYDROCHLORIDE 10 MG/ML
0-200 INJECTION, SOLUTION INTRAVENOUS
Status: DISCONTINUED | OUTPATIENT
Start: 2020-07-20 | End: 2020-07-23

## 2020-07-20 RX ORDER — ESMOLOL HYDROCHLORIDE 10 MG/ML
50 INJECTION, SOLUTION INTRAVENOUS
Status: DISCONTINUED | OUTPATIENT
Start: 2020-07-20 | End: 2020-07-20

## 2020-07-20 RX ORDER — CEFAZOLIN SODIUM 1 G/3ML
2 INJECTION, POWDER, FOR SOLUTION INTRAMUSCULAR; INTRAVENOUS ONCE
Status: DISCONTINUED | OUTPATIENT
Start: 2020-07-20 | End: 2020-07-20

## 2020-07-20 RX ORDER — MIDAZOLAM HYDROCHLORIDE 1 MG/ML
INJECTION, SOLUTION INTRAMUSCULAR; INTRAVENOUS AS NEEDED
Status: DISCONTINUED | OUTPATIENT
Start: 2020-07-20 | End: 2020-07-20 | Stop reason: HOSPADM

## 2020-07-20 RX ORDER — HEPARIN SODIUM 200 [USP'U]/100ML
INJECTION, SOLUTION INTRAVENOUS
Status: COMPLETED | OUTPATIENT
Start: 2020-07-20 | End: 2020-07-20

## 2020-07-20 RX ORDER — ESMOLOL HYDROCHLORIDE 10 MG/ML
250 INJECTION INTRAVENOUS ONCE
Status: COMPLETED | OUTPATIENT
Start: 2020-07-20 | End: 2020-07-20

## 2020-07-20 RX ORDER — DIPHENHYDRAMINE HYDROCHLORIDE 50 MG/ML
INJECTION, SOLUTION INTRAMUSCULAR; INTRAVENOUS AS NEEDED
Status: DISCONTINUED | OUTPATIENT
Start: 2020-07-20 | End: 2020-07-20 | Stop reason: HOSPADM

## 2020-07-20 RX ORDER — CEFAZOLIN SODIUM 1 G/3ML
INJECTION, POWDER, FOR SOLUTION INTRAMUSCULAR; INTRAVENOUS AS NEEDED
Status: DISCONTINUED | OUTPATIENT
Start: 2020-07-20 | End: 2020-07-20 | Stop reason: HOSPADM

## 2020-07-20 RX ORDER — SODIUM CHLORIDE 9 MG/ML
250 INJECTION, SOLUTION INTRAVENOUS ONCE
Status: COMPLETED | OUTPATIENT
Start: 2020-07-20 | End: 2020-07-20

## 2020-07-20 RX ORDER — SODIUM CHLORIDE 0.9 % (FLUSH) 0.9 %
5-40 SYRINGE (ML) INJECTION AS NEEDED
Status: DISCONTINUED | OUTPATIENT
Start: 2020-07-20 | End: 2020-07-24 | Stop reason: HOSPADM

## 2020-07-20 RX ORDER — LIDOCAINE HYDROCHLORIDE 10 MG/ML
INJECTION INFILTRATION; PERINEURAL AS NEEDED
Status: DISCONTINUED | OUTPATIENT
Start: 2020-07-20 | End: 2020-07-20 | Stop reason: HOSPADM

## 2020-07-20 RX ORDER — ACETAMINOPHEN 325 MG/1
650 TABLET ORAL
Status: DISCONTINUED | OUTPATIENT
Start: 2020-07-20 | End: 2020-07-20 | Stop reason: SDUPTHER

## 2020-07-20 RX ADMIN — SODIUM CHLORIDE 125 ML/HR: 900 INJECTION, SOLUTION INTRAVENOUS at 17:14

## 2020-07-20 RX ADMIN — SODIUM CHLORIDE 500 ML: 900 INJECTION, SOLUTION INTRAVENOUS at 08:11

## 2020-07-20 RX ADMIN — SODIUM CHLORIDE 500 ML: 900 INJECTION, SOLUTION INTRAVENOUS at 00:47

## 2020-07-20 RX ADMIN — SODIUM CHLORIDE 2.5 MG/HR: 900 INJECTION, SOLUTION INTRAVENOUS at 00:47

## 2020-07-20 RX ADMIN — ESMOLOL HYDROCHLORIDE 21 MG: 100 INJECTION, SOLUTION INTRAVENOUS at 06:24

## 2020-07-20 RX ADMIN — Medication 10 ML: at 00:47

## 2020-07-20 RX ADMIN — ESMOLOL HYDROCHLORIDE 50 MCG/KG/MIN: 10 INJECTION INTRAVENOUS at 06:27

## 2020-07-20 RX ADMIN — ESMOLOL HYDROCHLORIDE 50 MCG/KG/MIN: 10 INJECTION INTRAVENOUS at 21:26

## 2020-07-20 RX ADMIN — SODIUM CHLORIDE 125 ML/HR: 900 INJECTION, SOLUTION INTRAVENOUS at 03:21

## 2020-07-20 RX ADMIN — Medication 20 ML: at 14:00

## 2020-07-20 RX ADMIN — WARFARIN SODIUM 2 MG: 2 TABLET ORAL at 17:10

## 2020-07-20 RX ADMIN — Medication 10 ML: at 06:25

## 2020-07-20 NOTE — CONSULTS
HISTORY OF PRESENTING ILLNESS      Ayan Tillman is a 69 y.o. male with cardiomyopathy (nonischemic?), CRTD, mechanical AVR, TIA presenting with palpitations over the past few days. He was found to be in atrial fibrillation with RVR. ICD interrogation demonstrated AF dating back to 2019. Recent atrial arrhythmia events were not available for review via ICD interrogation due to current atrial lead sensitivity settings. Ventricular rates have been in the 130-150 range; cardizem drip was poorly tolerated hemodynamically; esmolol drip was initiated however also failed to improve rate control and was associated with hypotension as well (SBP 67 mm Hg). IV fluids were given and improved SBP to the 80-90 mm Hg range.         ACTIVE PROBLEM LIST     Patient Active Problem List    Diagnosis Date Noted    Tachycardia 07/20/2020     Priority: 1 - One    Tachy-donal syndrome ([facility] 07/20/2020     Priority: 1 - One    Hypotension 07/19/2020    H/O aortic valve replacement 12/04/2017    CVA (cerebral vascular accident) ([facility] 12/01/2017    Orthostatic hypotension 11/29/2017    Pacemaker 11/28/2017    TIA (transient ischemic attack) 11/27/2017    CHF (congestive heart failure) ([facility] 11/27/2017    HTN (hypertension) 11/27/2017    Elevated serum creatinine 11/27/2017    Chronic anticoagulation 11/27/2017           MEDICATIONS     Current Facility-Administered Medications   Medication Dose Route Frequency    esmolol 10mg/mL (BREVIBLOC) infusion  50 mcg/kg/min IntraVENous TITRATE    warfarin (COUMADIN) tablet 2 mg  2 mg Oral ONCE    sodium chloride (NS) flush 5-40 mL  5-40 mL IntraVENous PRN    0.9% sodium chloride infusion  125 mL/hr IntraVENous CONTINUOUS    sodium chloride (NS) flush 5-40 mL  5-40 mL IntraVENous Q8H    acetaminophen (TYLENOL) tablet 650 mg  650 mg Oral Q4H PRN    naloxone (NARCAN) injection 0.4 mg  0.4 mg IntraVENous PRN    ondansetron (ZOFRAN) injection 4 mg  4 mg IntraVENous Q4H PRN    Warfarin pharmacy to dose   Other Rx Dosing/Monitoring           PAST MEDICAL HISTORY     Past Medical History:   Diagnosis Date    Anticoagulated on Coumadin 11/27/2017    Arthritis     Atrial fibrillation (HCC)     PAF    Cardiomyopathy ([facility]     GI bleed 02/2017    HTN (hypertension)     Hypothyroidism     Melanoma in situ ([facility]     Orthostatic hypotension 11/29/2017    Pacemaker 11/28/2017    Medtronic BiV ICD    S/P AVR (aortic valve replacement)     mechanical AVR    Syncope 2438    Systolic heart failure (HCC)     TIA (transient ischemic attack) 2017           PAST SURGICAL HISTORY     Past Surgical History:   Procedure Laterality Date    HX AORTIC VALVE REPLACEMENT  2003    mechanical aortic valve     HX KNEE ARTHROSCOPY Right 1980's    HX MOHS PROCEDURES Left 2005    Cheek and Muslim    HX PACEMAKER  2011    with defibulator    HX TONSILLECTOMY  1952          ALLERGIES     No Known Allergies       FAMILY HISTORY     Family History   Problem Relation Age of Onset    Cancer Father     negative for cardiac disease       SOCIAL HISTORY     Social History     Socioeconomic History    Marital status:      Spouse name: Not on file    Number of children: Not on file    Years of education: Not on file    Highest education level: Not on file   Tobacco Use    Smoking status: Former Smoker    Smokeless tobacco: Never Used   Substance and Sexual Activity    Alcohol use: Not Currently    Drug use: No    Sexual activity: Yes         MEDICATIONS     Current Facility-Administered Medications   Medication Dose Route Frequency    esmolol 10mg/mL (BREVIBLOC) infusion  50 mcg/kg/min IntraVENous TITRATE    warfarin (COUMADIN) tablet 2 mg  2 mg Oral ONCE    sodium chloride (NS) flush 5-40 mL  5-40 mL IntraVENous PRN    0.9% sodium chloride infusion  125 mL/hr IntraVENous CONTINUOUS    sodium chloride (NS) flush 5-40 mL  5-40 mL IntraVENous Q8H    acetaminophen (TYLENOL) tablet 650 mg  650 mg Oral Q4H PRN    naloxone (NARCAN) injection 0.4 mg  0.4 mg IntraVENous PRN    ondansetron (ZOFRAN) injection 4 mg  4 mg IntraVENous Q4H PRN    Warfarin pharmacy to dose   Other Rx Dosing/Monitoring       I have reviewed the nurses notes, vitals, problem list, allergy list, medical history, family, social history and medications. REVIEW OF SYMPTOMS      General: Pt denies excessive weight gain or loss. Pt is able to conduct ADL's  HEENT: Denies blurred vision, headaches, hearing loss, epistaxis and difficulty swallowing. Respiratory: Denies cough, congestion, shortness of breath, GONZALES, wheezing or stridor. Cardiovascular: Denies precordial pain, palpitations, edema or PND  Gastrointestinal: Denies poor appetite, indigestion, abdominal pain or blood in stool  Genitourinary: Denies hematuria, dysuria, increased urinary frequency  Musculoskeletal: Denies joint pain or swelling from muscles or joints  Neurologic: Denies tremor, paresthesias, headache, or sensory motor disturbance  Psychiatric: Denies confusion, insomnia, depression  Integumentray: Denies rash, itching or ulcers. Hematologic: Denies easy bruising, bleeding       PHYSICAL EXAMINATION      Vitals:    07/20/20 1430 07/20/20 1445 07/20/20 1500 07/20/20 1515   BP: 117/77 120/87 126/77 118/81   Pulse: 90 90 88 92   Resp: 20 21 16 21   Temp:       SpO2: 99% 99% 99% 100%   Weight:       Height:         General: Well developed, in no acute distress. HEENT: No jaundice, oral mucosa moist, no oral ulcers  Neck: Supple, no stiffness, no lymphadenopathy, supple  Heart:  irreg irreg, no murmur, gallop or rub, no jugular venous distention  Respiratory: Clear bilaterally x 4, no wheezing or rales  Abdomen:   Soft, non-tender, bowel sounds are active.   Extremities:  No edema, normal cap refill, no cyanosis.   Musculoskeletal: No clubbing, no deformities  Neuro: A&Ox3, speech clear, gait stable, cooperative, no focal neurologic deficits  Skin: Skin color is normal. No rashes or lesions. Non diaphoretic, moist.  Vascular: 2+ pulses symmetric in all extremities       DIAGNOSTIC DATA      TELEMETRY: AF with RVR        LABORATORY DATA      Lab Results   Component Value Date/Time    WBC 4.5 07/20/2020 04:11 AM    HGB 12.4 07/20/2020 04:11 AM    HCT 40.1 07/20/2020 04:11 AM    PLATELET 549 66/56/2578 04:11 AM    MCV 95.9 07/20/2020 04:11 AM      Lab Results   Component Value Date/Time    Sodium 142 07/20/2020 04:11 AM    Potassium 4.0 07/20/2020 04:11 AM    Chloride 116 (H) 07/20/2020 04:11 AM    CO2 21 07/20/2020 04:11 AM    Anion gap 5 07/20/2020 04:11 AM    Glucose 101 (H) 07/20/2020 04:11 AM    BUN 14 07/20/2020 04:11 AM    Creatinine 0.97 07/20/2020 04:11 AM    BUN/Creatinine ratio 14 07/20/2020 04:11 AM    GFR est AA >60 07/20/2020 04:11 AM    GFR est non-AA >60 07/20/2020 04:11 AM    Calcium 8.0 (L) 07/20/2020 04:11 AM    Bilirubin, total 1.0 07/19/2020 05:05 PM    Alk. phosphatase 86 07/19/2020 05:05 PM    Protein, total 7.0 07/19/2020 05:05 PM    Albumin 3.9 07/19/2020 05:05 PM    Globulin 3.1 07/19/2020 05:05 PM    A-G Ratio 1.3 07/19/2020 05:05 PM    ALT (SGPT) 55 07/19/2020 05:05 PM           ASSESSMENT      1. Atrial fibrillation   A. Long-standing persistent   B. RVR  2. Aortic valve replacement   A. Mechanical   B. Coumadin  3. Hypotension  4. ICD   A. Medtronic   B. Left sided   C. CRTD  5. Hypertension  6. Cardiomyopathy   A. LVEF 40-45%  7. GI bleed history  8. Hypertension  9. Orthostatic hypotension   10. TIA         PLAN     Case discussed with Davie Barrow MD. Given hypotension and long-standing persistent AF, will plan for AV node ablation today. Keep NPO. Thank you, Katrinka Sever, MD and Dr. Antony Burnette for involving me in the care of this extraordinarily pleasant male. Please do not hesitate to contact me for further questions/concerns.          Rolando Zuluaga MD  Cardiac Electrophysiology / Cardiology    [facility] [address] [place] [address], [address] [place] [address]    Mehdi Lewis  [phone] / [phone] Fax   [phone] / [phone] Fax

## 2020-07-20 NOTE — PROGRESS NOTES
Admission Medication Reconciliation:     Information obtained from:    Patient via phone call to room ER 10  RxQuery data available¹:  YES    Comments/Recommendations:   Patient able to confirm name, , allergies, and preferred pharmacy  Updated PTA medication list  Warfarin - The patient reports an INR goal of 2.5 to 3.5. He reports he is stable on his current and his last dose was yesterday evening. ¹RxQuery pharmacy benefit data reflects medications filled and processed through the patient's insurance, however   this data does NOT capture whether the medication was picked up or is currently being taken by the patient. Prior to Admission Medications   Prescriptions Last Dose Informant Taking?   aspirin delayed-release 81 mg tablet 2020 at Unknown time Self Yes   Sig: Take 81 mg by mouth nightly. cholecalciferol, vitamin D3, (Vitamin D3) 50 mcg (2,000 unit) tab 2020 at Unknown time Self Yes   Sig: Take 2,000 Units by mouth daily. levothyroxine (SYNTHROID) 25 mcg tablet 2020 at Unknown time Self Yes   Sig: Take 25 mcg by mouth Daily (before breakfast). pravastatin (PRAVACHOL) 40 mg tablet 2020 at Unknown time Self Yes   Sig: Take 1 Tab by mouth nightly. sacubitril-valsartan (ENTRESTO) 49 mg/51 mg tablet 2020 at Unknown time Self Yes   Sig: Take 1 Tab by mouth two (2) times a day. spironolactone (ALDACTONE) 25 mg tablet 2020 at Unknown time Self Yes   Sig: Take 12.5 mg by mouth two (2) times a day. Take 1/2 tablet two times daily   warfarin (COUMADIN) 2.5 mg tablet  Self Yes   Sig: Take 2.5 mg by mouth six (6) days a week. The patient takes 2.5 mg in the evening on Monday, Wednesday, Thursday, Friday, Saturday,    warfarin (COUMADIN) 2.5 mg tablet 2020 Self Yes   Sig: Take 3.75 mg by mouth every Tuesday.       Facility-Administered Medications: None         Please contact the main inpatient pharmacy with any questions or concerns at [phone] and we will direct you to the clinical pharmacist covering this patient's care while in-house.    Zarina Whaley, KariD, BCPS

## 2020-07-20 NOTE — ED NOTES
Dr Aleah Nunn notified of patient blood pressure, Dr Aleha Nunn at bedside at this time. Informing patient on different plans of care.

## 2020-07-20 NOTE — ED NOTES
Visit Vitals  BP (!) 89/66 (BP 1 Location: Left arm, BP Patient Position: At rest)   Pulse (!) 141   Temp 97.8 °F (36.6 °C)   Resp 20   Ht 6' (1.829 m)   Wt 83.9 kg (185 lb)   SpO2 97%   BMI 25.09 kg/m²       0005 Dr Benjamín Preciado made aware advised to discuss with cardiology, cardiology paged    Belén Raza Cardiology repaged   Marcus Captain Dr. Daja Gilbert returned page, orders received to place patient on titratable Cardizem drip to start at 2.5 mg, give 250 mL normal saline bolus and if helpful give additional 250 mL normal saline bolus for a total of 500 mL.  Patient may likely need cardioversion

## 2020-07-20 NOTE — PROGRESS NOTES
Brief procedure note: AV carlos ablation performed by Dr. Lida Wood, full procedure note to follow up.      Debra Bagley NP

## 2020-07-20 NOTE — ED NOTES
Spoke with Dr. Omar Donovan. Patient is allowed to eat and drink at this time. Patient provided with food.

## 2020-07-20 NOTE — PROGRESS NOTES
[facility] Dosing Services: 7/19/20    Consult for Warfarin Dosing by Pharmacy by Dr. Luis Smith provided for this 69 y.o.  male , for indication of Mechanical AVR  Day of Therapy Continued from home. The patient takes 2.5 mg in the evening on Monday, Wednesday, Thursday, Friday, Saturday, Sunday. On Tuesday the patient takes 3.75 mg. Dose to achieve an INR goal of 2.5- 3.5    Order entered for  Warfarin  2 (mg) ordered to be given today at 18:00. Significant drug interactions:   Previous dose given 2.5 mg 7/18/20 per med rec   PT/INR Lab Results   Component Value Date/Time    INR 3.6 (H) 07/19/2020 05:05 PM      Platelets Lab Results   Component Value Date/Time    PLATELET 179 96/27/2079 05:05 PM      H/H Lab Results   Component Value Date/Time    HGB 13.4 07/19/2020 05:05 PM        Pharmacy to follow daily and will provide subsequent Warfarin dosing based on clinical status.   Vandana Velez Hilbert Isa)  Contact information [phone]

## 2020-07-20 NOTE — PROCEDURES
Cardiac Electrophysiology Report      PATIENT INFORMATION        Patient Name: Vernel Sever   MRN: [id number]                              Study Date: 2020    YOB: 1945    Age: 69 y.o. Gender: male      Procedure:  Atrioventricular Node AblationRa    Referring Physician:  Abby Monson MD and Dr. Daylin Mayo     Duty Name   Electrophysiologist Walter Hernandez MD   Monitor Leslie Baron RN   Circulator Felicia Kyle RN; Maximiliano Christianson RN       PATIENT HISTORY     Vernel Sever is a 69 y.o. male with cardiomyopathy (nonischemic?), CRTD, mechanical AVR, TIA presenting with palpitations/fatigue over the past few days. He was found to be in atrial fibrillation with RVR. ICD interrogation demonstrated AF dating back to 2019. Recent atrial arrhythmia events were not available for review via ICD interrogation due to current atrial lead sensitivity settings. Recent CRT percentage was < 90%. Ventricular rates have been in the 130-150 range; cardizem drip was poorly tolerated hemodynamically; esmolol drip was initiated however also failed to improve rate control and was associated with hypotension as well (SBP 67 mm Hg). IV fluids were given and improved SBP to the 80-90 mm Hg range. He now presents for AV node ablation.        PROCEDURE     The patient was brought to the Cardiac Electrophysiology laboratory in a post-absorptive, fasting state. Informed consent was obtained. A peripheral IV was in place. Continuous electrocardiographic, blood pressure, O2 saturation and  CO2 monitoring was initiated. Self-adhesive cardioversion patches were positioned on the chest. Conscious sedation was administered per protocol. The patient was then prepped and draped in the usual sterile fashion. Both groins were infiltrated with a 50/50 mixture of Lidocaine (1%) and bupivicaine (0.5%).  Vascular access was obtained in the right common femoral vein using an SR0 sheath. Through this sheath, an 8mm, non-irrigated tip ablation catheter was then advanced to the level of the His bundle. Three-dimensional mapping was utilized to minimize fluoroscopy. Several applications of RF energy were delivered at this site until complete heart block was achieved. This block persisted following an observation period. At the conclusion of the procedure, all catheters and sheaths were removed and hemostasis obtained by utilizing a suture-mediated closure device. The patient remained hemodynamically stable, tolerated the procedure well and was transferred in stable condition. There were no immediate complications encountered during the procedure. There was minimal blood loss and no specimen were removed. FINDINGS      1. The baseline ECG revealed atrial fibrillation. 2. The device was tested and reprogrammed to a lower rate of 40 bpm.   3. Using an 8mm, non-irrigated bi-directional tip ablation catheter through an SR0 sheath, several applications of RF energy were delivered at the site of the His bundle electrogram until complete heart block was achieved. This CHB persisted following an observation period. 4. The device was tested and reprogrammed to a lower rate of 90 bpm.         RADIOLOGY SUMMARY       Total    Fluoro Time (minutes)  1.7   Radiation Dose (mGy) 23        CONCLUSION     1. Successful AV node ablation  2. Continue anticoagulation indefinitely. 3. Follow up in 4 weeks in EP clinic to initiate rate lowering protocol. 4. Monitor on telemetry until noon tomorrow for ventricular tachycardia. 5. Follow up with Luz Maria Martines MD and Dr. Nawaf Long as scheduled. Thank you, Luz Maria Martines MD and Dr. Raymundo Evans for allowing me to participate in the care of this extraordinarily pleasant male.               Kinga Pacheco MD  Cardiac Electrophysiology / Cardiology    [address] [place] [address] [address] [facility], [address] [facility] [address] [place] [address]  [address]                Mehdi Lewis  [phone] / [phone] Fax   [phone] / [phone] Fax

## 2020-07-20 NOTE — PROGRESS NOTES
Bedside and Verbal shift change report given to Armand Sanchez  (oncoming nurse) by Mark Hi  (offgoing nurse). Report included the following information SBAR and Kardex.

## 2020-07-20 NOTE — ED NOTES
Patient is hypotensive. Dr. Jelani Snowden at the bedside. Patient is asymptomatic. Will continue to monitor.

## 2020-07-20 NOTE — PROGRESS NOTES
[address] Adult  Hospitalist Group                                                                                          Hospitalist Progress Note  Karen Whitley MD        Date of Service:  2020  NAME:  Ad Joseph  :  1945  MRN:  [id number]      Admission Summary:   79-year-old male presented to the emergency room with complaints of racing heart. He was noted to have atrial fibrillation and hypotension  Interval history / Subjective:    No complaints.       Assessment & Plan:     Atrial fibrillation with RVR  -Status post AV carlos ablation today  -Currently sinus rhythm on monitor and blood pressure normal  -Cardiology following, plan to DC tomorrow if no issues with V. tach    Hypotension  -This is probably due to A. fib with RVR and poor perfusion  -Improved after AV carlos ablation    History of aortic valve replacement  -Currently on Coumadin, INR 3.7(goal 2.5-3.5)    History of systolic CHF  -Stable, not in exacerbation  -Continue to monitor volume status  -Entresto and spironolactone was on hold due to hypotension    Elevated creatinine  -Likely from hypoperfusion  -Improved today    Code status: Cardiac  DVT prophylaxis: Coumadin       Hospital Problems  Date Reviewed: 2020          Codes Class Noted POA    Tachycardia ICD-10-CM: R00.0  ICD-9-CM: 785.0  2020 Unknown        Tachy-donal syndrome ([facility] [address] [address] ICD-10-CM: I49.5  ICD-9-CM: 427.81  2020 Unknown        * (Principal) Hypotension ICD-10-CM: I95.9  ICD-9-CM: 458.9  2020 Yes        H/O aortic valve replacement ICD-10-CM: Z95.2  ICD-9-CM: V43.3  2017 Yes    Overview Signed 2017  3:19 PM by Jose David Garcia MD      @ [facility]              CVA (cerebral vascular accident) [facility]) ICD-10-CM: I63.9  ICD-9-CM: 434.91  2017 Yes        Pacemaker (Chronic) ICD-10-CM: Z95.0  ICD-9-CM: V45.01  2017 Yes    Overview Signed 2017 10:55 AM by Kisha Evans     -- unsure if MRI compatible             TIA (transient ischemic attack) ICD-10-CM: G45.9  ICD-9-CM: 435.9  11/27/2017 Yes        CHF (congestive heart failure) (HCC) (Chronic) ICD-10-CM: I50.9  ICD-9-CM: 428.0  11/27/2017 Yes        HTN (hypertension) (Chronic) ICD-10-CM: I10  ICD-9-CM: 401.9  11/27/2017 Yes        Elevated serum creatinine ICD-10-CM: R79.89  ICD-9-CM: 790.99  11/27/2017 Yes        Chronic anticoagulation (Chronic) ICD-10-CM: Z79.01  ICD-9-CM: V58.61  11/27/2017 Yes                Review of Systems:   A comprehensive review of systems was negative except for that written in the HPI. Vital Signs:    Last 24hrs VS reviewed since prior progress note. Most recent are:  Visit Vitals  /81 (BP 1 Location: Right arm, BP Patient Position: At rest)   Pulse 89   Temp 97.9 °F (36.6 °C)   Resp 21   Ht 6' (1.829 m)   Wt 83.9 kg (185 lb)   SpO2 100%   BMI 25.09 kg/m²         Intake/Output Summary (Last 24 hours) at 7/20/2020 1843  Last data filed at 7/20/2020 1458  Gross per 24 hour   Intake 3755.62 ml   Output 500 ml   Net 3255.62 ml        Physical Examination:             Constitutional:  No acute distress, cooperative, pleasant    ENT:  Oral mucosa moist, oropharynx benign. Resp:  CTA bilaterally. No wheezing/rhonchi/rales. No accessory muscle use   CV:  Regular rhythm, normal rate, no murmurs, gallops, rubs    GI:  Soft, non distended, non tender. normoactive bowel sounds, no hepatosplenomegaly     Musculoskeletal:  No edema, warm, 2+ pulses throughout    Neurologic:  Moves all extremities. AAOx3, CN II-XII reviewed     Psych:  Good insight, Not anxious nor agitated.        Data Review:    Review and/or order of clinical lab test  Decision to obtain old records and/or obtain history from someone other than the patient      Labs:     Recent Labs     07/20/20  0411 07/19/20  1705   WBC 4.5 5.4   HGB 12.4 13.4   HCT 40.1 41.2    183     Recent Labs     07/20/20  0411 07/19/20  1705    139   K 4.0 4.2   * 109*   CO2 21 24   BUN 14 19   CREA 0.97 1.34*   * 139*   CA 8.0* 8.8   MG 2.1 2.0     Recent Labs     07/19/20  1705   ALT 55   AP 86   TBILI 1.0   TP 7.0   ALB 3.9   GLOB 3.1     Recent Labs     07/20/20  0411 07/19/20  1705   INR 3.7* 3.6*   PTP 34.4* 33.4*      No results for input(s): FE, TIBC, PSAT, FERR in the last 72 hours. No results found for: FOL, RBCF   No results for input(s): PH, PCO2, PO2 in the last 72 hours.   Recent Labs     07/19/20  1705   TROIQ 0.05*     Lab Results   Component Value Date/Time    Cholesterol, total 135 11/28/2017 05:04 AM    HDL Cholesterol 24 11/28/2017 05:04 AM    LDL, calculated 48.8 11/28/2017 05:04 AM    Triglyceride 311 (H) 11/28/2017 05:04 AM    CHOL/HDL Ratio 5.6 (H) 11/28/2017 05:04 AM     Lab Results   Component Value Date/Time    Glucose (POC) 98 11/27/2017 08:23 PM    Glucose (POC) 94 11/27/2017 08:08 PM     Lab Results   Component Value Date/Time    Color YELLOW/STRAW 07/19/2020 07:05 PM    Appearance CLEAR 07/19/2020 07:05 PM    Specific gravity 1.012 07/19/2020 07:05 PM    pH (UA) 6.0 07/19/2020 07:05 PM    Protein Negative 07/19/2020 07:05 PM    Glucose Negative 07/19/2020 07:05 PM    Ketone Negative 07/19/2020 07:05 PM    Bilirubin Negative 07/19/2020 07:05 PM    Urobilinogen 0.2 07/19/2020 07:05 PM    Nitrites Negative 07/19/2020 07:05 PM    Leukocyte Esterase Negative 07/19/2020 07:05 PM    Epithelial cells FEW 07/19/2020 07:05 PM    Bacteria Negative 07/19/2020 07:05 PM    WBC 0-4 07/19/2020 07:05 PM    RBC 0-5 07/19/2020 07:05 PM         Medications Reviewed:     Current Facility-Administered Medications   Medication Dose Route Frequency    esmolol 10mg/mL (BREVIBLOC) infusion  50 mcg/kg/min IntraVENous TITRATE    sodium chloride (NS) flush 5-40 mL  5-40 mL IntraVENous PRN    sodium chloride (NS) flush 5-40 mL  5-40 mL IntraVENous Q8H    acetaminophen (TYLENOL) tablet 650 mg  650 mg Oral Q4H PRN    naloxone (NARCAN) injection 0.4 mg  0.4 mg IntraVENous PRN    ondansetron (ZOFRAN) injection 4 mg  4 mg IntraVENous Q4H PRN    Warfarin pharmacy to dose   Other Rx Dosing/Monitoring     ______________________________________________________________________  EXPECTED LENGTH OF STAY: 2d 9h  ACTUAL LENGTH OF STAY:          Rafaela Lima MD

## 2020-07-20 NOTE — PROGRESS NOTES
7/20/2020  8:40 AM  CM attempted to complete assessment, MD at bedside, placed TC to pt's wife Almita (C) [phone], lvm  Will attempt again  [place]

## 2020-07-20 NOTE — PROGRESS NOTES
TRANSFER - IN REPORT:    Verbal report received from DELL VENCES [facility] RN(name) on [employer]  being received from EP lab(unit) for routine progression of care      Report consisted of patients Situation, Background, Assessment and   Recommendations(SBAR). Information from the following report(s) Procedure Summary was reviewed with the receiving nurse. Opportunity for questions and clarification was provided. Assessment completed upon patients arrival to unit and care assumed. 1308: Patient received from EP lab. Site to right groin clean, dry and intact. Pulses palpable. No hematoma. VS stable. Patient with no complaints at this time. HOB flat    1415: HOB elevated to 30 degrees. Site to right groin clean, dry and intact. Pulses palpable. No hematoma. VS stable. Patient with no complaints at this time. 1450: HOB elevated to 45 degrees. Site to right groin clean, dry and intact. Pulses palpable. No hematoma. VS stable. Patient with no complaints at this time. TRANSFER - OUT REPORT:    Verbal report given to Mariia RN(name) on [employer]  being transferred to [facility] for routine progression of care       Report consisted of patients Situation, Background, Assessment and   Recommendations(SBAR). Information from the following report(s) SBAR, Kardex, Intake/Output, MAR and Recent Results was reviewed with the receiving nurse.     Lines:   Peripheral IV 07/19/20 Right Antecubital (Active)   Site Assessment Clean, dry, & intact 07/20/20 0747   Phlebitis Assessment 0 07/20/20 0747   Infiltration Assessment 0 07/20/20 0747   Dressing Status Clean 07/20/20 0747   Dressing Type Tape;Transparent 07/20/20 0747   Hub Color/Line Status Pink;Patent 07/20/20 0747   Action Taken Blood drawn 07/19/20 1709       Peripheral IV 07/19/20 Left Antecubital (Active)   Site Assessment Clean, dry, & intact 07/20/20 0747   Phlebitis Assessment 0 07/20/20 0747   Infiltration Assessment 0 07/20/20 0747   Dressing Status Clean, dry, & intact 07/20/20 0747   Dressing Type Tape;Transparent 07/20/20 0747   Hub Color/Line Status Pink;Flushed 07/20/20 0747   Action Taken Blood drawn 07/20/20 9687        Opportunity for questions and clarification was provided.       Patient transported with:   Monitor  Registered Nurse     Updated patient's wife of patient's status

## 2020-07-20 NOTE — ED NOTES
Bedside shift change report given to [address] [place] [address]   (oncoming nurse) by Jose Miguel Gilmore RN (offgoing nurse).  Report included the following information SBAR, Kardex, Intake/Output, MAR, Recent Results and Cardiac Rhythm Afib

## 2020-07-20 NOTE — ED NOTES
Visit Vitals  BP (!) 89/67   Pulse (!) 144   Temp 98.2 °F (36.8 °C)   Resp 18   Ht 6' (1.829 m)   Wt 83.9 kg (185 lb)   SpO2 96%   BMI 25.09 kg/m²       0525 Patient on 2.5 cardizem, patient remains hypotensive, Dr. Keshawn Ott paged   2022 Dr Keshawn Ott returned page, stop cardizem at this time, patient may need cardioversion/ablation this afternoon, give 250 mcg/kg esmolol bolus, start esmolol drip

## 2020-07-20 NOTE — ED NOTES
Shift change report given to Selina Toure RN  (oncoming nurse) by Sandoval Encarnacion RN (offgoing nurse).  Report included the following information SBAR, Kardex, Intake/Output, MAR, Recent Results    Patient is to be Step Down hold in ED   Placed on hospital bed for comfort, nonslip socks applied, urinal at bedside, call bell within reach, advised to call for assistance

## 2020-07-20 NOTE — PROGRESS NOTES
[facility] Dosing Services: 7/20/20    Consult for Warfarin Dosing by Pharmacy by Dr. Samuel Smith provided for this 69 y.o.  male , for indication of Mechanical AVR  Day of Therapy Continued from home. The patient takes 2.5 mg in the evening on Monday, Wednesday, Thursday, Friday, Saturday, Sunday. On Tuesday the patient takes 3.75 mg. Dose to achieve an INR goal of 2.5- 3.5    Order entered for  Warfarin  2 (mg) ordered to be given today at 18:00. Significant drug interactions:   Previous dose given 2 mg on 7/19   PT/INR Lab Results   Component Value Date/Time    INR 3.7 (H) 07/20/2020 04:11 AM      Platelets Lab Results   Component Value Date/Time    PLATELET 989 47/50/5352 04:11 AM      H/H Lab Results   Component Value Date/Time    HGB 12.4 07/20/2020 04:11 AM        Pharmacy to follow daily and will provide subsequent Warfarin dosing based on clinical status.   Cynthia Tomas, [address])  Contact information [phone]

## 2020-07-20 NOTE — PROGRESS NOTES
7/20/2020  9:01 AM  CM completed assessment w/ pt in person, pt, wife and CM wore masks at all times. Charted demographics verified, pt lives w/ wife in 1 story home, there are 3 entry steps w/ handrails    Reason for Admission:   Hypotension  Pt is 68 yo  male who [facility] for \"racing heart\" , was found to be hypotensive, pt has extensive cardiac history        PMHx: CHF, PAFib, cardiomyopathy, pacemaker, ICD, AVR, TIA   RUR Score:     13 % Moderate Risk    PCP: First and Last name: Filiberto Dow MD   [facility] of Practice: [facility]   Are you a current patient: Yes/No:YES   Approximate date of last visit:    Can you do a virtual visit with your PCP: YES             Resources/supports as identified by patient/family:                   Top Challenges facing patient (as identified by patient/family and CM): Finances/Medication cost? [employer] or [employer]? Pt drives, wife can assist               Support system or lack thereof? Pt lives w/ wife who is supportive and can assist pt                     Living arrangements? Pt lives w/ wife in 1 story home, there are 3 entry steps w/ handrails             Self-care/ADLs/Cognition? Pt reports to be ambulatory, iADLs,          Current Advanced Directive/Advance Care Plan:  Pt has AMD on file, wife Almita Cuellar (c) [phone] is surrogate                          Plan for utilizing home health:    Pt had HH previously, agreeable if indicated for d/c  DME: None               Transition of Care Plan:     RUR 13%  Moderate Risk d/t co-morbidities           1. Hospital admission for medical management, cardiology consult  2. CM to follow through for treatment/response  3. D/C when stable to home w/ family assistance HH vs. None  4. Outpatient f/u PCP, cardiology  5.  Wife will transport at d/c    Care Management Interventions  PCP Verified by CM: Luz Cartwright, [address], last visit <30 days)  Palliative Care Criteria Met (RRAT>21 & CHF Dx)?: No  Mode of Transport at Discharge: Self(wife)  Physical Therapy Consult: No  Occupational Therapy Consult: No  Speech Therapy Consult: No  Current Support Network: Lives with Spouse, Own Home(pt lives w/ wife in pvt residence, reports to be ambulatory, iADLs, drives, wife can assist)  Confirm Follow Up Transport: Family  Discharge Location  Discharge Placement: Home with outpatient services  Mable Martinez

## 2020-07-21 ENCOUNTER — APPOINTMENT (OUTPATIENT)
Dept: NON INVASIVE DIAGNOSTICS | Age: 75
DRG: 274 | End: 2020-07-21
Attending: INTERNAL MEDICINE
Payer: MEDICARE

## 2020-07-21 ENCOUNTER — APPOINTMENT (OUTPATIENT)
Dept: NON INVASIVE DIAGNOSTICS | Age: 75
DRG: 274 | End: 2020-07-21
Attending: NURSE PRACTITIONER
Payer: MEDICARE

## 2020-07-21 LAB
ANION GAP SERPL CALC-SCNC: 6 MMOL/L (ref 5–15)
BASOPHILS # BLD: 0 K/UL (ref 0–0.1)
BASOPHILS NFR BLD: 0 % (ref 0–1)
BUN SERPL-MCNC: 17 MG/DL (ref 6–20)
BUN/CREAT SERPL: 13 (ref 12–20)
CALCIUM SERPL-MCNC: 8.1 MG/DL (ref 8.5–10.1)
CHLORIDE SERPL-SCNC: 116 MMOL/L (ref 97–108)
CO2 SERPL-SCNC: 18 MMOL/L (ref 21–32)
CREAT SERPL-MCNC: 1.26 MG/DL (ref 0.7–1.3)
DIFFERENTIAL METHOD BLD: ABNORMAL
ECHO AR MAX VEL PISA: 204.16 CENTIMETER/SECOND
ECHO AV MEAN GRADIENT: 3.75 MMHG
ECHO AV PEAK GRADIENT: 6.42 MMHG
ECHO AV PEAK VELOCITY: 126.71 CM/S
ECHO AV REGURGITANT PHT: 0.93 S
ECHO AV VTI: 23.86 CM
ECHO LVOT PEAK GRADIENT: 0.99 MMHG
ECHO LVOT PEAK VELOCITY: 49.87 CM/S
ECHO LVOT VTI: 8.84 CM
EOSINOPHIL # BLD: 0 K/UL (ref 0–0.4)
EOSINOPHIL NFR BLD: 1 % (ref 0–7)
ERYTHROCYTE [DISTWIDTH] IN BLOOD BY AUTOMATED COUNT: 15.4 % (ref 11.5–14.5)
GLUCOSE SERPL-MCNC: 102 MG/DL (ref 65–100)
HCT VFR BLD AUTO: 43 % (ref 36.6–50.3)
HGB BLD-MCNC: 13.6 G/DL (ref 12.1–17)
IMM GRANULOCYTES # BLD AUTO: 0 K/UL (ref 0–0.04)
IMM GRANULOCYTES NFR BLD AUTO: 0 % (ref 0–0.5)
INR PPP: 5.6 (ref 0.9–1.1)
LVOT MG: 0.57 MMHG
LYMPHOCYTES # BLD: 1.5 K/UL (ref 0.8–3.5)
LYMPHOCYTES NFR BLD: 18 % (ref 12–49)
MAGNESIUM SERPL-MCNC: 1.8 MG/DL (ref 1.6–2.4)
MCH RBC QN AUTO: 29.9 PG (ref 26–34)
MCHC RBC AUTO-ENTMCNC: 31.6 G/DL (ref 30–36.5)
MCV RBC AUTO: 94.5 FL (ref 80–99)
MONOCYTES # BLD: 0.7 K/UL (ref 0–1)
MONOCYTES NFR BLD: 8 % (ref 5–13)
NEUTS SEG # BLD: 5.8 K/UL (ref 1.8–8)
NEUTS SEG NFR BLD: 73 % (ref 32–75)
NRBC # BLD: 0 K/UL (ref 0–0.01)
NRBC BLD-RTO: 0 PER 100 WBC
PLATELET # BLD AUTO: 200 K/UL (ref 150–400)
PMV BLD AUTO: 10.5 FL (ref 8.9–12.9)
POTASSIUM SERPL-SCNC: 5.1 MMOL/L (ref 3.5–5.1)
PROTHROMBIN TIME: 50.1 SEC (ref 9–11.1)
RBC # BLD AUTO: 4.55 M/UL (ref 4.1–5.7)
SODIUM SERPL-SCNC: 140 MMOL/L (ref 136–145)
WBC # BLD AUTO: 8 K/UL (ref 4.1–11.1)

## 2020-07-21 PROCEDURE — 74011250637 HC RX REV CODE- 250/637: Performed by: INTERNAL MEDICINE

## 2020-07-21 PROCEDURE — B246ZZ4 ULTRASONOGRAPHY OF RIGHT AND LEFT HEART, TRANSESOPHAGEAL: ICD-10-PCS | Performed by: SPECIALIST

## 2020-07-21 PROCEDURE — 74011250636 HC RX REV CODE- 250/636: Performed by: NURSE PRACTITIONER

## 2020-07-21 PROCEDURE — 85025 COMPLETE CBC W/AUTO DIFF WBC: CPT

## 2020-07-21 PROCEDURE — 93312 ECHO TRANSESOPHAGEAL: CPT

## 2020-07-21 PROCEDURE — 74011000258 HC RX REV CODE- 258: Performed by: NURSE PRACTITIONER

## 2020-07-21 PROCEDURE — 85610 PROTHROMBIN TIME: CPT

## 2020-07-21 PROCEDURE — 80048 BASIC METABOLIC PNL TOTAL CA: CPT

## 2020-07-21 PROCEDURE — 92960 CARDIOVERSION ELECTRIC EXT: CPT

## 2020-07-21 PROCEDURE — 5A2204Z RESTORATION OF CARDIAC RHYTHM, SINGLE: ICD-10-PCS | Performed by: INTERNAL MEDICINE

## 2020-07-21 PROCEDURE — 83735 ASSAY OF MAGNESIUM: CPT

## 2020-07-21 PROCEDURE — 74011250636 HC RX REV CODE- 250/636: Performed by: INTERNAL MEDICINE

## 2020-07-21 PROCEDURE — 65610000006 HC RM INTENSIVE CARE

## 2020-07-21 PROCEDURE — 74011000250 HC RX REV CODE- 250: Performed by: INTERNAL MEDICINE

## 2020-07-21 PROCEDURE — 36415 COLL VENOUS BLD VENIPUNCTURE: CPT

## 2020-07-21 RX ORDER — FLUMAZENIL 0.1 MG/ML
0.2 INJECTION INTRAVENOUS AS NEEDED
Status: DISCONTINUED | OUTPATIENT
Start: 2020-07-21 | End: 2020-07-21

## 2020-07-21 RX ORDER — FENTANYL CITRATE 50 UG/ML
100 INJECTION, SOLUTION INTRAMUSCULAR; INTRAVENOUS
Status: COMPLETED | OUTPATIENT
Start: 2020-07-21 | End: 2020-07-21

## 2020-07-21 RX ORDER — AMIODARONE HYDROCHLORIDE 200 MG/1
400 TABLET ORAL 2 TIMES DAILY
Status: DISCONTINUED | OUTPATIENT
Start: 2020-07-21 | End: 2020-07-22

## 2020-07-21 RX ORDER — MIDAZOLAM HYDROCHLORIDE 1 MG/ML
5 INJECTION, SOLUTION INTRAMUSCULAR; INTRAVENOUS
Status: COMPLETED | OUTPATIENT
Start: 2020-07-21 | End: 2020-07-21

## 2020-07-21 RX ADMIN — FENTANYL CITRATE 100 MCG: 50 INJECTION, SOLUTION INTRAMUSCULAR; INTRAVENOUS at 09:33

## 2020-07-21 RX ADMIN — Medication 10 ML: at 06:09

## 2020-07-21 RX ADMIN — ESMOLOL HYDROCHLORIDE 50 MCG/KG/MIN: 10 INJECTION INTRAVENOUS at 03:58

## 2020-07-21 RX ADMIN — Medication 10 ML: at 21:55

## 2020-07-21 RX ADMIN — SODIUM CHLORIDE 500 ML: 900 INJECTION, SOLUTION INTRAVENOUS at 01:45

## 2020-07-21 RX ADMIN — Medication 10 ML: at 03:59

## 2020-07-21 RX ADMIN — PHYTONADIONE 5 MG: 10 INJECTION, EMULSION INTRAMUSCULAR; INTRAVENOUS; SUBCUTANEOUS at 13:08

## 2020-07-21 RX ADMIN — AMIODARONE HYDROCHLORIDE 150 MG: 50 INJECTION, SOLUTION INTRAVENOUS at 15:04

## 2020-07-21 RX ADMIN — MIDAZOLAM HYDROCHLORIDE 3 MG: 1 INJECTION, SOLUTION INTRAMUSCULAR; INTRAVENOUS at 09:33

## 2020-07-21 RX ADMIN — AMIODARONE HYDROCHLORIDE 0.5 MG/MIN: 50 INJECTION, SOLUTION INTRAVENOUS at 21:11

## 2020-07-21 RX ADMIN — AMIODARONE HYDROCHLORIDE 1 MG/MIN: 50 INJECTION, SOLUTION INTRAVENOUS at 15:03

## 2020-07-21 NOTE — PROGRESS NOTES
1907 Received report. 2100 Patient resting.  2300 No c/o discomforts.  0722 Bedside shift change report given to [facility] . Report included the following information SBAR, Kardex, ED Summary, Procedure Summary, Intake/Output, MAR, Accordion, Recent Results, Med Rec Status and Cardiac Rhythm ST/at fib /v tach/ paced. Shirley Babcock

## 2020-07-21 NOTE — PROGRESS NOTES
Shift Change:     Bedside and Verbal shift change report given to Ntetie Villalta (oncoming nurse) by Janie Pineda (offgoing nurse). Report included the following information SBAR, Kardex, Procedure Summary and Recent Results. Shift Summary:    2015: Pnt has heart rate sustaining above 130. Cardio paged. 2030: No call yet from Cardio. Paged again. 2038: No call yet from Cardio. Paged again. 2025: Dr. Ian Klein on call. Updated on patient condition and order received to restart Esmolol drip per the previous order. Order to transfer patient to ICU d/t [facility] not being able to titrate an Esmolol drip. Supervisor updated and working on room assignment. 2126: Patient transferred to ICU with Wilmer Michael RN and Lonny Sandoval. Bedside and Verbal shift change report given to Aramis Sanchez (oncoming nurse) by Nettie Villalta (offgoing nurse). Report included the following information SBAR, Kardex, Procedure Summary and Recent Results.

## 2020-07-21 NOTE — PROGRESS NOTES
Dr. Stiles Smaller notified for increase in esmolol gtt and decrease in blood pressure, please see MAR for order. Will continue to monitor patient.

## 2020-07-21 NOTE — PROGRESS NOTES
Spiritual Care Assessment/Progress Note  [facility]      NAME: Jimmy Rosenberg      MRN: [id number]  AGE: 69 y.o. SEX: male  Druze Affiliation: Sikh   Language: English     7/21/2020     Total Time (in minutes): 5     Spiritual Assessment begun in [facility] INTENSIVE CARE through conversation with:         [x]Patient        [x] Family    [] Friend(s)        Reason for Consult: [facility]     Spiritual beliefs: (Please include comment if needed)     [x] Identifies with a coco tradition: Sikh        [x] Supported by a coco community:            [] Claims no spiritual orientation:           [] Seeking spiritual identity:                [] Adheres to an individual form of spirituality:           [] Not able to assess:                           Identified resources for coping:      [x] Prayer                               [x] Music                  [] Guided Imagery     [x] Family/friends                 [] Pet visits     [] Devotional reading                         [] Unknown     [] Other:                                              Interventions offered during this visit: (See comments for more details)    Patient Interventions: Affirmation of coco, Communion (Sikh), Initial/Spiritual assessment, Critical care, Prayer (actual), Prayer (assurance of)     Family/Friend(s):  Affirmation of coco, Communion (Sikh), Prayer (actual), Prayer (assurance of)     Plan of Care:     [x] Support spiritual and/or cultural needs    [] Support AMD and/or advance care planning process      [] Support grieving process   [] Coordinate Rites and/or Rituals    [] Coordination with community clergy   [] No spiritual needs identified at this time   [] Detailed Plan of Care below (See Comments)  [] Make referral to Music Therapy  [] Make referral to Pet Therapy     [] Make referral to Addiction services  [] Make referral to [facility]  [] Make referral to Spiritual Care Partner  [] No future visits requested        [] Follow up visits as needed     Comments: Mr. Johney Severance was asleep and  Did not wake up during the visit. Spoke with his wife who is hoping he can go home soon. She declined communion today. No further needs identified at this time. Will follow-up on Thursday if Mr Johney Severance is still in [facility].     JADA Ruiz, RN, ACSW, LCSW   Page:  [phone])

## 2020-07-21 NOTE — PROGRESS NOTES
[address] Adult  Hospitalist Group                                                                                          Hospitalist Progress Note  Sirena Gu MD        Date of Service:  2020  NAME:  Alfonso Li  :  1945  MRN:  [id number]      Admission Summary:   82-year-old male presented to the emergency room with complaints of racing heart. He was noted to have atrial fibrillation and hypotension  Interval history / Subjective:    No complaints, feels tired, converted back to afib rvr last night, and started on esmolol gtt.       Assessment & Plan:     Atrial fibrillation with RVR  -Status post AV carlos ablation   -reverted back to afib overnight  -Cardiology following, plan for ABELARDO/cardioversion today    Hypotension  -This is probably due to A. fib with RVR and poor perfusion/esmolol gtt    History of aortic valve replacement  -Currently on Coumadin, INR 5.7 (goal 2.5-3.5)  -hold coumadin today    History of systolic CHF  -Stable, not in exacerbation  -Continue to monitor volume status  -Entresto and spironolactone was on hold due to hypotension    Elevated creatinine  -Likely from hypoperfusion  -Improved today    Code status: Cardiac  DVT prophylaxis: Coumadin       Hospital Problems  Date Reviewed: 2020          Codes Class Noted POA    Tachycardia ICD-10-CM: R00.0  ICD-9-CM: 785.0  2020 Unknown        Tachy-donal syndrome ([facility] [address] ICD-10-CM: I49.5  ICD-9-CM: 427.81  2020 Unknown        * (Principal) Hypotension ICD-10-CM: I95.9  ICD-9-CM: 458.9  2020 Yes        H/O aortic valve replacement ICD-10-CM: Z95.2  ICD-9-CM: V43.3  2017 Yes    Overview Signed 2017  3:19 PM by Chacorta Hameed MD      @ [facility]              CVA (cerebral vascular accident) [facility]) ICD-10-CM: I63.9  ICD-9-CM: 434.91  2017 Yes        Pacemaker (Chronic) ICD-10-CM: Z95.0  ICD-9-CM: V45.01  2017 Yes    Overview Signed 2017 10:55 AM by Kerri Garcia     -- unsure if MRI compatible             TIA (transient ischemic attack) ICD-10-CM: G45.9  ICD-9-CM: 435.9  11/27/2017 Yes        CHF (congestive heart failure) (HCC) (Chronic) ICD-10-CM: I50.9  ICD-9-CM: 428.0  11/27/2017 Yes        HTN (hypertension) (Chronic) ICD-10-CM: I10  ICD-9-CM: 401.9  11/27/2017 Yes        Elevated serum creatinine ICD-10-CM: R79.89  ICD-9-CM: 790.99  11/27/2017 Yes        Chronic anticoagulation (Chronic) ICD-10-CM: Z79.01  ICD-9-CM: V58.61  11/27/2017 Yes                Review of Systems:   A comprehensive review of systems was negative except for that written in the HPI. Vital Signs:    Last 24hrs VS reviewed since prior progress note. Most recent are:  Visit Vitals  BP (!) 87/77   Pulse (!) 124   Temp 97.2 °F (36.2 °C)   Resp 19   Ht 6' (1.829 m)   Wt 93.1 kg (205 lb 4 oz)   SpO2 95%   BMI 27.84 kg/m²         Intake/Output Summary (Last 24 hours) at 7/21/2020 7786  Last data filed at 7/21/2020 0800  Gross per 24 hour   Intake 1409.56 ml   Output 150 ml   Net 1259.56 ml        Physical Examination:             Constitutional:  No acute distress, cooperative, pleasant    ENT:  Oral mucosa moist, oropharynx benign. Resp:  CTA bilaterally. No wheezing/rhonchi/rales. No accessory muscle use   CV:  Regular rhythm, normal rate, no murmurs, gallops, rubs    GI:  Soft, non distended, non tender. normoactive bowel sounds, no hepatosplenomegaly     Musculoskeletal:  No edema, warm, 2+ pulses throughout    Neurologic:  Moves all extremities. AAOx3, CN II-XII reviewed     Psych:  Good insight, Not anxious nor agitated.        Data Review:    Review and/or order of clinical lab test  Decision to obtain old records and/or obtain history from someone other than the patient      Labs:     Recent Labs     07/21/20  0601 07/20/20  0411   WBC 8.0 4.5   HGB 13.6 12.4   HCT 43.0 40.1    154     Recent Labs     07/21/20  0601 07/20/20  0411 07/19/20  1705    142 139   K 5.1 4.0 4.2   * 116* 109*   CO2 18* 21 24   BUN 17 14 19   CREA 1.26 0.97 1.34*   * 101* 139*   CA 8.1* 8.0* 8.8   MG 1.8 2.1 2.0     Recent Labs     07/19/20  1705   ALT 55   AP 86   TBILI 1.0   TP 7.0   ALB 3.9   GLOB 3.1     Recent Labs     07/21/20  0601 07/20/20  0411 07/19/20  1705   INR 5.6* 3.7* 3.6*   PTP 50.1* 34.4* 33.4*      No results for input(s): FE, TIBC, PSAT, FERR in the last 72 hours. No results found for: FOL, RBCF   No results for input(s): PH, PCO2, PO2 in the last 72 hours.   Recent Labs     07/19/20  1705   TROIQ 0.05*     Lab Results   Component Value Date/Time    Cholesterol, total 135 11/28/2017 05:04 AM    HDL Cholesterol 24 11/28/2017 05:04 AM    LDL, calculated 48.8 11/28/2017 05:04 AM    Triglyceride 311 (H) 11/28/2017 05:04 AM    CHOL/HDL Ratio 5.6 (H) 11/28/2017 05:04 AM     Lab Results   Component Value Date/Time    Glucose (POC) 98 11/27/2017 08:23 PM    Glucose (POC) 94 11/27/2017 08:08 PM     Lab Results   Component Value Date/Time    Color YELLOW/STRAW 07/19/2020 07:05 PM    Appearance CLEAR 07/19/2020 07:05 PM    Specific gravity 1.012 07/19/2020 07:05 PM    pH (UA) 6.0 07/19/2020 07:05 PM    Protein Negative 07/19/2020 07:05 PM    Glucose Negative 07/19/2020 07:05 PM    Ketone Negative 07/19/2020 07:05 PM    Bilirubin Negative 07/19/2020 07:05 PM    Urobilinogen 0.2 07/19/2020 07:05 PM    Nitrites Negative 07/19/2020 07:05 PM    Leukocyte Esterase Negative 07/19/2020 07:05 PM    Epithelial cells FEW 07/19/2020 07:05 PM    Bacteria Negative 07/19/2020 07:05 PM    WBC 0-4 07/19/2020 07:05 PM    RBC 0-5 07/19/2020 07:05 PM         Medications Reviewed:     Current Facility-Administered Medications   Medication Dose Route Frequency    amiodarone (CORDARONE) tablet 400 mg  400 mg Oral BID    fentaNYL citrate (PF) injection 100 mcg  100 mcg IntraVENous ON CALL    midazolam (PF) (VERSED) injection 5 mg  5 mg IntraVENous ON CALL    sodium chloride (NS) flush 5-40 mL  5-40 mL IntraVENous PRN    esmolol 10mg/mL (BREVIBLOC) infusion  0-200 mcg/kg/min IntraVENous TITRATE    sodium chloride (NS) flush 5-40 mL  5-40 mL IntraVENous Q8H    acetaminophen (TYLENOL) tablet 650 mg  650 mg Oral Q4H PRN    naloxone (NARCAN) injection 0.4 mg  0.4 mg IntraVENous PRN    ondansetron (ZOFRAN) injection 4 mg  4 mg IntraVENous Q4H PRN    Warfarin pharmacy to dose   Other Rx Dosing/Monitoring     ______________________________________________________________________  EXPECTED LENGTH OF STAY: 2d 9h  ACTUAL LENGTH OF STAY:          2                 Gal Jimenez MD

## 2020-07-21 NOTE — PROGRESS NOTES
Problem: Falls - Risk of  Goal: *Absence of Falls  Description: Document Leslee Winter Fall Risk and appropriate interventions in the flowsheet.   Outcome: Progressing Towards Goal  Note: Fall Risk Interventions:  Mobility Interventions: Bed/chair exit alarm, OT consult for ADLs, Patient to call before getting OOB, PT Consult for mobility concerns, PT Consult for assist device competence         Medication Interventions: Assess postural VS orthostatic hypotension, Bed/chair exit alarm, Evaluate medications/consider consulting pharmacy, Patient to call before getting OOB, Teach patient to arise slowly    Elimination Interventions: Bed/chair exit alarm, Call light in reach, Patient to call for help with toileting needs, Urinal in reach, Toileting schedule/hourly rounds              Problem: Patient Education: Go to Patient Education Activity  Goal: Patient/Family Education  Outcome: Progressing Towards Goal

## 2020-07-21 NOTE — PROCEDURES
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 See full ABELARDO note.   Complications: none

## 2020-07-21 NOTE — PROGRESS NOTES
Report received from [facility] RN, Khloe Jiménez. Will start esmolol gtt per order. All questions answered. Will continue to monitor patient.

## 2020-07-21 NOTE — ADVANCED PRACTICE NURSE
D/W case with Dr. Lida Wood: plan for ABELARDO/cardio version  this am with Dr. Buddy Bethea. Reviewed with pt he agrees to proceed. Nursing will obtain written consents.

## 2020-07-21 NOTE — PROGRESS NOTES
0700- Bedside shift change report given to [address] [place] [address] [address] (oncoming nurse) by Keo Aviles RN (offgoing nurse). Report included the following information SBAR, Kardex, Intake/Output, MAR, Accordion, Med Rec Status and Cardiac Rhythm paced. Verified drips with off going nurse. Esmolol 75 mcg     0730- Pt c/o of being short of breath oxygen levels remain at 96%, placed pt on 2 liters NC for comfort. HR remains in 120's, increased esmolol to 100 mcg. Attempted to page cardiology, no answer. 46- Spoke with Connor Dial about pt, she said she will get in touch with the NP for Dr. Julee Campoverde about his HR. Pt OOB into chair, tolerated well. No sob or dizziness associated. 0800- Shift assessment complete. Pt alert and oriented, following all commands. Neuro status intact. Pulses palpable with warm extremities. Lung sounds clear bilaterally, pt is on 2 liters NC satting 98%. Pt is paced on the monitor, . Pt has right groin site clean dry and intact. Abdomen is soft and intact with active bowel sounds. Pt has not yet voided but has urinal at bedside. 0520Josicolin Martinez with NP for Dr. Julee Campoverde, may plan for a cardioversion. She said she will come to bedside to discuss with pt.    0830- Will proceed with ABELARDO/cardioversion today with Dr. Mingo Bryant. 135 [address]- Wife at bedside. 0253- Medication scanned into the computer- fentanyl and versed. Pt placed on 4 liters NC for comfort during the procedure. Suction containers hooked up, ambu bag ready for use, and code cart at bedside. 0940- Timeout performed. Dr. Mingo Bryant, echo technician, myself, and 2 RN's Sheri ERIC and Elsie WHITTAKER at bedside. Lidocaine jelly given to pt to numb throat. 5763- First dose of medication given- 25 mcg of fentanyl and 1 mg of versed    0945- 25 mcg of fentanyl and 1 mg of versed given. 7160- Dr. Mingo Bryant started ABELARDO with assist of echo technician.     2736- Esmolol turned off per Dr. Mingo Bryant    0950- 25 mcg of fentanyl given    0956- 25 mcg fentanyl and 1 mg of versed given. 2490- 1st shock administered, synced and charged at 200 joules    1001- 2nd shock administered, synced and charged at 200 joules    1005- Dr. Corby Navarro at bedside, shock attempts were unsuccessful. 1030- Pt resting comfortably in bed, no signs of distress noted. Wife back at bedside. 1140- Technician at bedside integrating pacer. States pt has some episodes of slow VT as his underlying rhythm. 1200- Re-assessment complete, no changes noted. Pt sleeping in bed, arouses easily to stimuli. Pt HR remaining in the 90's.    1300- Pt awake and alert. Tested pt swallow ability after lidocaine jell to throat, passed. Provided pt with lunch tray. INR 5.6, gave pt vitamin K 5 mg to bring it down. Will hold coumadin today per cardiology. 1500- Started pt on amio bolus and amio drip at 1 mcg per [facility] order. Pt's HR back in the 120's, with signs of slow VT.    1600- Re-assessment complete, no changes noted. BP tolerating amio, HR remains 120's. Wife remains at bedside. 1700- Pt resting in bed.    1900- Bedside shift change report given to Juana PRESSLEY (oncoming nurse) by Carol Luna (offgoing nurse). Report included the following information SBAR, Kardex, Intake/Output, Accordion, Med Rec Status and Cardiac Rhythm Paced.

## 2020-07-21 NOTE — ADT AUTH CERT NOTES
Cardiology [facility] Day 2(7/21/2020) by Eun Epstein         Review Status  Review Entered    Completed  7/21/2020 13:18        Criteria Review       Care Day: 3 Care Date: 7/21/2020 Level of Care: ICU    Guideline Day 3    Level Of Care    (X) * Activity level acceptable    7/21/2020 13:18:19 EDT by Sol Cortes      Ambulate with assistance    Clinical Status    ( ) * Hemodynamic stability    ( ) * Cardiovascular status acceptable    ( ) * Cardiac inflammation absent or controlled    ( ) * General Discharge Criteria met    Interventions    (X) * Intake acceptable    7/21/2020 13:18:19 EDT by Sol Cortes      Cardiac diet    ( ) * No inpatient interventions needed    * Milestone    Additional Notes    97.4-132/-% 3 NC    74-year-old male presented to the emergency room with complaints of racing heart. He was noted to have atrial fibrillation and hypotension     No complaints, feels tired, converted back to afib rvr last night, and started on esmolol gtt. Atrial fibrillation with RVR    -Status post AV carlos ablation 7/20    -reverted back to afib overnight    -Cardiology following, plan for ABELARDO/cardioversion today         Hypotension    -This is probably due to A. fib with RVR and poor perfusion/esmolol gtt         History of aortic valve replacement    -Currently on Coumadin, INR 5.7 (goal 2.5-3.5)    -hold coumadin today         History of systolic CHF    -Stable, not in exacerbation    -Continue to monitor volume status    -Entresto and spironolactone was on hold due to hypotension         Elevated creatinine    -Likely from hypoperfusion    -Improved today         Code status: Cardiac    DVT prophylaxis: Coumadin    Start Amiodarone 400mg BID po. Will plan for ABELARDO/DCCV today with Dr. Murali Bazan. Discussed repeat AV carlos ablation this week with patient and he is amenable. Will discuss timing (most likely this week) with Dr. Tamiko Aponte further.        Physical Exam:    Constitutional: No acute distress, cooperative, pleasant    ENT: Oral mucosa moist, oropharynx benign. Resp: CTA bilaterally. No wheezing/rhonchi/rales. No accessory muscle use    CV: Regular rhythm, normal rate, no murmurs, gallops, rubs     GI: Soft, non distended, non tender. normoactive bowel sounds, no hepatosplenomegaly     Musculoskeletal: No edema, warm, 2+ pulses throughout     Neurologic: Moves all extremities.  AAOx3, CN II-XII reviewed    Psych:  Good insight, Not anxious nor agitated. 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 See full ABELARDO note. Complications: none       Date of Procedure: 7/21/2020    Preoperative Diagnosis: atrial fibrillation    Postoperative Diagnosis: same    Procedure: Synchronized DC cardioversion    Cardiologist: Jasmyne Michaels MD    Anesthesia:  IV sedation    Estimated Blood Loss: None    Findings: Following ABELARDO demonstrating no LA/MAYDA thrombus, the patient was sedated with additional intravenous midazolam and Fentanyl. 200 joule biphasic shock was delivered twice using anterior and posterior pads with failure to convert atrial fibrillation to sinus rhythm.  No complications were noted.         Complications: none    Unsuccessful attempt x 2 to cardiovert AF to sinus    Elective AVN ablation       INR 5.6 protime 50.1 CO2 18 glu 102 Ca 8.1       NPO effective midnight    Cardiac monitoring    Esmolol drip    Vit K1 5mg po x1    0.9% sod chlor cont ivf bolus 500mL x1           PA recomendation by Bertha Gaston RN         Review Status  Review Entered    In Primary  7/20/2020 15:43        Criteria Review Letter of Determination: Inpatient Status Appropriate    This patient was originally hospitalized as Inpatient Status on 7/19/2020 for  hypotension. This patient is appropriate for Inpatient Admission based on  medical necessity. The patient's stay was medically necessary based on vital  signs significant for a blood pressure of 73/52 mmHg, pulse of 149 beats per  minute, and respiratory rate of 28 breaths per minute. It is our recommendation that this patient's hospitalization status should be  INPATIENT status.      The final decision regarding the patient's hospitalization status depends on the  attending physician's judgement.     Marina Johnston MD, NICHOLAS,   Physician [facility].       Cardiology [address] [facility] (7/20/2020) by Damien Cristobal RN         Review Status  Review Entered    Completed  7/20/2020 14:41        Criteria Review       Care Day: 2 Care Date: 7/20/2020 Level of Care: Telemetry    Guideline Day 2    Level Of Care    (X) Floor    7/20/2020 14:41:49 EDT by David De La Garza Transferred to [facility] bed today    Clinical Status    (X) * No ICU or intermediate care needs    Interventions    (X) Inpatient interventions continue    7/20/2020 14:41:49 EDT by Damien Cristobal      NS 125hr, Breviblock 21mg IV x1, 500ml NS bolus x2, Benadryl 25mg IV x1, Fentanyl 50mcg IV x1, Versed 2mg IV x1, Ancef 2g IV x1    ( ) Transition to oral routes    7/20/2020 14:41:50 EDT by Damien Cristobal      Coumadin 2mg po x1    ( ) Taper off IV medications    7/20/2020 14:41:50 EDT by Edgar Mckeon infusion -  d/c, Joe gtt - d/c    * Milestone    Additional Notes    7/20/20         97.9-141-23, 73/53, 99% ra    HR max 149         PT 34.4, INR 3.7, Cl 116, Glu 101, Ca 8.0              Cardiac diet, PT/INR daily, I/O, SCDs, Ambulate w/assist          Brief procedure note: AV carlos ablation performed by Dr. Mattie Daniel, full procedure note to follow up.        Cardiology [address] [facility], [place] by [facility](7/19/2020) by Porsha Jarrett RN         Review Status  Review Entered    Completed  7/20/2020 14:10        Criteria Review       Care Day: 1 Care Date: 7/19/2020 Level of Care: Step Down    Guideline Day 1    Level Of Care    (X) ICU or intermediate care [H]    7/20/2020 14:10:48 EDT by Porsha Ace    Clinical Status    (X) * Clinical Indications met [I]    Interventions    (X) Inpatient interventions as needed    7/20/2020 14:10:48 EDT by Shayy Liu Given in ED: 1L NS bolus, 500ml NS bolus      NS 125hr, Cardizem 20mg IV x1, Coumadin 2mg po x1, 1L NS bolus    * Milestone    Additional Notes    7/19/20         97.6-136-23, 97/67, 98% ra    BP low 73/52         Trop 0.05, BNP 3724, PT 33.4, INR 3.6, Cl 109, Glu 139, Cr 1.34, GFR 52, AST 49,D Dimer 0.70,TSH 4.10         UA negative         CXR: Enlarged cardiac silhouette, otherwise no acute disease         EKG:  Wide complex tachycardia              Orders: Cardiology Consult, Cardiac diet, PT/INR daily, Echo, Ambulate w/assist, SCD's, Pacer interrogation           Cardiology [address] [place] [address] - Clinical Indications for Admission to Inpatient Care by Porsha Jarrett RN         Review Status  Review Entered    Completed  7/20/2020 13:57        Criteria Review       Clinical Indications for Admission to Inpatient Care    Most Recent : Porsha Jarrett Most Recent Date: 7/20/2020 13:57:58 EDT    (X) Hospital admission is needed for appropriate care of the patient because of  1 or more  of    the following  (1) (2) (3) (4) (5) (6):       (X) Hemodynamic instability as indicated by  1 or more  of the following  (7) (8) (9) (10) (11)       (12) (13):          (X) Vital sign abnormality not readily corrected by appropriate treatment within 12 to 24 hours          as indicated by  1 or more  of the following :             (X) Tachycardia that persists despite appropriate treatment (eg, volume repletion, treatment of             pain, treatment of underlying cause)             7/20/2020 13:57:33 EDT by Chetan Arevalo        [id number]    Additional Notes    69 y.o. [place] male with PMH of sCHF, s/p AVR with mechanical heart valve on coumadin, hypothyroidism, PAF with pacer/biV ICD admitted for tachycardia. Per pt, states he noted that his heart was racing prompting him to go to [facility]. While there he was noted to have a \"wide complex tachycardia\". EMS was called and pt was transported to the ED. En route was given amiodarone. He denies CP but does note GONZALES. No N/V, diaphoresis. In the ED pt was noted to be hypotensive. With IVF's BP slightly improved            Physical Exam:         Gen:  Well-developed, well-nourished, in no acute distress    HEENT:  Pink conjunctivae, PERRL, hearing intact to voice, moist mucous membranes    Neck:  Supple, without masses, thyroid non-tender    Resp:  No accessory muscle use, clear breath sounds without wheezes rales or rhonchi    Card: Tachycardic. Regular. Harsh murmur    Abd:  Soft, non-tender, non-distended, normoactive bowel sounds are present, no palpable organomegaly    Lymph:  No cervical adenopathy    Musc:  No cyanosis or clubbing    Skin:  No rashes or ulcers, skin turgor is good    Neuro:  Cranial nerves 3-12 are grossly intact,  strength is 5/5 bilaterally, dorsi / plantarflexion strength is 5/5 bilaterally, follows commands appropriately    Psych:  Alert with good insight.  Oriented to person, place, and time              Assessment/Plan:     Hypotension - ?2/2 IVVD + medications spironolactone, Entresto in addition to tachycardia which may be causing poor forward perfusion    -continue IVF's    -no e/o infectious process; check UA for completion    -hold spironolactone and Entresto for now    -serial CE's    -check TTE    -cardiology eval         Tachycardia - does appear sinus on the monitor. EKG difficult to interpret. ?sinus tach ? SVT ? a-flutter    -pacer interrogation -cardiology consulted    -K, Mg, Ca WNL    -check TSH    -as above         H/o AVR    -on coumadin; goal INR 2.5-3.5         H/o sCHF    -monitor volume status with IVF's    -?why not on beta blocker    -no ACE I as on Entresto; holding for now         Elevated Cr - ?mild IVVD    -hold spironolactone, Entresto    -IVF's         PAF - pacer in place    -may need rate controlling agent pending pacer interrogation

## 2020-07-21 NOTE — PROGRESS NOTES
Transition Plan of Care  RUR 13%    Plan:  1. Continue to monitor patients response to treatment. 2. Afib RVR started on esmolol gtt  3. Patient is stable and not in exacerbation  4. AV carlos ablation this week. 5. Case Management to follow.   IVSHAL Sepulveda

## 2020-07-21 NOTE — PROGRESS NOTES
HISTORY OF PRESENTING ILLNESS      Edward Aguero is a 69 y.o. male with cardiomyopathy (nonischemic?), CRTD, mechanical AVR, TIA presenting with palpitations/fatigue over the past few days. He was found to be in atrial fibrillation with RVR. ICD interrogation demonstrated AF dating back to 2019. Recent atrial arrhythmia events were not available for review via ICD interrogation due to current atrial lead sensitivity settings. Recent CRT percentage was < 90%. Ventricular rates have been in the 130-150 range; cardizem drip was poorly tolerated hemodynamically; esmolol drip was initiated however also failed to improve rate control and was associated with hypotension as well (SBP 67 mm Hg). IV fluids were given and improved SBP to the 80-90 mm Hg range. He had successful AV node ablation on 7/20/2020 but unfortunately recovered AV carlos function overnight with HRs in the 120s. Esmolol drip titrated to 100mcg.         PAST MEDICAL HISTORY     Past Medical History:   Diagnosis Date    Anticoagulated on Coumadin 11/27/2017    Arthritis     Atrial fibrillation ([facility]     PAF    Cardiomyopathy ([facility]     GI bleed 02/2017    HTN (hypertension)     Hypothyroidism     Melanoma in situ ([facility]     Orthostatic hypotension 11/29/2017    Pacemaker 11/28/2017    Medtronic BiV ICD    S/P AVR (aortic valve replacement)     mechanical AVR    Syncope 9374    Systolic heart failure (HCC)     TIA (transient ischemic attack) 2017           PAST SURGICAL HISTORY     Past Surgical History:   Procedure Laterality Date    HX AORTIC VALVE REPLACEMENT  2003    mechanical aortic valve     HX KNEE ARTHROSCOPY Right 1980's    HX MOHS PROCEDURES Left 2005    Cheek and [address]    HX PACEMAKER  2011    with defibulator    HX TONSILLECTOMY  1952          ALLERGIES     No Known Allergies       FAMILY HISTORY     Family History   Problem Relation Age of Onset    Cancer Father     negative for cardiac disease       SOCIAL HISTORY Social History     Socioeconomic History    Marital status:      Spouse name: Not on file    Number of children: Not on file    Years of education: Not on file    Highest education level: Not on file   Tobacco Use    Smoking status: Former Smoker    Smokeless tobacco: Never Used   Substance and Sexual Activity    Alcohol use: Not Currently    Drug use: No    Sexual activity: Yes         MEDICATIONS     Current Facility-Administered Medications   Medication Dose Route Frequency    amiodarone (CORDARONE) tablet 400 mg  400 mg Oral BID    fentaNYL citrate (PF) injection 100 mcg  100 mcg IntraVENous ON CALL    midazolam (PF) (VERSED) injection 5 mg  5 mg IntraVENous ON CALL    sodium chloride (NS) flush 5-40 mL  5-40 mL IntraVENous PRN    esmolol 10mg/mL (BREVIBLOC) infusion  0-200 mcg/kg/min IntraVENous TITRATE    sodium chloride (NS) flush 5-40 mL  5-40 mL IntraVENous Q8H    acetaminophen (TYLENOL) tablet 650 mg  650 mg Oral Q4H PRN    naloxone (NARCAN) injection 0.4 mg  0.4 mg IntraVENous PRN    ondansetron (ZOFRAN) injection 4 mg  4 mg IntraVENous Q4H PRN    Warfarin pharmacy to dose   Other Rx Dosing/Monitoring       I have reviewed the nurses notes, vitals, problem list, allergy list, medical history, family, social history and medications. REVIEW OF SYMPTOMS      General: Pt denies excessive weight gain or loss. Pt is able to conduct ADL's  HEENT: Denies blurred vision, headaches, hearing loss, epistaxis and difficulty swallowing. Respiratory: +sob, Denies cough, congestion, wheezing or stridor.   Cardiovascular:  Denies precordial pain, palpitations, edema or PND  Gastrointestinal: Denies poor appetite, indigestion, abdominal pain or blood in stool  Genitourinary: Denies hematuria, dysuria, increased urinary frequency  Musculoskeletal: Denies joint pain or swelling from muscles or joints  Neurologic: Denies tremor, paresthesias, headache, or sensory motor disturbance  Psychiatric: Denies confusion, insomnia, depression  Integumentray: Denies rash, itching or ulcers. Hematologic: Denies easy bruising, bleeding       PHYSICAL EXAMINATION      Vitals: see vitals section  General: Well developed, in no acute distress. HEENT: No jaundice, oral mucosa moist, no oral ulcers  Neck: Supple, no stiffness, no lymphadenopathy, supple  Heart:  +tachycardia, irr Normal S1/S2 negative S3 or S4. No murmur, gallop or rub, no jugular venous distention  Respiratory: Clear bilaterally x 4, no wheezing or rales  Abdomen:   Soft, non-tender, bowel sounds are active. Extremities:  No edema, normal cap refill, no cyanosis. Musculoskeletal: No clubbing, no deformities  Neuro: A&Ox3, speech clear, gait stable, cooperative, no focal neurologic deficits  Skin: Skin color is normal. No rashes or lesions. Non diaphoretic, moist.  Vascular: 2+ pulses symmetric in all extremities       DIAGNOSTIC DATA      EKG:        LABORATORY DATA      Lab Results   Component Value Date/Time    WBC 8.0 07/21/2020 06:01 AM    HGB 13.6 07/21/2020 06:01 AM    HCT 43.0 07/21/2020 06:01 AM    PLATELET 326 48/67/4536 06:01 AM    MCV 94.5 07/21/2020 06:01 AM      Lab Results   Component Value Date/Time    Sodium 140 07/21/2020 06:01 AM    Potassium 5.1 07/21/2020 06:01 AM    Chloride 116 (H) 07/21/2020 06:01 AM    CO2 18 (L) 07/21/2020 06:01 AM    Anion gap 6 07/21/2020 06:01 AM    Glucose 102 (H) 07/21/2020 06:01 AM    BUN 17 07/21/2020 06:01 AM    Creatinine 1.26 07/21/2020 06:01 AM    BUN/Creatinine ratio 13 07/21/2020 06:01 AM    GFR est AA >60 07/21/2020 06:01 AM    GFR est non-AA 56 (L) 07/21/2020 06:01 AM    Calcium 8.1 (L) 07/21/2020 06:01 AM    Bilirubin, total 1.0 07/19/2020 05:05 PM    Alk.  phosphatase 86 07/19/2020 05:05 PM    Protein, total 7.0 07/19/2020 05:05 PM    Albumin 3.9 07/19/2020 05:05 PM    Globulin 3.1 07/19/2020 05:05 PM    A-G Ratio 1.3 07/19/2020 05:05 PM    ALT (SGPT) 55 07/19/2020 05:05 PM           ASSESSMENT      1. Atrial fibrillation              A. Long-standing persistent              B. RVR  2. Aortic valve replacement              A. Mechanical              B. Coumadin  3. Hypotension  4. ICD              A. Medtronic              B. Left sided              C. CRTD  5. Hypertension  6. Cardiomyopathy              A. LVEF 40-45%  7. GI bleed history  8. Hypertension  9. Orthostatic hypotension   10. TIA       PLAN     Start Amiodarone 400mg BID po. Will plan for ABELARDO/DCCV today with Dr. Rubina Licea. Discussed repeat AV carlos ablation this week with patient and he is amenable. Will discuss timing (most likely this week) with Dr. Ashleigh Vu further. Patient verbalized understanding of treatment plan. Patient seen and examined by me with nurse practitioner. I personally performed all components of the history, physical, and medical decision making and agree with the assessment and plan with minor modifications as noted. Demetris Armijo NP    Cardiac Electrophysiology / Cardiology    [place] [id number].  [address] [place] [address]  [phone] / [phone] Fax   [phone] / [phone] Fax

## 2020-07-21 NOTE — CDMP QUERY
Good Afternoon,  Pt admitted with Afib with RVR and pt noted to have persistent hypotension per documented vital signs. If possible, please document in the progress notes and d/c summary if you are evaluating and / or treating any of the following:     Cardiogenic Shock   Hypovolemic Shock   Other Shock, Please specify   Other, please specify   Clinically unable to determine    The medical record reflects the following:       Risk Factors: 69 Yr M admitted with Afib with RVR       Clinical Indicators: Patient arrived to the ED with c/o rapid heart rate and palpitations. Patient with PPM. Device interrogated and noted with normal function. Patient was attempted to be treated with cardizem gtt and esmolol gtt with persistent hypotension. Patient on admission with  BP 80/60. Patient has continued to have BP's of 77/61, 89/62, 74/51, 66/43 etc. Patient has been being treated with multiple NS IVF boluses. Patient had AV node ablation on 7/20 with converting back into AFIB with RVR overnight. Patient now NPO for ABELARDO/cardioversion and is back on esmolol gtt. Treatment: Cardiology consult, EP consult with AV node ablation, ABELARDO/cardioversion, frequent vital signs/monitoring, NS  ML/HR, 7/19 patient received 1,500 ML NS IVF bolus, on 7/20 patient received a 250 ML NS IVF bolus & 500 ML NS IVF bolus and on 7/21 patient received a 500 ML NS IVF bolus.     Thank you,  Evelina Weston [facility], [address] [place] [address]

## 2020-07-21 NOTE — CONSULTS
[facility]     Name: Andrés Motley MRN: [id number]   : 1945 [facility]   Date: 2020        Impression Plan   1. Afib with RVR  2. Hypotension  3. AVR on coumadin  4. Systolic CHF               · Cardiology following  · Failed ablation and cardioversion  · Planned for ischemic evaluation with cath when INR is appropriate  · To receive Vitamin K today  · Rate/rhythm control as per cardiology  · NPO at midnight       Radiology  ( personally reviewed) CXR with cardiomegaly   ABG No results for input(s): PHI, PO2I, PCO2I in the last 72 hours. Subjective     Patient is a 69 y.o. male with a history of heart failure, AVR with mechanical heart valve (on coumadin), hypothyroidsim, and PAF with pacer/BiV ICD admitted for afib with RVR. He had previously had an ablation in 2019 and underwent repeat ablation yesterday which was not successful. Had failed attempts at medical management due to hypotension. This morning cardioversion was completed, but unsuccessful. On device interrogation he was noted to have slow VT and the plan is now to undergo ischemic evaluation. He is tired, but denies chest pain, shortness of breath, f/c. Was not having palpitations at the time of my evaluation. Review of Systems:  Review of systems not obtained due to patient factors.     Past Medical History:   Diagnosis Date    Anticoagulated on Coumadin 2017    Arthritis     Atrial fibrillation (HCC)     PAF    Cardiomyopathy ([facility] [address]     GI bleed 2017    HTN (hypertension)     Hypothyroidism     Melanoma in situ ([facility] [address] [facility]     Orthostatic hypotension 2017    Pacemaker 2017    Medtronic BiV ICD    S/P AVR (aortic valve replacement)     mechanical AVR    Syncope 9921    Systolic heart failure (HCC)     TIA (transient ischemic attack)       Past Surgical History:   Procedure Laterality Date    HX AORTIC VALVE REPLACEMENT      mechanical aortic valve  HX KNEE ARTHROSCOPY Right 1980's    HX MOHS PROCEDURES Left 2005    Cheek and Navarro    HX PACEMAKER  2011    with defibulator    HX TONSILLECTOMY  1952      Prior to Admission medications    Medication Sig Start Date End Date Taking? Authorizing Provider   warfarin (COUMADIN) 2.5 mg tablet Take 2.5 mg by mouth six (6) days a week. The patient takes 2.5 mg in the evening on Monday, Wednesday, Thursday, Friday, Saturday, Sunday   Yes Provider, Historical   warfarin (COUMADIN) 2.5 mg tablet Take 3.75 mg by mouth every Tuesday. Yes Provider, Historical   sacubitril-valsartan (ENTRESTO) 49 mg/51 mg tablet Take 1 Tab by mouth two (2) times a day. Yes Maggie Stoddard MD   spironolactone (ALDACTONE) 25 mg tablet Take 12.5 mg by mouth two (2) times a day. Take 1/2 tablet two times daily   Yes Provider, Historical   levothyroxine (SYNTHROID) 25 mcg tablet Take 25 mcg by mouth Daily (before breakfast). Yes Provider, Historical   aspirin delayed-release 81 mg tablet Take 81 mg by mouth nightly. Yes Provider, Historical   pravastatin (PRAVACHOL) 40 mg tablet Take 1 Tab by mouth nightly. 12/13/17  Yes Kerline Frazier, MARY ELLEN   cholecalciferol, vitamin D3, (Vitamin D3) 50 mcg (2,000 unit) tab Take 2,000 Units by mouth daily. Yes Provider, Historical     Current Facility-Administered Medications   Medication Dose Route Frequency    amiodarone (CORDARONE) tablet 400 mg  400 mg Oral BID    esmolol 10mg/mL (BREVIBLOC) infusion  0-200 mcg/kg/min IntraVENous TITRATE    sodium chloride (NS) flush 5-40 mL  5-40 mL IntraVENous Q8H    Warfarin pharmacy to dose   Other Rx Dosing/Monitoring     No Known Allergies   Social History     Tobacco Use    Smoking status: Former Smoker    Smokeless tobacco: Never Used   Substance Use Topics    Alcohol use: Not Currently      Family History   Problem Relation Age of Onset    Cancer Father           Laboratory: I have personally reviewed the critical care flowsheet and labs. Recent Labs     07/21/20  0601 07/20/20  0411 07/19/20  1705   WBC 8.0 4.5 5.4   HGB 13.6 12.4 13.4   HCT 43.0 40.1 41.2    154 183     Recent Labs     07/21/20  0601 07/20/20  0411 07/19/20  1705    142 139   K 5.1 4.0 4.2   * 116* 109*   CO2 18* 21 24   * 101* 139*   BUN 17 14 19   CREA 1.26 0.97 1.34*   CA 8.1* 8.0* 8.8   MG 1.8 2.1 2.0   ALB  --   --  3.9   ALT  --   --  55   INR 5.6* 3.7* 3.6*       Objective:     Mode Rate Tidal Volume Pressure FiO2 PEEP                    Vital Signs:     TMAX(24)      Intake/Output:   Last shift:         Last 3 shifts: 07/21 0701 - 07/21 1900  In: 72 [I.V.:72]  Out: 100 [Urine:100]RRIOLAST3    Intake/Output Summary (Last 24 hours) at 7/21/2020 1132  Last data filed at 7/21/2020 1000  Gross per 24 hour   Intake 1463.1 ml   Output 250 ml   Net 1213.1 ml     EXAM:   GENERAL: well developed and in no distress, HEENT:  PERRL, EOMI, no alar flaring or epistaxis, oral mucosa moist without cyanosis, NECK:  no jugular vein distention, no retractions,LUNGS: CTAB HEART: paced rhythm during the time ABDOMEN:  soft with no tenderness, bowel sounds present, EXTREMITIES:  warm with no cyanosis, SKIN:  no jaundice or ecchymosis and NEUROLOGIC:  alert and oriented, grossly non-focal    Elton Rosenbaum MD  [facility]

## 2020-07-21 NOTE — PROGRESS NOTES
[facility] Dosing Services: 7/20/20    Consult for Warfarin Dosing by Pharmacy by Dr. Sonny Sims provided for this 69 y.o.  male , for indication of Mechanical AVR  Day of Therapy Continued from home. The patient takes 2.5 mg in the evening on Monday, Wednesday, Thursday, Friday, Saturday, Sunday. On Tuesday the patient takes 3.75 mg. Dose to achieve an INR goal of 2.5- 3.5    Hold warfarin today (INR jumped up from 3.7 yesterday to 5.6 today)  Hgb and Plts are stable. Significant drug interactions: None. Previous dose given 2 mg on 7/19   PT/INR Lab Results   Component Value Date/Time    INR 5.6 (HH) 07/21/2020 06:01 AM      Platelets Lab Results   Component Value Date/Time    PLATELET 355 20/46/4810 06:01 AM      H/H Lab Results   Component Value Date/Time    HGB 13.6 07/21/2020 06:01 AM        Pharmacy to follow daily and will provide subsequent Warfarin dosing based on clinical status.   [address] [facility], [facility])  Contact information [phone]

## 2020-07-21 NOTE — PROCEDURES
BRIEF PROCEDURE NOTE    Date of Procedure: 7/21/2020   Preoperative Diagnosis: atrial fibrillation  Postoperative Diagnosis: same   Procedure: Synchronized DC cardioversion  Cardiologist: Lydia Price MD  Anesthesia:  IV sedation  Estimated Blood Loss: None  Findings: Following ABELARDO demonstrating no LA/MAYDA thrombus, the patient was sedated with additional intravenous midazolam and Fentanyl. 200 joule biphasic shock was delivered twice using anterior and posterior pads with failure to convert atrial fibrillation to sinus rhythm. No complications were noted.      Complications: none    Unsuccessful attempt x 2 to cardiovert AF to sinus  Elective AVN ablation

## 2020-07-22 LAB
ANION GAP SERPL CALC-SCNC: 10 MMOL/L (ref 5–15)
BASOPHILS # BLD: 0 K/UL (ref 0–0.1)
BASOPHILS NFR BLD: 1 % (ref 0–1)
BUN SERPL-MCNC: 19 MG/DL (ref 6–20)
BUN/CREAT SERPL: 17 (ref 12–20)
CALCIUM SERPL-MCNC: 8.1 MG/DL (ref 8.5–10.1)
CHLORIDE SERPL-SCNC: 115 MMOL/L (ref 97–108)
CO2 SERPL-SCNC: 18 MMOL/L (ref 21–32)
CREAT SERPL-MCNC: 1.11 MG/DL (ref 0.7–1.3)
DIFFERENTIAL METHOD BLD: ABNORMAL
EOSINOPHIL # BLD: 0.1 K/UL (ref 0–0.4)
EOSINOPHIL NFR BLD: 1 % (ref 0–7)
ERYTHROCYTE [DISTWIDTH] IN BLOOD BY AUTOMATED COUNT: 15.2 % (ref 11.5–14.5)
GLUCOSE SERPL-MCNC: 95 MG/DL (ref 65–100)
HCT VFR BLD AUTO: 39.7 % (ref 36.6–50.3)
HGB BLD-MCNC: 12.6 G/DL (ref 12.1–17)
IMM GRANULOCYTES # BLD AUTO: 0 K/UL (ref 0–0.04)
IMM GRANULOCYTES NFR BLD AUTO: 1 % (ref 0–0.5)
INR PPP: 2.6 (ref 0.9–1.1)
LYMPHOCYTES # BLD: 1.4 K/UL (ref 0.8–3.5)
LYMPHOCYTES NFR BLD: 21 % (ref 12–49)
MCH RBC QN AUTO: 29.6 PG (ref 26–34)
MCHC RBC AUTO-ENTMCNC: 31.7 G/DL (ref 30–36.5)
MCV RBC AUTO: 93.4 FL (ref 80–99)
MONOCYTES # BLD: 0.7 K/UL (ref 0–1)
MONOCYTES NFR BLD: 10 % (ref 5–13)
NEUTS SEG # BLD: 4.4 K/UL (ref 1.8–8)
NEUTS SEG NFR BLD: 66 % (ref 32–75)
NRBC # BLD: 0 K/UL (ref 0–0.01)
NRBC BLD-RTO: 0 PER 100 WBC
PLATELET # BLD AUTO: 169 K/UL (ref 150–400)
PMV BLD AUTO: 11 FL (ref 8.9–12.9)
POTASSIUM SERPL-SCNC: 4.2 MMOL/L (ref 3.5–5.1)
PROTHROMBIN TIME: 25 SEC (ref 9–11.1)
RBC # BLD AUTO: 4.25 M/UL (ref 4.1–5.7)
SODIUM SERPL-SCNC: 143 MMOL/L (ref 136–145)
WBC # BLD AUTO: 6.6 K/UL (ref 4.1–11.1)

## 2020-07-22 PROCEDURE — 80048 BASIC METABOLIC PNL TOTAL CA: CPT

## 2020-07-22 PROCEDURE — 74011250636 HC RX REV CODE- 250/636: Performed by: NURSE PRACTITIONER

## 2020-07-22 PROCEDURE — 74011250637 HC RX REV CODE- 250/637: Performed by: NURSE PRACTITIONER

## 2020-07-22 PROCEDURE — 74011000258 HC RX REV CODE- 258: Performed by: NURSE PRACTITIONER

## 2020-07-22 PROCEDURE — 85610 PROTHROMBIN TIME: CPT

## 2020-07-22 PROCEDURE — 65610000006 HC RM INTENSIVE CARE

## 2020-07-22 PROCEDURE — 36415 COLL VENOUS BLD VENIPUNCTURE: CPT

## 2020-07-22 PROCEDURE — 85025 COMPLETE CBC W/AUTO DIFF WBC: CPT

## 2020-07-22 RX ADMIN — Medication 10 ML: at 22:10

## 2020-07-22 RX ADMIN — AMIODARONE HYDROCHLORIDE 0.5 MG/MIN: 50 INJECTION, SOLUTION INTRAVENOUS at 08:09

## 2020-07-22 RX ADMIN — Medication 10 ML: at 05:03

## 2020-07-22 RX ADMIN — AMIODARONE HYDROCHLORIDE 0.5 MG/MIN: 50 INJECTION, SOLUTION INTRAVENOUS at 20:02

## 2020-07-22 RX ADMIN — AMIODARONE HYDROCHLORIDE 400 MG: 200 TABLET ORAL at 08:03

## 2020-07-22 NOTE — PROGRESS NOTES
7/22/2020  2:06 PM  EMR reviewed, pt is continuing to require medical management for AFib for RVR, hypotension, AVR on coumadin, systolic CHF. Transitions of Care Plan:     RUR 14%  1. CM to follow through for treatment/response  2. Cardiology following, failed cardioversion and ablation, plan for cardiac cath tomorrow 7/23  3. CM to follow for d/c needs  4. Pt would benefit from PT/OT evals when stable to determine skilled needs at d/c  5. D/ C when stable to home w/ family assistance  6. Outpatient f/u cardiology, PCP  7.  Family will transport  VISHAL Wolff

## 2020-07-22 NOTE — PROGRESS NOTES
Comprehensive Nutrition Assessment    Type and Reason for Visit: Initial    Nutrition Recommendations/Plan:   1. Continue Cardiac diet. 2. Recommend Ensure Enlive x1/d (350 kcal, 20 gm protein) to promote adequate intakes. 3. Monitor PO intake, GI function, labs, and wt trends. Nutrition Assessment:      7/22: 70 y/o M admitted with hypotension, tachycardia. Pt seen for ICU LOS. PMH - a fib, cardiomyopathy, GIB, HTN, pacemaker, s/p AVR, systolic heart failure, TIA. Cath planned for tomorrow. Spoke with pt and family at bedside. Pt reports fair appetite, not feeling hungry, was able to eat juice and juice for breakfast. Meal intake per EMR shows % intakes. Pt with decreased appetite PTA. Pt consumes x2 meals/d + snacks, has a Premier Protein shake during the day.  lb. BMI 27.8 c/w overweight. Pt reports recent unintended weight loss 2/2 decreased appetite.  lb and reported  lb. No weight loss indicated, continue to monitor weight trends. NKFA. No c/s difficulties. Pt denies n/v. LBM 7/18. Skin without PI. Labs - Ca 8.1 L. Medications reviewed.      PO Intakes:  Patient Vitals for the past 168 hrs:   % Diet Eaten   07/21/20 1400 55 %   07/20/20 1458 100 %         Weight Hx:   Wt Readings from Last 10 Encounters:   07/22/20 93.1 kg (205 lb 4 oz)   02/10/20 90.8 kg (200 lb 2.8 oz)   01/30/20 86.6 kg (191 lb)   01/02/20 90.4 kg (199 lb 6 oz)   12/12/19 119.3 kg (263 lb)   12/13/17 88.5 kg (195 lb)   12/04/17 91.2 kg (201 lb)   12/01/17 88.2 kg (194 lb 7.1 oz)   ]    Malnutrition Assessment:  Malnutrition Status:  None    Estimated Daily Nutrient Needs:  Energy (kcal):  2235(REE 1719 x 1.3)  Protein (g):  93 - 112(1.0 - 1.2 gm/kg)       Fluid (ml/day):  2235(1 ml/kcal)    Nutrition Related Findings:       LBM - 7/18    Wounds:    None       Current Nutrition Therapies:   DIET CARDIAC Regular; 2 GM NA (House Low NA)  DIET NPO    Anthropometric Measures:  · Height:  6' (182.9 cm)  · Current Body Wt:  93.1 kg (205 lb 4 oz)   · Adjusted Body Weight:   ; Weight Adjustment for:     · Adjusted BMI:       · BMI Categories:  Overweight (BMI 25.0-29. 9)       Nutrition Diagnosis:   · Inadequate energy intake related to (decreased ability to consume sufficient energy) as evidenced by (pt reports decreased appetite/ intake with ~25% intake for breakfast this AM)      Nutrition Interventions:   Food and/or Nutrient Delivery: Continue current diet, Start oral nutrition supplement  Nutrition Education and Counseling: No recommendations at this time  Coordination of Nutrition Care: Continued inpatient monitoring    Goals:  PO intakes >50% + ONS within 5-7 days       Nutrition Monitoring and Evaluation:   Behavioral-Environmental Outcomes:  None  Food/Nutrient Intake Outcomes: Supplement intake, Food and nutrient intake  Physical Signs/Symptoms Outcomes: GI status, Weight    Discharge Planning:    Continue current diet     Electronically signed by Сергей Montez on 7/22/2020 at 11:42 AM    Contact: [phone] (office)

## 2020-07-22 NOTE — PROGRESS NOTES
Pharmacy Dosing Services:     INR 2.6 (s/p Vitamin K 5 mg on 7/21/20)  Pending Cath  No Warfarin today      Thank you,  Cheri Avalos, PharmD

## 2020-07-22 NOTE — PROGRESS NOTES
Bedside and Verbal shift change report given to Coral Olivares (oncoming nurse) by Kalyani Malik (offgoing nurse). Report included the following information SBAR, Procedure Summary, Intake/Output, Recent Results, Cardiac Rhythm Slow VTach and Alarm Parameters . Primary Nurse Griselda Mu, RN and Kalyani Malik, RN performed a dual skin assessment on this patient No impairment noted  Patric score is 20    Neuro A+Ox4  Cardiac Rhythm in slow Vtach, occasionally paced at 100, Loud mechanical heart sounds, pulses palpable, swelling in hands, BP WNL  Resp- room air, lung sounds clear  GI/- Voiding, active BS, good appetite  Skin- redness on bottom, no impairment    Amio 0.5mcg  Cardiology eval pt this AM, cath today or tomorrow, INR 2.7. Medtronic at bedside, pacer setting changed. 0930  Plan for left heart cath tomorrow AM. NPO at midnight. 1100  Pt up to chair. 1200  Reassessment complete. Pt eating lunch in chair. 1400  On monitor, pacer is spiking but there is no QRS, no capture. Stanton with cardiology made aware. 1600  reassesment complete, pacer now capturing. Pt back to bed. Bedside and Verbal shift change report given to Yolanda Magallanes (oncoming nurse) by Coral Olivares (offgoing nurse). Report included the following information SBAR, Procedure Summary, Intake/Output, MAR, Recent Results, Cardiac Rhythm vtach, a flutter and Alarm Parameters .

## 2020-07-22 NOTE — PROGRESS NOTES
Checked in on patient for primary RN while off unit, no changes noted/no c/o at this time, patient just waiting for dinner. Pt not due for any medications at this time.

## 2020-07-22 NOTE — PROGRESS NOTES
[address] [facility]  Hospitalist Group                                                                                          Hospitalist Progress Note  Julee Herron MD        Date of Service:  2020  NAME:  Rosa Reyes  :  1945  MRN:  [id number]      Admission Summary:   62-year-old male presented to the emergency room with complaints of racing heart. He was noted to have atrial fibrillation and hypotension  Interval history / Subjective:   No complaints this am, HR controlled this am on amio gtt.        Assessment & Plan:     Atrial fibrillation with RVR  -Status post failed AV carlos ablation ,   -reverted back to afib, no on amiodarone gtt  -Cardiology following, plan for cath when INR is subtherapeutic    Hypotension  -This is probably due to A. fib with RVR and poor perfusion/esmolol gtt    History of aortic valve replacement  -Currently on Coumadin, INR 2.7 (goal 2.5-3.5)  -received vit K yday  -hold coumadin today    History of systolic CHF  -Stable, not in exacerbation  -Continue to monitor volume status  -Entresto and spironolactone was on hold due to hypotension    Elevated creatinine  -Likely from hypoperfusion  -Improved today    Code status: Cardiac  DVT prophylaxis: Coumadin       Hospital Problems  Date Reviewed: 2020          Codes Class Noted POA    Tachycardia ICD-10-CM: R00.0  ICD-9-CM: 785.0  2020 Unknown        Tachy-donal syndrome ([facility] [address] ICD-10-CM: I49.5  ICD-9-CM: 427.81  2020 Unknown        * (Principal) Hypotension ICD-10-CM: I95.9  ICD-9-CM: 458.9  2020 Yes        H/O aortic valve replacement ICD-10-CM: Z95.2  ICD-9-CM: V43.3  2017 Yes    Overview Signed 2017  3:19 PM by Yadiel Blanchard MD      @ [facility]              CVA (cerebral vascular accident) [facility]) ICD-10-CM: I63.9  ICD-9-CM: 434.91  2017 Yes        Pacemaker (Chronic) ICD-10-CM: Z95.0  ICD-9-CM: V45.01  2017 Yes    Overview Signed 11/28/2017 10:55 AM by Mariia Phan     -- unsure if MRI compatible             TIA (transient ischemic attack) ICD-10-CM: G45.9  ICD-9-CM: 435.9  11/27/2017 Yes        CHF (congestive heart failure) (HCC) (Chronic) ICD-10-CM: I50.9  ICD-9-CM: 428.0  11/27/2017 Yes        HTN (hypertension) (Chronic) ICD-10-CM: I10  ICD-9-CM: 401.9  11/27/2017 Yes        Elevated serum creatinine ICD-10-CM: R79.89  ICD-9-CM: 790.99  11/27/2017 Yes        Chronic anticoagulation (Chronic) ICD-10-CM: Z79.01  ICD-9-CM: V58.61  11/27/2017 Yes                Review of Systems:   A comprehensive review of systems was negative except for that written in the HPI. Vital Signs:    Last 24hrs VS reviewed since prior progress note. Most recent are:  Visit Vitals  /80   Pulse (!) 123   Temp 97.3 °F (36.3 °C)   Resp 19   Ht 6' (1.829 m)   Wt 93.1 kg (205 lb 4 oz)   SpO2 93%   BMI 27.84 kg/m²         Intake/Output Summary (Last 24 hours) at 7/22/2020 5331  Last data filed at 7/22/2020 0700  Gross per 24 hour   Intake 887.88 ml   Output 215 ml   Net 672.88 ml        Physical Examination:             Constitutional:  No acute distress, cooperative, pleasant    ENT:  Oral mucosa moist, oropharynx benign. Resp:  CTA bilaterally. No wheezing/rhonchi/rales. No accessory muscle use   CV:  Regular rhythm, normal rate, no murmurs, gallops, rubs    GI:  Soft, non distended, non tender. normoactive bowel sounds, no hepatosplenomegaly     Musculoskeletal:  No edema, warm, 2+ pulses throughout    Neurologic:  Moves all extremities. AAOx3, CN II-XII reviewed     Psych:  Good insight, Not anxious nor agitated.        Data Review:    Review and/or order of clinical lab test  Decision to obtain old records and/or obtain history from someone other than the patient      Labs:     Recent Labs     07/22/20  0426 07/21/20  0601   WBC 6.6 8.0   HGB 12.6 13.6   HCT 39.7 43.0    200     Recent Labs     07/22/20  0426 07/21/20  0601 07/20/20  0411 07/19/20  1705    140 142 139   K 4.2 5.1 4.0 4.2   * 116* 116* 109*   CO2 18* 18* 21 24   BUN 19 17 14 19   CREA 1.11 1.26 0.97 1.34*   GLU 95 102* 101* 139*   CA 8.1* 8.1* 8.0* 8.8   MG  --  1.8 2.1 2.0     Recent Labs     07/19/20  1705   ALT 55   AP 86   TBILI 1.0   TP 7.0   ALB 3.9   GLOB 3.1     Recent Labs     07/22/20  0426 07/21/20  0601 07/20/20  0411   INR 2.6* 5.6* 3.7*   PTP 25.0* 50.1* 34.4*      No results for input(s): FE, TIBC, PSAT, FERR in the last 72 hours. No results found for: FOL, RBCF   No results for input(s): PH, PCO2, PO2 in the last 72 hours.   Recent Labs     07/19/20  1705   TROIQ 0.05*     Lab Results   Component Value Date/Time    Cholesterol, total 135 11/28/2017 05:04 AM    HDL Cholesterol 24 11/28/2017 05:04 AM    LDL, calculated 48.8 11/28/2017 05:04 AM    Triglyceride 311 (H) 11/28/2017 05:04 AM    CHOL/HDL Ratio 5.6 (H) 11/28/2017 05:04 AM     Lab Results   Component Value Date/Time    Glucose (POC) 98 11/27/2017 08:23 PM    Glucose (POC) 94 11/27/2017 08:08 PM     Lab Results   Component Value Date/Time    Color YELLOW/STRAW 07/19/2020 07:05 PM    Appearance CLEAR 07/19/2020 07:05 PM    Specific gravity 1.012 07/19/2020 07:05 PM    pH (UA) 6.0 07/19/2020 07:05 PM    Protein Negative 07/19/2020 07:05 PM    Glucose Negative 07/19/2020 07:05 PM    Ketone Negative 07/19/2020 07:05 PM    Bilirubin Negative 07/19/2020 07:05 PM    Urobilinogen 0.2 07/19/2020 07:05 PM    Nitrites Negative 07/19/2020 07:05 PM    Leukocyte Esterase Negative 07/19/2020 07:05 PM    Epithelial cells FEW 07/19/2020 07:05 PM    Bacteria Negative 07/19/2020 07:05 PM    WBC 0-4 07/19/2020 07:05 PM    RBC 0-5 07/19/2020 07:05 PM         Medications Reviewed:     Current Facility-Administered Medications   Medication Dose Route Frequency    amiodarone (CORDARONE) tablet 400 mg  400 mg Oral BID    amiodarone (CORDARONE) 375 mg in dextrose 5% 250 mL infusion  0.5-1 mg/min IntraVENous TITRATE    sodium chloride (NS) flush 5-40 mL  5-40 mL IntraVENous PRN    esmolol 10mg/mL (BREVIBLOC) infusion  0-200 mcg/kg/min IntraVENous TITRATE    sodium chloride (NS) flush 5-40 mL  5-40 mL IntraVENous Q8H    acetaminophen (TYLENOL) tablet 650 mg  650 mg Oral Q4H PRN    naloxone (NARCAN) injection 0.4 mg  0.4 mg IntraVENous PRN    ondansetron (ZOFRAN) injection 4 mg  4 mg IntraVENous Q4H PRN    Warfarin pharmacy to dose   Other Rx Dosing/Monitoring     ______________________________________________________________________  EXPECTED LENGTH OF STAY: 2d 9h  ACTUAL LENGTH OF STAY:          Sharif Hi MD

## 2020-07-22 NOTE — PROGRESS NOTES
[facility]     Name: Josesito Monzon MRN: [id number]   : 1945 Hospital: [facility]   Date: 2020        Impression Plan   1. Afib with RVR  2. Hypotension  3. AVR on coumadin  4. Systolic CHF               · Cardiology following  · Failed ablation and cardioversion  · Planned for ischemic evaluation with cath when INR is appropriate, cath tomorrow morning  · S/P vitamin K  · Rate/rhythm control as per cardiology  · NPO at midnight       Radiology  ( personally reviewed) CXR with cardiomegaly   ABG No results for input(s): PHI, PO2I, PCO2I in the last 72 hours. Subjective     Patient is a 69 y.o. male with a history of heart failure, AVR with mechanical heart valve (on coumadin), hypothyroidsim, and PAF with pacer/BiV ICD admitted for afib with RVR. He had previously had an ablation in  and underwent repeat ablation yesterday which was not successful. Had failed attempts at medical management due to hypotension. This morning cardioversion was completed, but unsuccessful. On device interrogation he was noted to have slow VT and the plan is now to undergo ischemic evaluation. He is tired, but denies chest pain, shortness of breath, f/c. Was not having palpitations at the time of my evaluation. Interval history:  No acute events overnight  Planned for cath tomorrow morning    Review of Systems:  Review of systems not obtained due to patient factors.     Past Medical History:   Diagnosis Date    Anticoagulated on Coumadin 2017    Arthritis     Atrial fibrillation (HCC)     PAF    Cardiomyopathy ([facility] [address]     GI bleed 2017    HTN (hypertension)     Hypothyroidism     Melanoma in situ ([facility] [address]     Orthostatic hypotension 2017    Pacemaker 2017    Medtronic BiV ICD    S/P AVR (aortic valve replacement)     mechanical AVR    Syncope 6988    Systolic heart failure (HCC)     TIA (transient ischemic attack)       Past Surgical History: Procedure Laterality Date    HX AORTIC VALVE REPLACEMENT  2003    mechanical aortic valve     HX KNEE ARTHROSCOPY Right 1980's    HX MOHS PROCEDURES Left 2005    Cheek and [place]    HX PACEMAKER  2011    with defibulator    HX TONSILLECTOMY  1952    MI ICAR CATHETER ABLATION ATRIOVENTR NODE FUNCTION N/A 7/20/2020    ABLATION AV NODE performed by Conner Duke MD at [facility] CATH LAB    MI INTRACARDIAC ELECTROPHYSIOLOGIC 3D MAPPING N/A 7/20/2020    Ep 3d Mapping performed by Conner Duke MD at [facility] CATH LAB      Prior to Admission medications    Medication Sig Start Date End Date Taking? Authorizing Provider   warfarin (COUMADIN) 2.5 mg tablet Take 2.5 mg by mouth six (6) days a week. The patient takes 2.5 mg in the evening on Monday, Wednesday, Thursday, Friday, Saturday, Sunday   Yes Provider, Historical   warfarin (COUMADIN) 2.5 mg tablet Take 3.75 mg by mouth every Tuesday. Yes Provider, Historical   sacubitril-valsartan (ENTRESTO) 49 mg/51 mg tablet Take 1 Tab by mouth two (2) times a day. Yes Kourtney Pinto MD   spironolactone (ALDACTONE) 25 mg tablet Take 12.5 mg by mouth two (2) times a day. Take 1/2 tablet two times daily   Yes Provider, Historical   levothyroxine (SYNTHROID) 25 mcg tablet Take 25 mcg by mouth Daily (before breakfast). Yes Provider, Historical   aspirin delayed-release 81 mg tablet Take 81 mg by mouth nightly. Yes Provider, Historical   pravastatin (PRAVACHOL) 40 mg tablet Take 1 Tab by mouth nightly. 12/13/17  Yes Joi Frazier NP   cholecalciferol, vitamin D3, (Vitamin D3) 50 mcg (2,000 unit) tab Take 2,000 Units by mouth daily.    Yes Provider, Historical     Current Facility-Administered Medications   Medication Dose Route Frequency    amiodarone (CORDARONE) 375 mg in dextrose 5% 250 mL infusion  0.5-1 mg/min IntraVENous TITRATE    esmolol 10mg/mL (BREVIBLOC) infusion  0-200 mcg/kg/min IntraVENous TITRATE    sodium chloride (NS) flush 5-40 mL  5-40 mL IntraVENous Q8H    Warfarin pharmacy to dose   Other Rx Dosing/Monitoring     No Known Allergies   Social History     Tobacco Use    Smoking status: Former Smoker    Smokeless tobacco: Never Used   Substance Use Topics    Alcohol use: Not Currently      Family History   Problem Relation Age of Onset    Cancer Father           Laboratory: I have personally reviewed the critical care flowsheet and labs.      Recent Labs     07/22/20  0426 07/21/20  0601 07/20/20  0411   WBC 6.6 8.0 4.5   HGB 12.6 13.6 12.4   HCT 39.7 43.0 40.1    200 154     Recent Labs     07/22/20  0426 07/21/20  0601 07/20/20  0411 07/19/20  1705    140 142 139   K 4.2 5.1 4.0 4.2   * 116* 116* 109*   CO2 18* 18* 21 24   GLU 95 102* 101* 139*   BUN 19 17 14 19   CREA 1.11 1.26 0.97 1.34*   CA 8.1* 8.1* 8.0* 8.8   MG  --  1.8 2.1 2.0   ALB  --   --   --  3.9   ALT  --   --   --  55   INR 2.6* 5.6* 3.7* 3.6*       Objective:     Mode Rate Tidal Volume Pressure FiO2 PEEP                    Vital Signs:     TMAX(24)      Intake/Output:   Last shift:         Last 3 shifts: 07/22 0701 - 07/22 1900  In: 60 [I.V.:60]  Out: 75 [Urine:75]RRIOLAST3    Intake/Output Summary (Last 24 hours) at 7/22/2020 1125  Last data filed at 7/22/2020 1000  Gross per 24 hour   Intake 894.34 ml   Output 190 ml   Net 704.34 ml     EXAM:   GENERAL: well developed and in no distress, HEENT:  PERRL, EOMI, no alar flaring or epistaxis, oral mucosa moist without cyanosis, NECK:  no jugular vein distention, no retractions,LUNGS: CTAB HEART: paced rhythm during the time ABDOMEN:  soft with no tenderness, bowel sounds present, EXTREMITIES:  warm with no cyanosis, SKIN:  no jaundice or ecchymosis and NEUROLOGIC:  alert and oriented, grossly non-focal    Jose Jean MD  [facility] [place]

## 2020-07-23 LAB
ANION GAP SERPL CALC-SCNC: 8 MMOL/L (ref 5–15)
BASOPHILS # BLD: 0.1 K/UL (ref 0–0.1)
BASOPHILS NFR BLD: 1 % (ref 0–1)
BUN SERPL-MCNC: 19 MG/DL (ref 6–20)
BUN/CREAT SERPL: 17 (ref 12–20)
CALCIUM SERPL-MCNC: 8.7 MG/DL (ref 8.5–10.1)
CHLORIDE SERPL-SCNC: 112 MMOL/L (ref 97–108)
CO2 SERPL-SCNC: 20 MMOL/L (ref 21–32)
CREAT SERPL-MCNC: 1.14 MG/DL (ref 0.7–1.3)
DIFFERENTIAL METHOD BLD: ABNORMAL
EOSINOPHIL # BLD: 0.1 K/UL (ref 0–0.4)
EOSINOPHIL NFR BLD: 1 % (ref 0–7)
ERYTHROCYTE [DISTWIDTH] IN BLOOD BY AUTOMATED COUNT: 15 % (ref 11.5–14.5)
GLUCOSE SERPL-MCNC: 97 MG/DL (ref 65–100)
HCT VFR BLD AUTO: 38.6 % (ref 36.6–50.3)
HGB BLD-MCNC: 12.3 G/DL (ref 12.1–17)
IMM GRANULOCYTES # BLD AUTO: 0 K/UL (ref 0–0.04)
IMM GRANULOCYTES NFR BLD AUTO: 0 % (ref 0–0.5)
INR PPP: 1.6 (ref 0.9–1.1)
LYMPHOCYTES # BLD: 1.4 K/UL (ref 0.8–3.5)
LYMPHOCYTES NFR BLD: 23 % (ref 12–49)
MCH RBC QN AUTO: 29.8 PG (ref 26–34)
MCHC RBC AUTO-ENTMCNC: 31.9 G/DL (ref 30–36.5)
MCV RBC AUTO: 93.5 FL (ref 80–99)
MONOCYTES # BLD: 0.7 K/UL (ref 0–1)
MONOCYTES NFR BLD: 10 % (ref 5–13)
NEUTS SEG # BLD: 4.1 K/UL (ref 1.8–8)
NEUTS SEG NFR BLD: 65 % (ref 32–75)
NRBC # BLD: 0 K/UL (ref 0–0.01)
NRBC BLD-RTO: 0 PER 100 WBC
PLATELET # BLD AUTO: 182 K/UL (ref 150–400)
PMV BLD AUTO: 11.1 FL (ref 8.9–12.9)
POTASSIUM SERPL-SCNC: 4.2 MMOL/L (ref 3.5–5.1)
PROTHROMBIN TIME: 15.6 SEC (ref 9–11.1)
RBC # BLD AUTO: 4.13 M/UL (ref 4.1–5.7)
SODIUM SERPL-SCNC: 140 MMOL/L (ref 136–145)
WBC # BLD AUTO: 6.4 K/UL (ref 4.1–11.1)

## 2020-07-23 PROCEDURE — 77030004532 HC CATH ANGI DX IMP BSC -A: Performed by: INTERNAL MEDICINE

## 2020-07-23 PROCEDURE — 74011250636 HC RX REV CODE- 250/636: Performed by: INTERNAL MEDICINE

## 2020-07-23 PROCEDURE — C1769 GUIDE WIRE: HCPCS | Performed by: INTERNAL MEDICINE

## 2020-07-23 PROCEDURE — 4A023N7 MEASUREMENT OF CARDIAC SAMPLING AND PRESSURE, LEFT HEART, PERCUTANEOUS APPROACH: ICD-10-PCS | Performed by: INTERNAL MEDICINE

## 2020-07-23 PROCEDURE — 74011000250 HC RX REV CODE- 250: Performed by: INTERNAL MEDICINE

## 2020-07-23 PROCEDURE — 74011636320 HC RX REV CODE- 636/320: Performed by: INTERNAL MEDICINE

## 2020-07-23 PROCEDURE — 94760 N-INVAS EAR/PLS OXIMETRY 1: CPT

## 2020-07-23 PROCEDURE — 77030008543 HC TBNG MON PRSS MRTM -A: Performed by: INTERNAL MEDICINE

## 2020-07-23 PROCEDURE — 85025 COMPLETE CBC W/AUTO DIFF WBC: CPT

## 2020-07-23 PROCEDURE — 74011250637 HC RX REV CODE- 250/637: Performed by: NURSE PRACTITIONER

## 2020-07-23 PROCEDURE — 93454 CORONARY ARTERY ANGIO S&I: CPT | Performed by: INTERNAL MEDICINE

## 2020-07-23 PROCEDURE — 36415 COLL VENOUS BLD VENIPUNCTURE: CPT

## 2020-07-23 PROCEDURE — 99153 MOD SED SAME PHYS/QHP EA: CPT | Performed by: INTERNAL MEDICINE

## 2020-07-23 PROCEDURE — B2111ZZ FLUOROSCOPY OF MULTIPLE CORONARY ARTERIES USING LOW OSMOLAR CONTRAST: ICD-10-PCS | Performed by: INTERNAL MEDICINE

## 2020-07-23 PROCEDURE — B2151ZZ FLUOROSCOPY OF LEFT HEART USING LOW OSMOLAR CONTRAST: ICD-10-PCS | Performed by: INTERNAL MEDICINE

## 2020-07-23 PROCEDURE — 77010033678 HC OXYGEN DAILY

## 2020-07-23 PROCEDURE — 77030004522 HC CATH ANGI DX EXPO BSC -A: Performed by: INTERNAL MEDICINE

## 2020-07-23 PROCEDURE — 65660000000 HC RM CCU STEPDOWN

## 2020-07-23 PROCEDURE — 80048 BASIC METABOLIC PNL TOTAL CA: CPT

## 2020-07-23 PROCEDURE — 74011000258 HC RX REV CODE- 258: Performed by: NURSE PRACTITIONER

## 2020-07-23 PROCEDURE — C1894 INTRO/SHEATH, NON-LASER: HCPCS | Performed by: INTERNAL MEDICINE

## 2020-07-23 PROCEDURE — 74011250636 HC RX REV CODE- 250/636: Performed by: NURSE PRACTITIONER

## 2020-07-23 PROCEDURE — 99152 MOD SED SAME PHYS/QHP 5/>YRS: CPT | Performed by: INTERNAL MEDICINE

## 2020-07-23 PROCEDURE — 85610 PROTHROMBIN TIME: CPT

## 2020-07-23 RX ORDER — MIDAZOLAM HYDROCHLORIDE 1 MG/ML
INJECTION, SOLUTION INTRAMUSCULAR; INTRAVENOUS AS NEEDED
Status: DISCONTINUED | OUTPATIENT
Start: 2020-07-23 | End: 2020-07-23 | Stop reason: HOSPADM

## 2020-07-23 RX ORDER — SODIUM CHLORIDE 0.9 % (FLUSH) 0.9 %
5-40 SYRINGE (ML) INJECTION EVERY 8 HOURS
Status: DISCONTINUED | OUTPATIENT
Start: 2020-07-23 | End: 2020-07-24 | Stop reason: HOSPADM

## 2020-07-23 RX ORDER — POLYETHYLENE GLYCOL 3350 17 G/17G
17 POWDER, FOR SOLUTION ORAL ONCE
Status: COMPLETED | OUTPATIENT
Start: 2020-07-23 | End: 2020-07-23

## 2020-07-23 RX ORDER — VERAPAMIL HYDROCHLORIDE 2.5 MG/ML
INJECTION, SOLUTION INTRAVENOUS AS NEEDED
Status: DISCONTINUED | OUTPATIENT
Start: 2020-07-23 | End: 2020-07-23 | Stop reason: HOSPADM

## 2020-07-23 RX ORDER — FENTANYL CITRATE 50 UG/ML
INJECTION, SOLUTION INTRAMUSCULAR; INTRAVENOUS AS NEEDED
Status: DISCONTINUED | OUTPATIENT
Start: 2020-07-23 | End: 2020-07-23 | Stop reason: HOSPADM

## 2020-07-23 RX ORDER — WARFARIN 1 MG/1
1.5 TABLET ORAL ONCE
Status: COMPLETED | OUTPATIENT
Start: 2020-07-23 | End: 2020-07-23

## 2020-07-23 RX ORDER — SODIUM CHLORIDE 0.9 % (FLUSH) 0.9 %
5-40 SYRINGE (ML) INJECTION AS NEEDED
Status: DISCONTINUED | OUTPATIENT
Start: 2020-07-23 | End: 2020-07-24 | Stop reason: HOSPADM

## 2020-07-23 RX ORDER — AMIODARONE HYDROCHLORIDE 200 MG/1
400 TABLET ORAL 2 TIMES DAILY
Status: DISCONTINUED | OUTPATIENT
Start: 2020-07-23 | End: 2020-07-24 | Stop reason: HOSPADM

## 2020-07-23 RX ORDER — HEPARIN SODIUM 1000 [USP'U]/ML
INJECTION, SOLUTION INTRAVENOUS; SUBCUTANEOUS AS NEEDED
Status: DISCONTINUED | OUTPATIENT
Start: 2020-07-23 | End: 2020-07-23 | Stop reason: HOSPADM

## 2020-07-23 RX ORDER — ENOXAPARIN SODIUM 100 MG/ML
1 INJECTION SUBCUTANEOUS EVERY 12 HOURS
Status: DISCONTINUED | OUTPATIENT
Start: 2020-07-23 | End: 2020-07-24 | Stop reason: HOSPADM

## 2020-07-23 RX ORDER — LIDOCAINE HYDROCHLORIDE 10 MG/ML
INJECTION INFILTRATION; PERINEURAL AS NEEDED
Status: DISCONTINUED | OUTPATIENT
Start: 2020-07-23 | End: 2020-07-23 | Stop reason: HOSPADM

## 2020-07-23 RX ORDER — HEPARIN SODIUM 200 [USP'U]/100ML
INJECTION, SOLUTION INTRAVENOUS
Status: COMPLETED | OUTPATIENT
Start: 2020-07-23 | End: 2020-07-23

## 2020-07-23 RX ADMIN — AMIODARONE HYDROCHLORIDE 400 MG: 200 TABLET ORAL at 18:54

## 2020-07-23 RX ADMIN — Medication 10 ML: at 11:33

## 2020-07-23 RX ADMIN — AMIODARONE HYDROCHLORIDE 400 MG: 200 TABLET ORAL at 11:31

## 2020-07-23 RX ADMIN — WARFARIN SODIUM 1.5 MG: 1 TABLET ORAL at 18:54

## 2020-07-23 RX ADMIN — Medication 10 ML: at 05:54

## 2020-07-23 RX ADMIN — AMIODARONE HYDROCHLORIDE 0.5 MG/MIN: 50 INJECTION, SOLUTION INTRAVENOUS at 06:32

## 2020-07-23 RX ADMIN — Medication 10 ML: at 22:26

## 2020-07-23 RX ADMIN — ENOXAPARIN SODIUM 90 MG: 100 INJECTION SUBCUTANEOUS at 14:51

## 2020-07-23 RX ADMIN — POLYETHYLENE GLYCOL 3350 17 G: 17 POWDER, FOR SOLUTION ORAL at 11:38

## 2020-07-23 RX ADMIN — Medication 10 ML: at 18:58

## 2020-07-23 NOTE — PROGRESS NOTES
[address] Adult  Hospitalist Group                                                                                          Hospitalist Progress Note  Momo Senior MD        Date of Service:  2020  NAME:  Deisi Montes De Oca  :  1945  MRN:  [id number]      Admission Summary:   72-year-old male presented to the emergency room with complaints of racing heart.   He was noted to have atrial fibrillation and hypotension  Interval history / Subjective:   No complaints this am, little tired and drowsy     Assessment & Plan:     Slow VT  -Status post failed AV carlos ablation ,   -reverted back to afib, now on amiodarone PO  -Cardiology following, cath unremarkable with mild atherosclerosis    Hypotension  -This is probably due to slow VT with RVR and poor perfusion    History of aortic valve replacement  -resume warfarin, bridge with lovenox    History of systolic CHF  -Stable, not in exacerbation  -Continue to monitor volume status  -Entresto and spironolactone was on hold due to hypotension    Elevated creatinine  -Improved    Code status: Cardiac  DVT prophylaxis: Coumadin/lovenox       Hospital Problems  Date Reviewed: 2020          Codes Class Noted POA    Tachycardia ICD-10-CM: R00.0  ICD-9-CM: 785.0  2020 Unknown        Tachy-donal syndrome ([facility] [address] ICD-10-CM: I49.5  ICD-9-CM: 427.81  2020 Unknown        * (Principal) Hypotension ICD-10-CM: I95.9  ICD-9-CM: 458.9  2020 Yes        H/O aortic valve replacement ICD-10-CM: Z95.2  ICD-9-CM: V43.3  2017 Yes    Overview Signed 2017  3:19 PM by Norwood Lefort, MD      @ [facility]              CVA (cerebral vascular accident) [facility]) ICD-10-CM: I63.9  ICD-9-CM: 434.91  2017 Yes        Pacemaker (Chronic) ICD-10-CM: Z95.0  ICD-9-CM: V45.01  2017 Yes    Overview Signed 2017 10:55 AM by Yumiko Snider     -- unsure if MRI compatible             TIA (transient ischemic attack) ICD-10-CM: G45.9  ICD-9-CM: 435.9  11/27/2017 Yes        CHF (congestive heart failure) (HCC) (Chronic) ICD-10-CM: I50.9  ICD-9-CM: 428.0  11/27/2017 Yes        HTN (hypertension) (Chronic) ICD-10-CM: I10  ICD-9-CM: 401.9  11/27/2017 Yes        Elevated serum creatinine ICD-10-CM: R79.89  ICD-9-CM: 790.99  11/27/2017 Yes        Chronic anticoagulation (Chronic) ICD-10-CM: Z79.01  ICD-9-CM: V58.61  11/27/2017 Yes                Review of Systems:   A comprehensive review of systems was negative except for that written in the HPI. Vital Signs:    Last 24hrs VS reviewed since prior progress note. Most recent are:  Visit Vitals  /84   Pulse (!) 125   Temp 97.5 °F (36.4 °C)   Resp 23   Ht 6' (1.829 m)   Wt 93.5 kg (206 lb 2.1 oz)   SpO2 96%   BMI 27.96 kg/m²         Intake/Output Summary (Last 24 hours) at 7/23/2020 1435  Last data filed at 7/23/2020 0700  Gross per 24 hour   Intake 600 ml   Output 250 ml   Net 350 ml        Physical Examination:             Constitutional:  No acute distress, cooperative, pleasant    ENT:  Oral mucosa moist, oropharynx benign. Resp:  CTA bilaterally. No wheezing/rhonchi/rales. No accessory muscle use   CV:  Regular rhythm, normal rate, no murmurs, gallops, rubs    GI:  Soft, non distended, non tender. normoactive bowel sounds, no hepatosplenomegaly     Musculoskeletal:  No edema, warm, 2+ pulses throughout    Neurologic:  Moves all extremities. AAOx3, CN II-XII reviewed     Psych:  Good insight, Not anxious nor agitated.        Data Review:    Review and/or order of clinical lab test  Decision to obtain old records and/or obtain history from someone other than the patient      Labs:     Recent Labs     07/23/20 0414 07/22/20  0426   WBC 6.4 6.6   HGB 12.3 12.6   HCT 38.6 39.7    169     Recent Labs     07/23/20  0414 07/22/20  0426 07/21/20  0601    143 140   K 4.2 4.2 5.1   * 115* 116*   CO2 20* 18* 18*   BUN 19 19 17   CREA 1.14 1.11 1.26   GLU 97 95 102*   CA 8.7 8.1* 8.1*   MG  --   --  1.8     No results for input(s): ALT, AP, TBIL, TBILI, TP, ALB, GLOB, GGT, AML, LPSE in the last 72 hours. No lab exists for component: SGOT, GPT, AMYP, HLPSE  Recent Labs     07/23/20  0414 07/22/20  0426 07/21/20  0601   INR 1.6* 2.6* 5.6*   PTP 15.6* 25.0* 50.1*      No results for input(s): FE, TIBC, PSAT, FERR in the last 72 hours. No results found for: FOL, RBCF   No results for input(s): PH, PCO2, PO2 in the last 72 hours. No results for input(s): CPK, CKNDX, TROIQ in the last 72 hours.     No lab exists for component: CPKMB  Lab Results   Component Value Date/Time    Cholesterol, total 135 11/28/2017 05:04 AM    HDL Cholesterol 24 11/28/2017 05:04 AM    LDL, calculated 48.8 11/28/2017 05:04 AM    Triglyceride 311 (H) 11/28/2017 05:04 AM    CHOL/HDL Ratio 5.6 (H) 11/28/2017 05:04 AM     Lab Results   Component Value Date/Time    Glucose (POC) 98 11/27/2017 08:23 PM    Glucose (POC) 94 11/27/2017 08:08 PM     Lab Results   Component Value Date/Time    Color YELLOW/STRAW 07/19/2020 07:05 PM    Appearance CLEAR 07/19/2020 07:05 PM    Specific gravity 1.012 07/19/2020 07:05 PM    pH (UA) 6.0 07/19/2020 07:05 PM    Protein Negative 07/19/2020 07:05 PM    Glucose Negative 07/19/2020 07:05 PM    Ketone Negative 07/19/2020 07:05 PM    Bilirubin Negative 07/19/2020 07:05 PM    Urobilinogen 0.2 07/19/2020 07:05 PM    Nitrites Negative 07/19/2020 07:05 PM    Leukocyte Esterase Negative 07/19/2020 07:05 PM    Epithelial cells FEW 07/19/2020 07:05 PM    Bacteria Negative 07/19/2020 07:05 PM    WBC 0-4 07/19/2020 07:05 PM    RBC 0-5 07/19/2020 07:05 PM         Medications Reviewed:     Current Facility-Administered Medications   Medication Dose Route Frequency    amiodarone (CORDARONE) tablet 400 mg  400 mg Oral BID    enoxaparin (LOVENOX) injection 90 mg  1 mg/kg SubCUTAneous Q12H    warfarin (COUMADIN) tablet 1.5 mg  1.5 mg Oral ONCE    sodium chloride (NS) flush 5-40 mL 5-40 mL IntraVENous PRN    sodium chloride (NS) flush 5-40 mL  5-40 mL IntraVENous Q8H    acetaminophen (TYLENOL) tablet 650 mg  650 mg Oral Q4H PRN    naloxone (NARCAN) injection 0.4 mg  0.4 mg IntraVENous PRN    ondansetron (ZOFRAN) injection 4 mg  4 mg IntraVENous Q4H PRN    Warfarin pharmacy to dose   Other Rx Dosing/Monitoring     ______________________________________________________________________  EXPECTED LENGTH OF STAY: 2d 9h  ACTUAL LENGTH OF STAY:          29 Anupama Quispe MD

## 2020-07-23 NOTE — PROGRESS NOTES
Shift Summary:    Bedside and Verbal shift change report given to Magno Ladd RN (oncoming nurse) by Tali Lei RN (offgoing nurse). Report included the following information SBAR, Kardex, Intake/Output, MAR, Recent Results, Cardiac Rhythm v tach/ afib at times and Alarm Parameters . See flowsheets and results for more details. On amio drip at 0.5. NPO at [facility]; plan for cath in AM.      Bedside and Verbal shift change report given to WENDIE Mercado (oncoming nurse) by Magno Ladd RN (offgoing nurse). Report included the following information SBAR, Kardex, Intake/Output, MAR, Recent Results, Cardiac Rhythm paced and Alarm Parameters .

## 2020-07-23 NOTE — ROUTINE PROCESS
Patient has arrived from cath lab. Report received from [facility]. Vital signs stable, no bleeding visible from radial cath site. Patient has new Hem Con band. Rep here to educate staff on use.

## 2020-07-23 NOTE — PROGRESS NOTES
[address] visit attempted. Mr. Rosa López had a procedure this morning and first visit was when he was returning to his room. Second attempt a doctor was talking with Mr. Rosa López. Prayer for spiritual communion offered for him.     JADA Jose, RN, ACSW, LCSW   Page:  [phone]

## 2020-07-23 NOTE — PROGRESS NOTES
Problem: Falls - Risk of  Goal: *Absence of Falls  Description: Document Van Fuentes Fall Risk and appropriate interventions in the flowsheet.   Outcome: Progressing Towards Goal  Variance Patient Condition  Impact: Moderate  Note: Fall Risk Interventions:  Mobility Interventions: Bed/chair exit alarm, Patient to call before getting OOB, Strengthening exercises (ROM-active/passive)         Medication Interventions: Assess postural VS orthostatic hypotension, Bed/chair exit alarm, Evaluate medications/consider consulting pharmacy, Patient to call before getting OOB, Teach patient to arise slowly, Utilize gait belt for transfers/ambulation    Elimination Interventions: Bed/chair exit alarm, Call light in reach, Toilet paper/wipes in reach, Toileting schedule/hourly rounds, Urinal in reach, Stay With Me (per policy)              Problem: Patient Education: Go to Patient Education Activity  Goal: Patient/Family Education  Outcome: Progressing Towards Goal

## 2020-07-23 NOTE — PROGRESS NOTES
1440-TRANSFER - IN REPORT:    Verbal report received from WENDIE Mercado(name) on Serg Ashby  being received from ICU(unit) for routine progression of care      Report consisted of patients Situation, Background, Assessment and   Recommendations(SBAR). Information from the following report(s) SBAR, Kardex, ED Summary, OR Summary, Procedure Summary, Intake/Output, MAR, Med Rec Status and Cardiac Rhythm Sinus Tach was reviewed with the receiving nurse. Opportunity for questions and clarification was provided. Assessment completed upon patients arrival to unit and care assumed. 1900-Bedside and Verbal shift change report given to \[facility]\"", RN (oncoming nurse) by Dayton Gomez RN (offgoing nurse). Report included the following information SBAR, Kardex, ED Summary, OR Summary, Procedure Summary, Recent Results, Med Rec Status and Cardiac Rhythm Paced.

## 2020-07-23 NOTE — PROGRESS NOTES
7/23/2020  11:00 AM  Update by chart review, pt is continuing to require medical management for AFib w/ RVR, hypotension, AVR on coumadin, systolic CHF    Transitions of Care Plan:     RUR 14%  LOS  1. CM to follow through for treatment/response  2. Cardiology following, failed cardioversion and ablation, plan for cardiac cath today  3. CM to follow for d/c needs  4. Pt would benefit from PT/OT evals when stable to determine skilled needs at d/c  5. D/ C when stable to home w/ family assistance  6.  Outpatient f/u cardiology, PCP  VISHAL Kirkpatrick

## 2020-07-23 NOTE — PROGRESS NOTES
[facility] Dosing Services: 7/20/20    Consult for Warfarin Dosing by Pharmacy by Dr. Vivian Murphy provided for this 69 y.o.  male , for indication of Mechanical AVR  Day of Therapy Continued from home. The patient takes 2.5 mg in the evening on Monday, Wednesday, Thursday, Friday, Saturday, Sunday. On Tuesday the patient takes 3.75 mg. Dose to achieve an INR goal of 2.5- 3.5    A/P:  INR 1.6 today  s/p Vitamin K 5 mg on 7/21/20  Had Cath today  Will resume Warfarin per consult  Resume at 1.5 dose (pre-emptive dose reduction as Amiodarone is now on board)  Bridging with treatment dose Lovenox    Significant drug interactions: Amiodarone, Lovenox, Vitamin K 5 mg on 7/21/20  Previous dose given 2 mg on 7/20   PT/INR Lab Results   Component Value Date/Time    INR 1.6 (H) 07/23/2020 04:14 AM      Platelets Lab Results   Component Value Date/Time    PLATELET 490 79/64/2037 04:14 AM      H/H Lab Results   Component Value Date/Time    HGB 12.3 07/23/2020 04:14 AM        Pharmacy to follow daily and will provide subsequent Warfarin dosing based on clinical status.   [address] [facility] [place])  Contact information [phone]

## 2020-07-23 NOTE — PROGRESS NOTES
9:54 AM    TRANSFER - IN REPORT:    Verbal report received from George Zimmerman RN(name) on [employer]  being received from ICU(unit) for routine progression of care      Report consisted of patients Situation, Background, Assessment and   Recommendations(SBAR). Information from the following report(s) SBAR was reviewed with the receiving nurse. Opportunity for questions and clarification was provided. Assessment completed upon patients arrival to unit and care assumed. 10:07 AM    TRANSFER - OUT REPORT:    Verbal report given to Ahmet Anupama Ashton RN(name) on [employer]  being transferred to CATH LAB(unit) for ordered procedure       Report consisted of patients Situation, Background, Assessment and   Recommendations(SBAR). Information from the following report(s) SBAR was reviewed with the receiving nurse. Lines:   Peripheral IV 07/19/20 Right Antecubital (Active)   Site Assessment Clean, dry, & intact 07/23/20 0800   Phlebitis Assessment 0 07/23/20 0800   Infiltration Assessment 0 07/23/20 0800   Dressing Status Clean, dry, & intact 07/23/20 0800   Dressing Type Transparent 07/23/20 0800   Hub Color/Line Status Flushed;Capped 07/23/20 0800   Action Taken Open ports on tubing capped 07/23/20 0800   Alcohol Cap Used Yes 07/23/20 0800       Peripheral IV 07/20/20 Right; Inner Antecubital (Active)   Site Assessment Clean, dry, & intact 07/23/20 0800   Phlebitis Assessment 0 07/23/20 0800   Infiltration Assessment 0 07/23/20 0800   Dressing Status Clean, dry, & intact 07/23/20 0800   Dressing Type Transparent 07/23/20 0800   Hub Color/Line Status Flushed;Capped 07/23/20 0800   Action Taken Open ports on tubing capped 07/23/20 0800   Alcohol Cap Used Yes 07/23/20 0800        Opportunity for questions and clarification was provided.       Patient transported with:   Registered Nurse

## 2020-07-23 NOTE — PROGRESS NOTES
1915: Bedside and Verbal shift change report given to Squire Schilder RN (oncoming nurse) by Yolanda Lozano and Nayana Grey RN (offgoing nurse). Report included the following information SBAR, Kardex, Intake/Output, MAR and Recent Results. 0600: No acute changes overnight. 0720: Bedside and Verbal shift change report given to Srikanth Zhao RN and [address] [facility] [address] (oncoming nurse) by Squire Schilder RN (offgoing nurse). Report included the following information SBAR, Kardex, Intake/Output and Recent Results.

## 2020-07-23 NOTE — PROGRESS NOTES
Cardiology Progress Note         NAME:  Bailey Gold   :   1945   MRN:   [id number]     Assessment/Plan:   1. A fib s/p AV node ablation. Resume coumadin post cath . Will need lovenox bridge 2/2 AVR  2. S/P AVR: mechanical. Warfarin . Monitors INR at home and sends to [facility] coumadin clinic Q 2 weeks   3. Slow VT: device reprogrammed . For cath today to eval for ischemia. On IV amio for now   4. Hypotension:   5. Systolic CHF/cardiomyopathy   6. ICD:     Further recs post cath     CARDIOLOGY ATTENDING  Patient personally seen and examined. All the elements of history and examination were personally performed. Assessment and plan was discussed and agree as written above    Hrt RRR with 2/6 systolic murmur and normal valve clicks. Lungs clear ant. I saw Mr. Dung Green today at request of Dr. Ashleigh Vu. He is seen post-cath. Cath without significant CAD. He has a non-ischemic cardiomyopathy. Dr. Ashleigh Vu recommended Amiodarone load (start with 400 mg BID). Patient will FU with Dr. Dominic Crisostomo as an outpatient. Ariel Watts MD, [facility]               Subjective:   Cardiac ROS: Patient denies any exertional chest pain, dyspnea, palpitations, syncope, orthopnea, edema or paroxysmal nocturnal dyspnea. Previous Cardiac Eval  20   ECHO ABELARDO W OR WO CONTRAST 2020    Narrative · Moderately dilated left atrium. Left atrial appendage velocity is   reduced (less than 40 cm/sec). · Mildly dilated left ventricle. Severe global systolic function. Estimated left ventricular ejection fraction is 25 - 30%. Visually   measured ejection fraction. · Mildly dilated right ventricle. Mildly reduced systolic function. Pacer/ICD present. · Severely dilated right atrium. · Color flow Doppler was used. · Prosthetic aortic valve. There is a bileaflet tilting disc mechanical   aortic valve. Prosthesis is normal.  · Mitral valve thickening. Myxomatous mitral valve disease. Mild mitral   valve regurgitation is present. · Dilated annulus of the tricuspid valve. Moderate tricuspid valve   regurgitation is present. · No LA/MAYDA thrombus  · Tranthoracic views also obtained to assess mechanical valve and LV   function. Signed by: Dominic Castro MD     EKG 17 - V paced, PVC's     ABELARDO/DCC 17 - LVEF 50-55%; Severe RV dilation and mod dysfunction. PFO with mod shunting. Normal mechanical AVR (mean gradient 4 mmHg), mod MR. DCCV 17-> NSR      ECHO 17 - TDS. Limited study. LVEF 50-55%, Mod RV dilation with RV dysfunction, CHARLIE, RVSP 37   ECHO 2017: EF 55-60%, now WMA, mild reduced RVEF, mod LAE, mild CHARLIE, mild-mod MR, mechanical AVR- trivial AI, mild TR, RVSP 36,   AV Vmax was 1.4 m/s.  AV maxPG was 7.9 mmHg. CT Head 17 - no acute abn   Carotid Doppler 17 - 0-49% stenosis bilat    [facility] records reviewed 2020: followed by Dr Ary Minor (HF clinic)   Permanent a fib  CHF   EF 30-35%    s/p mechanical AVR [place] 2004 medtronic Claria MRI CRT-D bi V ICD. V pacing 91% 1/15/2020 device function nml  Chronic systolic CHF:   ECHO : AVR nml function, Mild MR, TR, LVEF 30-35% , RV function mod reduced. 19 RHC: RA 11/8, RV 30/8, PA, PCW 15 CI 1.48    Review of Systems: No nausea, indigestion, vomiting, pain, cough, sputum. No bleeding. NPO for cath. Objective:     Visit Vitals  BP (!) 148/93 (BP 1 Location: Left arm, BP Patient Position: At rest)   Pulse (!) 101   Temp 97.3 °F (36.3 °C)   Resp 22   Ht 6' (1.829 m)   Wt 206 lb 2.1 oz (93.5 kg)   SpO2 97%   BMI 27.96 kg/m²    O2 Flow Rate (L/min): 2 l/min O2 Device: Nasal cannula    Temp (24hrs), Av.8 °F (36.6 °C), Min:97.3 °F (36.3 °C), Max:98.7 °F (37.1 °C)      No intake/output data recorded. 1901 -  0700  In: 1023.7 [P.O.:300; I.V.:723.7]  Out: 625 [Urine:625]  TELE: paced, PVC's. General: AAOx3 cooperative, no acute distress.   HEENT: Atraumatic. Pink and moist.  Anicteric sclerae. Neck : Supple  Lungs: CTA bilaterally. No wheezing/rhonchi/rales. Heart: Regular rhythm, no murmur, no rubs, no gallops. No JVD. No carotid bruits. Abdomen: Soft, non-distended, non-tender. + Bowel sounds. Extremities: Edema hands,   No LE edema, no clubbing, no cyanosis. Neurologic: Grossly intact. Alert and oriented X 3. No acute neurological distress. Psych: Good insight. Not anxious or agitated. Care Plan discussed with:    Comments   Patient x    Family  x    RN x    Care Manager                    Consultant:  mary lou        Data Review:     No lab exists for component: ITNL   No results for input(s): CPK, CKMB, TROIQ in the last 72 hours.   Recent Labs     07/23/20 0414 07/22/20 0426 07/21/20  0601    143 140   K 4.2 4.2 5.1   * 115* 116*   CO2 20* 18* 18*   BUN 19 19 17   CREA 1.14 1.11 1.26   GLU 97 95 102*   MG  --   --  1.8   WBC 6.4 6.6 8.0   HGB 12.3 12.6 13.6   HCT 38.6 39.7 43.0    169 200     Recent Labs     07/23/20 0414 07/22/20  0426 07/21/20  0601   INR 1.6* 2.6* 5.6*   PTP 15.6* 25.0* 50.1*       Medications reviewed  Current Facility-Administered Medications   Medication Dose Route Frequency    amiodarone (CORDARONE) 375 mg in dextrose 5% 250 mL infusion  0.5-1 mg/min IntraVENous TITRATE    sodium chloride (NS) flush 5-40 mL  5-40 mL IntraVENous PRN    esmolol 10mg/mL (BREVIBLOC) infusion  0-200 mcg/kg/min IntraVENous TITRATE    sodium chloride (NS) flush 5-40 mL  5-40 mL IntraVENous Q8H    acetaminophen (TYLENOL) tablet 650 mg  650 mg Oral Q4H PRN    naloxone (NARCAN) injection 0.4 mg  0.4 mg IntraVENous PRN    ondansetron (ZOFRAN) injection 4 mg  4 mg IntraVENous Q4H PRN    Warfarin pharmacy to dose   Other Rx Dosing/Monitoring         Charu Vital NP

## 2020-07-23 NOTE — PROCEDURES
BRIEF PROCEDURE NOTE    Date of Procedure: 7/23/2020   Preoperative Diagnosis: VT/ Mechanical AVR  Postoperative Diagnosis: No sig CAD    Procedure: Left heart cath, LV angiography, coronary angiography  Interventional Cardiologist: Clementine Lozano MD  Assistant : none  Anesthesia: local + IV sedation  Estimated Blood Loss: Minimal  Findings:      R radial access - 6 F sheath    L Main: Med to Large- Nml    LAD: Med to large - MLI; D1 - Nml    LCflex: Med; MLI; OM1 - Med -Nml; Bifurcates into 2 branches distally    RCA:  Subselective; 3DRC ; Med; Prox/mid - Nml; Distal small - PDA and PLB - small    LVEDP: Mechanical valve/ Valve not crossed    PCI: none      Specimens Removed : None    Complications: None    Closure Device: Radial band (Daniel Pulse)        See full cath note.     Complications: none    Clementine Lozano MD

## 2020-07-24 VITALS
WEIGHT: 203.3 LBS | DIASTOLIC BLOOD PRESSURE: 63 MMHG | HEIGHT: 72 IN | BODY MASS INDEX: 27.54 KG/M2 | TEMPERATURE: 98 F | SYSTOLIC BLOOD PRESSURE: 118 MMHG | HEART RATE: 102 BPM | OXYGEN SATURATION: 98 % | RESPIRATION RATE: 20 BRPM

## 2020-07-24 LAB
ANION GAP SERPL CALC-SCNC: 7 MMOL/L (ref 5–15)
BASOPHILS # BLD: 0 K/UL (ref 0–0.1)
BASOPHILS NFR BLD: 1 % (ref 0–1)
BNP SERPL-MCNC: 9679 PG/ML
BUN SERPL-MCNC: 17 MG/DL (ref 6–20)
BUN/CREAT SERPL: 14 (ref 12–20)
CALCIUM SERPL-MCNC: 8.6 MG/DL (ref 8.5–10.1)
CHLORIDE SERPL-SCNC: 111 MMOL/L (ref 97–108)
CO2 SERPL-SCNC: 19 MMOL/L (ref 21–32)
CREAT SERPL-MCNC: 1.21 MG/DL (ref 0.7–1.3)
DIFFERENTIAL METHOD BLD: ABNORMAL
EOSINOPHIL # BLD: 0.1 K/UL (ref 0–0.4)
EOSINOPHIL NFR BLD: 1 % (ref 0–7)
ERYTHROCYTE [DISTWIDTH] IN BLOOD BY AUTOMATED COUNT: 15 % (ref 11.5–14.5)
GLUCOSE SERPL-MCNC: 100 MG/DL (ref 65–100)
HCT VFR BLD AUTO: 41.4 % (ref 36.6–50.3)
HGB BLD-MCNC: 13.2 G/DL (ref 12.1–17)
IMM GRANULOCYTES # BLD AUTO: 0 K/UL (ref 0–0.04)
IMM GRANULOCYTES NFR BLD AUTO: 1 % (ref 0–0.5)
INR PPP: 1.5 (ref 0.9–1.1)
LYMPHOCYTES # BLD: 1.4 K/UL (ref 0.8–3.5)
LYMPHOCYTES NFR BLD: 18 % (ref 12–49)
MCH RBC QN AUTO: 29.8 PG (ref 26–34)
MCHC RBC AUTO-ENTMCNC: 31.9 G/DL (ref 30–36.5)
MCV RBC AUTO: 93.5 FL (ref 80–99)
MONOCYTES # BLD: 0.6 K/UL (ref 0–1)
MONOCYTES NFR BLD: 8 % (ref 5–13)
NEUTS SEG # BLD: 5.8 K/UL (ref 1.8–8)
NEUTS SEG NFR BLD: 71 % (ref 32–75)
NRBC # BLD: 0.02 K/UL (ref 0–0.01)
NRBC BLD-RTO: 0.3 PER 100 WBC
PLATELET # BLD AUTO: 184 K/UL (ref 150–400)
PMV BLD AUTO: 11.3 FL (ref 8.9–12.9)
POTASSIUM SERPL-SCNC: 4.4 MMOL/L (ref 3.5–5.1)
PROTHROMBIN TIME: 15 SEC (ref 9–11.1)
RBC # BLD AUTO: 4.43 M/UL (ref 4.1–5.7)
SODIUM SERPL-SCNC: 137 MMOL/L (ref 136–145)
WBC # BLD AUTO: 7.9 K/UL (ref 4.1–11.1)

## 2020-07-24 PROCEDURE — 85610 PROTHROMBIN TIME: CPT

## 2020-07-24 PROCEDURE — 85025 COMPLETE CBC W/AUTO DIFF WBC: CPT

## 2020-07-24 PROCEDURE — 97161 PT EVAL LOW COMPLEX 20 MIN: CPT

## 2020-07-24 PROCEDURE — 80048 BASIC METABOLIC PNL TOTAL CA: CPT

## 2020-07-24 PROCEDURE — 74011250637 HC RX REV CODE- 250/637: Performed by: NURSE PRACTITIONER

## 2020-07-24 PROCEDURE — 74011250636 HC RX REV CODE- 250/636: Performed by: NURSE PRACTITIONER

## 2020-07-24 PROCEDURE — 83880 ASSAY OF NATRIURETIC PEPTIDE: CPT

## 2020-07-24 PROCEDURE — 97116 GAIT TRAINING THERAPY: CPT

## 2020-07-24 PROCEDURE — 36415 COLL VENOUS BLD VENIPUNCTURE: CPT

## 2020-07-24 RX ORDER — ASPIRIN 81 MG/1
81 TABLET ORAL
Status: DISCONTINUED | OUTPATIENT
Start: 2020-07-24 | End: 2020-07-24 | Stop reason: HOSPADM

## 2020-07-24 RX ORDER — ENOXAPARIN SODIUM 100 MG/ML
1 INJECTION SUBCUTANEOUS EVERY 12 HOURS
Qty: 14 ML | Refills: 0 | Status: SHIPPED | OUTPATIENT
Start: 2020-07-24 | End: 2020-08-12 | Stop reason: ALTCHOICE

## 2020-07-24 RX ORDER — AMIODARONE HYDROCHLORIDE 400 MG/1
TABLET ORAL
Qty: 38 TAB | Refills: 0 | Status: SHIPPED | OUTPATIENT
Start: 2020-07-24 | End: 2020-08-12

## 2020-07-24 RX ORDER — SPIRONOLACTONE 25 MG/1
12.5 TABLET ORAL 2 TIMES DAILY
Status: DISCONTINUED | OUTPATIENT
Start: 2020-07-24 | End: 2020-07-24 | Stop reason: HOSPADM

## 2020-07-24 RX ORDER — PRAVASTATIN SODIUM 20 MG/1
40 TABLET ORAL
Status: DISCONTINUED | OUTPATIENT
Start: 2020-07-24 | End: 2020-07-24 | Stop reason: HOSPADM

## 2020-07-24 RX ORDER — WARFARIN 1 MG/1
1.5 TABLET ORAL ONCE
Status: DISCONTINUED | OUTPATIENT
Start: 2020-07-24 | End: 2020-07-24 | Stop reason: HOSPADM

## 2020-07-24 RX ADMIN — ENOXAPARIN SODIUM 90 MG: 100 INJECTION SUBCUTANEOUS at 01:27

## 2020-07-24 RX ADMIN — ENOXAPARIN SODIUM 90 MG: 100 INJECTION SUBCUTANEOUS at 13:47

## 2020-07-24 RX ADMIN — SPIRONOLACTONE 12.5 MG: 25 TABLET ORAL at 09:34

## 2020-07-24 RX ADMIN — Medication 10 ML: at 13:51

## 2020-07-24 RX ADMIN — Medication 10 ML: at 06:06

## 2020-07-24 RX ADMIN — AMIODARONE HYDROCHLORIDE 400 MG: 200 TABLET ORAL at 09:34

## 2020-07-24 RX ADMIN — Medication 10 ML: at 13:52

## 2020-07-24 NOTE — PROGRESS NOTES
[facility] Pharmacy Dosing Services: 7/24/2020    Consult for Warfarin Dosing by Pharmacy by Dr. Guillermo Lemus provided for this 69 y.o.  male , for indication of Mechanical AVR  Day of Therapy Continued from home. The patient takes 2.5 mg in the evening on Monday, Wednesday, Thursday, Friday, Saturday, Sunday. On Tuesday the patient takes 3.75 mg. Dose to achieve an INR goal of 2.5- 3.5    Order entered for  Warfarin  1.5 (mg) ordered to be given today at 18:00. Pre-emptive dose reduction as Amiodarone is now on board  Significant drug interactions: amiodarone,enoxaparin bridge,Vitamin K 5 mg on 7/21/2020  Previous dose given 1.5 mg   PT/INR Lab Results   Component Value Date/Time    INR 1.5 (H) 07/24/2020 01:31 AM      Platelets Lab Results   Component Value Date/Time    PLATELET 873 70/23/7637 01:31 AM      H/H Lab Results   Component Value Date/Time    HGB 13.2 07/24/2020 01:31 AM        Pharmacy to follow daily and will provide subsequent Warfarin dosing based on clinical status.   [place] [place])  Contact information [phone]

## 2020-07-24 NOTE — DISCHARGE INSTRUCTIONS
Discharge Instructions       PATIENT ID: Lazaro Hirsch  MRN: [id number]   YOB: 1945    DATE OF ADMISSION: 7/19/2020  4:50 PM    DATE OF DISCHARGE: 7/24/2020    PRIMARY CARE PROVIDER: Christina Jacob MD     ATTENDING PHYSICIAN: Maria Elena Mills MD  DISCHARGING PROVIDER: Sirena Gu MD        DISCHARGE DIAGNOSES  1. Slow ventricular tachycardia  2. Hypotension  3. Aortic valve replacement  4. Systolic CHF, chronic  5. Elevated creatinine    CONSULTATIONS: IP CONSULT TO CARDIOLOGY    PROCEDURES/SURGERIES: Procedure(s):  CORONARY ANGIOGRAPHY    PENDING TEST RESULTS:   At the time of discharge the following test results are still pending: None    FOLLOW UP APPOINTMENTS:   Follow-up Information     Follow up With Specialties Details Why Contact Susie Bajwa MD Cardiology In 4 weeks office will call with date/time of appt  [address] [facility]  [address] [facility] In 2 weeks  [address] [facility] [address] [facility]      Homero Skelton MD Internal Medicine In 2 weeks  [address] [facility] [phone]      Christina Jacob MD Family Medicine On 7/30/2020 Hospital discharge follow up on Thursday, July 30th at 2:30PM. [address] [facility]  [address] [facility]  [phone]             ADDITIONAL CARE RECOMMENDATIONS: Continue taking amiodarone twice daily through Monday and then take it once daily. Continue taking your usual dose of warfarin and continue with Lovenox injections twice a day until your INR is between 2.5-3.5. You will need to follow-up closely with the Coumadin clinic at [facility] since amiodarone can make your warfarin levels increase quickly. Stop taking Lovenox once you are INR is above 2.5. [facility] [place] has been placed on hold for now due to low blood pressure. Discussed with Dr. Garcia Been regarding further recommendations.     DIET: Cardiac    ACTIVITY: As tolerated      DISCHARGE MEDICATIONS:   See Medication Reconciliation Form    · It is important that you take the medication exactly as they are prescribed. · Keep your medication in the bottles provided by the pharmacist and keep a list of the medication names, dosages, and times to be taken in your wallet. · Do not take other medications without consulting your doctor. NOTIFY YOUR PHYSICIAN FOR ANY OF THE FOLLOWING:   Fever over 101 degrees for 24 hours. Chest pain, shortness of breath, fever, chills, nausea, vomiting, diarrhea, change in mentation, falling, weakness, bleeding. Severe pain or pain not relieved by medications. Or, any other signs or symptoms that you may have questions about. DISPOSITION:  x  Home With:   OT  PT  HH  RN       SNF/Inpatient Rehab/LTAC    Independent/assisted living    Hospice    Other:     CDMP Checked:   Yes ***     PROBLEM LIST Updated:  Yes ***       Signed:   Marimar Lugo MD  7/24/2020  3:26 PM            Electrophysiology Study (EPS)/Cardiac Ablation   Discharge Instructions      You have just had a Cardiac Ablation for: atrial fibrillation. There were catheters temporarily placed in your heart through a puncture in the Veins and/or Arteries in your groin. WHAT TO EXPECT      If you have had a Cardiac Ablation please be aware that you may experience mild chest pain that will resolve within 24-48 hours. If the Chest Pain begins radiating down your shoulders or arms, becomes severe or breathing becomes difficult, call 911 or go to the closest emergency room.  Mild to moderate, non-painful, bruising or mild swelling at the puncture site is not un-common, and will resolve in 7 - 14 days, and may extend down your thigh as it heals.  If a closure device was used to seal your artery or vein, a separate pamphlet will be given to you with these discharge instructions.   It is very important that you review the information in the pamphlet for different restrictions and precautions.  You have a small gauze dressing applied to the puncture site in your groin. You may remove this the following morning. MEDICATIONS      Take only the medications prescribed to you at discharge. ACTIVITY      A responsible adult must take you home. Do not drive a car for 24 hours.  Rest quietly for the remainder of the day.  Do not lift anything greater than 10 pounds for 3 days.  Limit bending at the puncture site and use of stairs for at least 3 days.  You may remove the bandage and shower the morning after the procedure. Do not take a bath for 3 days. SYMPTOMS THAT NEED TO BE REPORTED IMMEDIATELY      Bleeding at the puncture site. If there is bleeding, lie down and hold firm direct pressure for at least 5 minutes. If the bleeding does not stop, go to the closest emergency room, or call 911.  Temperature more than 100.5 F.   Redness or warmth at the puncture site.  Increasing pain, numbness, coolness or blue discoloration of the extremity where the puncture is located.  Pulsating mass at the puncture site.  A new lump at the puncture site, or increasing swelling at the site. Please be aware that a pea or marble sized lump is normal, anything larger or increasing in size should be reported.  Bruising at the puncture site that enlarges or becomes painful (some bruising at the site is common and will go away in 7-14 days).  Rapid heart rate or palpitations.  Dizziness, lightheadedness, fainting.  REMEMBER: If you feel something is an emergency or cannot be handled over the phone, call 911 or go to the closest emergency room.       FOLLOW UP CARE     Samson Escudero NP in 2 weeks  Dr. Delgado Knows in 3 months        Diamante Mendoza MD  Cardiac Electrophysiology / Cardiology    [address]  [address] [facility], [address] [facility], [facility] [address] [place] [address], [address] [place], [place] [address]Mehdi  [phone] / [phone] Fax   [phone] / [phone] Fax        Patient Education        Avoiding Triggers With Heart Failure: Care Instructions  Your Care Instructions     Triggers are anything that make your heart failure flare up. A flare-up is also called \"sudden heart failure\" or \"acute heart failure. \" When you have a flare-up, fluid builds up in your lungs, and you have problems breathing. You might need to go to the hospital. By watching for changes in your condition and avoiding triggers, you can prevent heart failure flare-ups. Follow-up care is a key part of your treatment and safety. Be sure to make and go to all appointments, and call your doctor if you are having problems. It's also a good idea to know your test results and keep a list of the medicines you take. How can you care for yourself at home? Watch for changes in your weight and condition  · Weigh yourself without clothing at the same time each day. Record your weight. Call your doctor if you have sudden weight gain, such as more than 2 to 3 pounds in a day or 5 pounds in a week. (Your doctor may suggest a different range of weight gain.) A sudden weight gain may mean that your heart failure is getting worse. · Keep a daily record of your symptoms. Write down any changes in how you feel, such as new shortness of breath, cough, or problems eating. Also record if your ankles are more swollen than usual and if you feel more tired than usual. Note anything that you ate or did that could have triggered these changes. Limit sodium  Sodium causes your body to hold on to extra water. This may cause your heart failure symptoms to get worse. People get most of their sodium from processed foods. Fast food and restaurant meals also tend to be very high in sodium. · Your doctor may suggest that you limit sodium. Your doctor can tell you how much sodium is right for you. This includes limiting sodium in cooked and packaged foods. · Read food labels on cans and food packages. They tell you how much sodium you get in one serving. Check the serving size. If you eat more than one serving, you are getting more sodium. · Be aware that sodium can come in forms other than salt, including monosodium glutamate (MSG), sodium citrate, and sodium bicarbonate (baking soda). MSG is often added to Asian food. You can sometimes ask for food without MSG or salt. · Slowly reducing salt will help you adjust to the taste. Take the salt shaker off the table. · Flavor your food with garlic, lemon juice, onion, vinegar, herbs, and spices instead of salt. Do not use soy sauce, steak sauce, onion salt, garlic salt, mustard, or ketchup on your food, unless it is labeled \"low-sodium\" or \"low-salt. \"  · Make your own salad dressings, sauces, and ketchup without adding salt. · Use fresh or frozen ingredients, instead of canned ones, whenever you can. Choose low-sodium canned goods. · Eat less processed food and food from restaurants, including fast food. Exercise as directed  Moderate, regular exercise is very good for your heart. It improves your blood flow and helps control your weight. But too much exercise can stress your heart and cause a heart failure flare-up. · Check with your doctor before you start an exercise program.  · Walking is an easy way to get exercise. Start out slowly. Gradually increase the length and pace of your walk. Swimming, riding a bike, and using a treadmill are also good forms of exercise. · When you exercise, watch for signs that your heart is working too hard. You are pushing yourself too hard if you cannot talk while you are exercising. If you become short of breath or dizzy or have chest pain, stop, sit down, and rest.  · Do not exercise when you do not feel well. Take medicines correctly  · Take your medicines exactly as prescribed.  Call your doctor if you think you are having a problem with your medicine. · Make a list of all the medicines you take. Include those prescribed to you by other doctors and any over-the-counter medicines, vitamins, or supplements you take. Take this list with you when you go to any doctor. · Take your medicines at the same time every day. It may help you to post a list of all the medicines you take every day and what time of day you take them. · Make taking your medicine as simple as you can. Plan times to take your medicines when you are doing other things, such as eating a meal or getting ready for bed. This will make it easier to remember to take your medicines. · Get organized. Use helpful tools, such as daily or weekly pill containers. When should you call for help? Call 911 if you have symptoms of sudden heart failure such as:  · You have severe trouble breathing. · You cough up pink, foamy mucus. · You have a new irregular or rapid heartbeat. Call your doctor now or seek immediate medical care if:  · You have new or increased shortness of breath. · You are dizzy or lightheaded, or you feel like you may faint. · You have sudden weight gain, such as more than 2 to 3 pounds in a day or 5 pounds in a week. (Your doctor may suggest a different range of weight gain.)  · You have increased swelling in your legs, ankles, or feet. · You are suddenly so tired or weak that you cannot do your usual activities. Watch closely for changes in your health, and be sure to contact your doctor if you develop new symptoms. Where can you learn more? Go to http://linden-carlton.info/  Enter V089 in the search box to learn more about \"Avoiding Triggers With Heart Failure: Care Instructions. \"  Current as of: December 16, 2019               Content Version: 12.5  © 9351-5788 [employer]. Care instructions adapted under license by [employer] (which disclaims liability or warranty for this information).  If you have questions about a medical condition or this instruction, always ask your healthcare professional. [address] any warranty or liability for your use of this information. Radial Cardiac Catheterization/Angiography Discharge Instructions   It is normal to feel tired the first couple days. Take it easy and follow the physicians instructions. CHECK THE CATHETER INSERTION SITE DAILY:   Remove the wrist dressing 24 hours after the procedure. You may shower 24 hours after the procedure. Wash with soap and water and pat dry. Gentle cleaning of the site with soap and water is sufficient, cover with a dry clean dressing or bandage. Do not apply creams or powders to the area. No soaking the wrist for 3 days. Leave the puncture site open to air after 24 hours post-procedure. CALL THE PHYSICIANS:   If the site becomes red, swollen or feels warm to the touch   If there is bleeding or drainage or if there is unusual pain at the radial site. If there is any minor oozing, you may apply a band-aid and remove after 12 hours. If the bleeding continues, hold pressure with the middle finger against the puncture site and the thumb against the back of the wrist,call 911 to be transported to the hospital.   DO NOT DRIVE YOURSELF, [facility] ACTIVITY:   For the first 24 hours do not manipulate the wrist.   No lifting, pushing or pulling over 3-5 pounds with the affected wrist for 7 daysand no straining the insertion site. Do not life grocery bags or the garbage can, do not run the vacuum  or  for 7 days. Start with short walks as in the hospital and gradually increase as tolerated each day. It is recommended to walk 30 minutes 5-7 days per week. Follow your physicians instructions on activity. Avoid walking outside in extremes of heat or cold. Walk inside when it is cold and windy or hot and humid.    Things to keep in mind:   No driving for at least 24 hours, or as designated by your physician. Limit the number of times you go up and down the stairs   Take rests and pace yourself with activity. Be careful and do not strain with bowel movements. MEDICATIONS:   Take all medications as prescribed   Call your physician if you have any questions   Keep an updated list of your medications with you at all times and give a list to your physician and pharmacist   SIGNS AND SYMPTOMS:   Be cautious of symptoms of angina or recurrent symptoms such as chest discomfort, unusual shortness of breath or fatigue. These could be symptoms of restenosis, a new blockage or a heart attack. If your symptoms are relieved with rest it is still recommended that you notify your physician of recurrent chest pain or discomfort. For CHEST PAIN or symptoms of angina not relieved with rest: If the discomfort is not relieved with rest, and you have been prescribed Nitroglycerin, take as directed (taken under the tongue, one at a time 5 minutes apart for a total of 3 doses). If the discomfort is not relieved after the 3rd nitroglycerin, call 911. If you have not been prescribed Nitroglycerin and your chest discomfort is not relieved with rest, call 911. AFTER CARE:   Follow up with your physician as instructed. Follow a heart healthy diet with proper portion control, daily stress management, daily exercise, blood pressure and cholesterol control , and smoking cessation.

## 2020-07-24 NOTE — PROGRESS NOTES
2848 - Bedside shift change report given to Gela Wong RN by Mark Mary RN (offgoing nurse). Report included the following information SBAR, Kardex, ED Summary, OR Summary, MAR, Recent Results and Cardiac Rhythm Paced.

## 2020-07-24 NOTE — PROGRESS NOTES
Cardiology Progress Note      [facility]    NAME:  Bailey Gold   :   1945   MRN:   [id number]     Assessment/Plan:   1. A fib s/p AV node ablation. Resume coumadin post cath . Will need lovenox bridge 2/2 AVR  2. S/P AVR: mechanical. Warfarin with lovenox bridge. Pt cks his own INR's at home. 3. Slow VT: device reprogrammed . Cath  with no CAD. Cont po amio at 400 mg BID through Monday then 400 mg every day. OP follow up Dr Dominic Crisostomo   4. Systolic CHF/cardiomyopathy : resume aldactone, hold on entresto resumption 2/2 low BP. OP follow up Dr Cheli Bueno at [address] [place] [address].   5. ICD:   6.  deconditioned: encourage ambulation. [id number] [address] Falcon for discharge today from cardiac standpoint       CARDIOLOGY ATTENDING  Patient personally seen and examined. All the elements of history and examination were personally performed. Assessment and plan was discussed and agree as written above    Patient says he feels well enough to go home. Hrt RRR with normal valve clicks. Lungs clear. For VT - plan for amio 400 BID for one week total and then 400 mg daily  -- FU with Dr. Dominic Crisostomo    For his mechanical AVR - plan for coumadin with Lovenox bridge. Goal INR 3 (2.5-3.5). He followed his INR at home and I asked him to follow INR closely given coumadin-amiodarone interactions         Ariel Watts MD, [facility]               Subjective:   Cardiac ROS: Patient denies any exertional chest pain, dyspnea, palpitations, syncope, orthopnea, edema or paroxysmal nocturnal dyspnea. Previous Cardiac Eval  20   ECHO ABELARDO W OR WO CONTRAST 2020    Narrative · Moderately dilated left atrium. Left atrial appendage velocity is   reduced (less than 40 cm/sec). · Mildly dilated left ventricle. Severe global systolic function. Estimated left ventricular ejection fraction is 25 - 30%. Visually   measured ejection fraction. · Mildly dilated right ventricle. Mildly reduced systolic function.    Pacer/ICD present. · Severely dilated right atrium. · Color flow Doppler was used. · Prosthetic aortic valve. There is a bileaflet tilting disc mechanical   aortic valve. Prosthesis is normal.  · Mitral valve thickening. Myxomatous mitral valve disease. Mild mitral   valve regurgitation is present. · Dilated annulus of the tricuspid valve. Moderate tricuspid valve   regurgitation is present. · No LA/MAYDA thrombus  · Tranthoracic views also obtained to assess mechanical valve and LV   function. Signed by: Helen Bai MD     EKG 17 - V paced, PVC's     ABELARDO/DCC 17 - LVEF 50-55%; Severe RV dilation and mod dysfunction. PFO with mod shunting. Normal mechanical AVR (mean gradient 4 mmHg), mod MR. DCCV 17-> NSR      ECHO 17 - TDS. Limited study. LVEF 50-55%, Mod RV dilation with RV dysfunction, CHARLIE, RVSP 37   ECHO 2017: EF 55-60%, now WMA, mild reduced RVEF, mod LAE, mild CHARLIE, mild-mod MR, mechanical AVR- trivial AI, mild TR, RVSP 36,   AV Vmax was 1.4 m/s.  AV maxPG was 7.9 mmHg. CT Head 17 - no acute abn   Carotid Doppler 17 - 0-49% stenosis bilat    [facility] records reviewed 2020: followed by Dr Genesis Reza (HF clinic)   Permanent a fib  CHF   EF 30-35%    s/p mechanical AVR [place] 2004 medtronic Claria MRI CRT-D bi V ICD. V pacing 91% 1/15/2020 device function nml  Chronic systolic CHF:   ECHO : AVR nml function, Mild MR, TR, LVEF 30-35% , RV function mod reduced. 19 RHC: RA 11/8, RV 30/8, PA, PCW 15 CI 1.48    Review of Systems: No nausea, indigestion, vomiting, pain, cough, sputum. No bleeding. NPO for cath.            Objective:     Visit Vitals  BP 98/67   Pulse 100   Temp 97.8 °F (36.6 °C)   Resp 18   Ht 6' (1.829 m)   Wt 206 lb 2.1 oz (93.5 kg)   SpO2 97%   BMI 27.96 kg/m²    O2 Flow Rate (L/min): 2 l/min O2 Device: Room air    Temp (24hrs), Av.6 °F (36.4 °C), Min:97.1 °F (36.2 °C), Max:98.1 °F (36.7 °C)      No intake/output data recorded. 07/22 1901 - 07/24 0700  In: 80 [P.O.:540; I.V.:240]  Out: 150 [Urine:150]     TELE: paced, PVC's. General: AAOx3 cooperative, no acute distress. HEENT: Atraumatic. Pink and moist.  Anicteric sclerae. Neck : Supple  Lungs: CTA bilaterally. No wheezing/rhonchi/rales. Heart: Regular rhythm, 2/6 systolic murmur, + valve click . No JVD. No carotid bruits. Abdomen: Soft, non-distended, non-tender. + Bowel sounds. Extremities: Edema hands, R wrist soft no hematoma. No LE edema, no clubbing, no cyanosis. Neurologic: Grossly intact. Alert and oriented X 3. No acute neurological distress. Psych: Good insight. Not anxious or agitated. Care Plan discussed with:    Comments   Patient x    Family  x    RN x    Care Manager                    Consultant:  x        Data Review:     No lab exists for component: ITNL   No results for input(s): CPK, CKMB, TROIQ in the last 72 hours.   Recent Labs     07/24/20 0131 07/23/20 0414 07/22/20  0426    140 143   K 4.4 4.2 4.2   * 112* 115*   CO2 19* 20* 18*   BUN 17 19 19   CREA 1.21 1.14 1.11    97 95   WBC 7.9 6.4 6.6   HGB 13.2 12.3 12.6   HCT 41.4 38.6 39.7    182 169     Recent Labs     07/24/20 0131 07/23/20 0414 07/22/20  0426   INR 1.5* 1.6* 2.6*   PTP 15.0* 15.6* 25.0*       Medications reviewed  Current Facility-Administered Medications   Medication Dose Route Frequency    aspirin delayed-release tablet 81 mg  81 mg Oral QHS    pravastatin (PRAVACHOL) tablet 40 mg  40 mg Oral QHS    spironolactone (ALDACTONE) tablet 12.5 mg  12.5 mg Oral BID    amiodarone (CORDARONE) tablet 400 mg  400 mg Oral BID    enoxaparin (LOVENOX) injection 90 mg  1 mg/kg SubCUTAneous Q12H    sodium chloride (NS) flush 5-40 mL  5-40 mL IntraVENous Q8H    sodium chloride (NS) flush 5-40 mL  5-40 mL IntraVENous PRN    sodium chloride (NS) flush 5-40 mL  5-40 mL IntraVENous PRN    sodium chloride (NS) flush 5-40 mL  5-40 mL IntraVENous Q8H    acetaminophen (TYLENOL) tablet 650 mg  650 mg Oral Q4H PRN    naloxone (NARCAN) injection 0.4 mg  0.4 mg IntraVENous PRN    ondansetron (ZOFRAN) injection 4 mg  4 mg IntraVENous Q4H PRN    Warfarin pharmacy to dose   Other Rx Dosing/Monitoring         Jackson Goss NP

## 2020-07-24 NOTE — ROUTINE PROCESS
Hospital follow-up PCP transitional care appointment has been scheduled with Dr. Herman Metzger for Thursday, July 30th at 2:30PM.  Pending patient discharge.   Hugh Araiza, Care Management Specialist

## 2020-07-24 NOTE — PROGRESS NOTES
7/24/2020  Case Management Progress Note    10:10 AM  Chart reviewed--69 Year old male admitted 7/19 with hypotension, tachycardia, tachy donal syndrome. Patient's RUR is 13% making him a low risk for readmission. No patient contact at this time. Noted that cardiology will be signing off on this patient today after unremarkable cath yesterday. Patient has consults for PT and OT to see, which will help determine discharge needs. Will continue to follow this patient today for discharge needs    Transition of Care Plan  1. Patient to be cleared by cardiology  2. Likely discharge home with wife   3. PT/OT consults today to determine any skilled discharge needs  4. Patient will need to follow up with PCP, specialties as necessary   5.  CM will continue to follow and assist with discharge planning    VISHAL Iyer

## 2020-07-24 NOTE — PROGRESS NOTES
PHYSICAL THERAPY EVALUATION/DISCHARGE  Patient: Chela Whyte (40 y.o. male)  Date: 7/24/2020  Primary Diagnosis: Hypotension [I95.9]  Tachycardia [R00.0]  Tachy-donal syndrome (HCC) [I49.5]  Tachy-donal syndrome ([facility] [I49.5]  Procedure(s) (LRB):  CORONARY ANGIOGRAPHY (N/A) 1 Day Post-Op   Precautions:          ASSESSMENT  Based on the objective data described below, the patient presents with stable vitals with activity, mild GONZALES, and reported baseline functional status. Gait training completed x160' with  and occasional path deviations the improved with distance. No LOB and good allyson. His HR remained between 100-110 BPM both resting and during gait. He reported mild GONZALES but no worse than his reported baseline and SpO2 stable on RA. He lives with his supportive wife and no discharge needs identified. He is safe for ambulation with family or staff on the floor. Further skilled acute physical therapy is not indicated at this time. PLAN :  Recommendation for discharge: (in order for the patient to meet his/her long term goals)  No skilled physical therapy/ follow up rehabilitation needs identified at this time. This discharge recommendation:  Has not yet been discussed the attending provider and/or case management    IF patient discharges home will need the following DME: none       SUBJECTIVE:   Patient stated I think this is a enough to convince me that I can walk.     OBJECTIVE DATA SUMMARY:   HISTORY:    Past Medical History:   Diagnosis Date    Anticoagulated on Coumadin 11/27/2017    Arthritis     Atrial fibrillation (HCC)     PAF    Cardiomyopathy ([facility]     GI bleed 02/2017    HTN (hypertension)     Hypothyroidism     Melanoma in situ ([facility]     Orthostatic hypotension 11/29/2017    Pacemaker 11/28/2017    Medtronic BiV ICD    S/P AVR (aortic valve replacement)     mechanical AVR    Syncope 4493    Systolic heart failure (HCC)     TIA (transient ischemic attack) 2017     Past Surgical History:   Procedure Laterality Date    HX AORTIC VALVE REPLACEMENT  2003    mechanical aortic valve     HX KNEE ARTHROSCOPY Right 1980's    HX MOHS PROCEDURES Left 2005    Cheek and Sabianist    HX PACEMAKER  2011    with defibulator    HX TONSILLECTOMY  1952    MT ICAR CATHETER ABLATION ATRIOVENTR NODE FUNCTION N/A 7/20/2020    ABLATION AV NODE performed by Yuli Ackerman MD at [address] CATH LAB    MT INTRACARDIAC ELECTROPHYSIOLOGIC 3D MAPPING N/A 7/20/2020    Ep 3d Mapping performed by Yuli Ackerman MD at [address] [facility] CATH LAB       Prior level of function:   Personal factors and/or comorbidities impacting plan of care:     Home Situation  Home Environment: Private residence  # Steps to Enter: 3  Rails to Enter: Yes  One/Two Story Residence: One story  Living Alone: No  Support Systems: Spouse/Significant Other/Partner  Current DME Used/Available at Home: Shower chair, Grab bars, Walker, rollator  Tub or Shower Type: Shower    EXAMINATION/PRESENTATION/DECISION MAKING:   Critical Behavior:  Neurologic State: Alert  Orientation Level: Oriented X4  Cognition: Appropriate decision making, Appropriate safety awareness     Hearing: Auditory  Auditory Impairment: None  Skin:    Edema:   Range Of Motion:  AROM: Within functional limits                       Strength:    Strength:  Within functional limits                    Tone & Sensation:   Tone: Normal              Sensation: Intact               Coordination:  Coordination: Within functional limits  Vision:      Functional Mobility:  Bed Mobility:              Transfers:  Sit to Stand: Modified independent  Stand to Sit: Modified independent                       Balance:   Sitting: Intact  Standing: Intact  Ambulation/Gait Training:  Distance (ft): 160 Feet (ft)  Assistive Device: Gait belt  Ambulation - Level of Assistance: Supervision     Gait Description (WDL): Exceptions to WDL  Gait Abnormalities: Decreased step clearance;Trunk sway increased              Speed/Krysta: Fluctuations       Physical Therapy Evaluation Charge Determination   History Examination Presentation Decision-Making   MEDIUM  Complexity : 1-2 comorbidities / personal factors will impact the outcome/ POC  LOW Complexity : 1-2 Standardized tests and measures addressing body structure, function, activity limitation and / or participation in recreation  LOW Complexity : Stable, uncomplicated  LOW Complexity : FOTO score of       Based on the above components, the patient evaluation is determined to be of the following complexity level: LOW     Pain Rating:      Activity Tolerance:   Good  Please refer to the flowsheet for vital signs taken during this treatment. After treatment patient left in no apparent distress:   Sitting in chair and Call bell within reach    COMMUNICATION/EDUCATION:   The patients plan of care was discussed with: Registered nurse. Fall prevention education was provided and the patient/caregiver indicated understanding., Patient/family have participated as able in goal setting and plan of care. and Patient/family agree to work toward stated goals and plan of care.     Thank you for this referral.  Amita Nieto, PT, DPT   Time Calculation: 30 mins

## 2020-07-24 NOTE — PROGRESS NOTES
0715-Bedside and Verbal shift change report given to Damon Chi RN and Christina Soliman RN (oncoming nurse) by Gilberto Klein RN (offgoing nurse). Report included the following information SBAR, Kardex, ED Summary, OR Summary, Procedure Summary, Intake/Output, MAR, Med Rec Status and Cardiac Rhythm Paced. 3771-I have reviewed discharge instructions with the patient and spouse. The patient and spouse verbalized understanding.

## 2020-07-24 NOTE — DISCHARGE SUMMARY
Discharge Summary       PATIENT ID: Jimmy Rosenberg  MRN: [id number]   YOB: 1945    DATE OF ADMISSION: 7/19/2020  4:50 PM    DATE OF DISCHARGE: 07/24/20   PRIMARY CARE PROVIDER: Eliane Gu MD     DISCHARGING PROVIDER: Jt Joshua MD      CONSULTATIONS: IP CONSULT TO CARDIOLOGY    PROCEDURES/SURGERIES: Procedure(s):  CORONARY ANGIOGRAPHY    ADMITTING [address] [place] [address] COURSE:   72-year-old male presented to the emergency room with complaints of racing heart. He was noted to have atrial fibrillation and hypotension. He was initially started on a Cardizem drip however this was discontinued due to it being ineffective and he was started on esmolol drip however he became hypotensive with this. He underwent an AV carlos ablation which initially was successful however he reverted back into a rapid heart rate which was thought to be slow V. tach. He underwent a second AV carlos ablation which was unsuccessful. He was started on IV amiodarone. A cardiac catheterization was unrevealing. He was transitioned to oral amiodarone and his heart rate remained in the 110s. He will need to continue amiodarone at home and follow-up with Dr. Real Giang and Dr. Ana Maria Horowitz at [address] [place] [address]. His Entresto was held due to low blood pressure. He will need to bridge Lovenox and resume Coumadin since this was held prior to his cath. DISCHARGE DIAGNOSES / PLAN:      1. Slow ventricular tachycardia  2. Hypotension  3. Aortic valve replacement  4. Systolic CHF, chronic  5. Elevated creatinine     ADDITIONAL CARE RECOMMENDATIONS:   Continue taking amiodarone twice daily through Monday and then take it once daily. Continue taking your usual dose of warfarin and continue with Lovenox injections twice a day until your INR is between 2.5-3.5. You will need to follow-up closely with the Coumadin clinic at [address] [place] [address] since amiodarone can make your warfarin levels increase quickly. Stop taking Lovenox once you are INR is above 2.5. Quincy Lambert has been placed on hold for now due to low blood pressure. Discussed with Dr. Leslee Preciado regarding further recommendations. PENDING TEST RESULTS:   At the time of discharge the following test results are still pending: None    FOLLOW UP APPOINTMENTS:    Follow-up Information     Follow up With Specialties Details Why Contact Info    Rachid Bass MD Cardiology In 4 weeks office will call with date/time of appt  [address] [facility]      Adan Snider MD Cardiology In 2 weeks  [address] [facility] [address] [facility]      Colin Lee MD Internal Medicine In 2 weeks  [address] [facility] [phone]      Nav Aldridge MD Family Medicine In 1 week  [address] [facility] [address]  [address] [facility]  [address] [facility]  [phone]               DIET: Cardiac    ACTIVITY: As tolerated      DISCHARGE MEDICATIONS:  Current Discharge Medication List      START taking these medications    Details   amiodarone (PACERONE) 400 mg tablet Take 1 Tab by mouth two (2) times a day for 4 days, THEN 1 Tab daily for 30 days. Qty: 38 Tab, Refills: 0      enoxaparin (LOVENOX) 100 mg/mL 90 mg by SubCUTAneous route every twelve (12) hours. Qty: 14 mL, Refills: 0         CONTINUE these medications which have NOT CHANGED    Details   !! warfarin (COUMADIN) 2.5 mg tablet Take 2.5 mg by mouth six (6) days a week. The patient takes 2.5 mg in the evening on Monday, Wednesday, Thursday, Friday, Saturday, Sunday      !! warfarin (COUMADIN) 2.5 mg tablet Take 3.75 mg by mouth every Tuesday. spironolactone (ALDACTONE) 25 mg tablet Take 12.5 mg by mouth two (2) times a day. Take 1/2 tablet two times daily      levothyroxine (SYNTHROID) 25 mcg tablet Take 25 mcg by mouth Daily (before breakfast). aspirin delayed-release 81 mg tablet Take 81 mg by mouth nightly. pravastatin (PRAVACHOL) 40 mg tablet Take 1 Tab by mouth nightly.   Qty: 90 Tab, Refills: 1    Associated Diagnoses: Transient cerebral ischemia, unspecified type      cholecalciferol, vitamin D3, (Vitamin D3) 50 mcg (2,000 unit) tab Take 2,000 Units by mouth daily. !! - Potential duplicate medications found. Please discuss with provider. STOP taking these medications       sacubitril-valsartan (ENTRESTO) 49 mg/51 mg tablet Comments:   Reason for Stopping:                 NOTIFY YOUR PHYSICIAN FOR ANY OF THE FOLLOWING:   Fever over 101 degrees for 24 hours. Chest pain, shortness of breath, fever, chills, nausea, vomiting, diarrhea, change in mentation, falling, weakness, bleeding. Severe pain or pain not relieved by medications. Or, any other signs or symptoms that you may have questions about. DISPOSITION:  x  Home With:   OT  PT  HH  RN       Long term SNF/Inpatient Rehab    Independent/assisted living    Hospice    Other:       PATIENT CONDITION AT DISCHARGE:     Functional status    Poor     Deconditioned    x Independent      Cognition    x Lucid     Forgetful     Dementia      Catheters/lines (plus indication)    Gillespie     PICC     PEG    x None      Code status   x  Full code     DNR      PHYSICAL EXAMINATION AT DISCHARGE:  General:          Alert, cooperative, no distress, appears stated age. HEENT:           Atraumatic, anicteric sclerae, pink conjunctivae                          No oral ulcers, mucosa moist, throat clear, dentition fair  Neck:               Supple, symmetrical  Lungs:             Clear to auscultation bilaterally. No Wheezing or Rhonchi. No rales. Heart:              Regular  rhythm,  No  murmur   No edema  Abdomen:        Soft, non-tender. Not distended. Bowel sounds normal  Extremities:     No cyanosis. No clubbing,                            Skin turgor normal, Capillary refill normal  Skin:                Not pale. Not Jaundiced  No rashes   Psych:             Not anxious or agitated.   Neurologic:      Alert, moves all extremities, answers questions appropriately and responds to commands       CHRONIC MEDICAL DIAGNOSES:  Problem List as of 7/24/2020 Date Reviewed: 7/19/2020          Codes Class Noted - Resolved    Tachycardia ICD-10-CM: R00.0  ICD-9-CM: 785.0  7/20/2020 - Present        Tachy-donal syndrome ([facility] ICD-10-CM: I49.5  ICD-9-CM: 427.81  7/20/2020 - Present        * (Principal) Hypotension ICD-10-CM: I95.9  ICD-9-CM: 458.9  7/19/2020 - Present        H/O aortic valve replacement ICD-10-CM: Z95.2  ICD-9-CM: V43.3  12/4/2017 - Present    Overview Signed 12/4/2017  3:19 PM by Kaye Reich MD     2003 @ [facility]              CVA (cerebral vascular accident) [facility]) ICD-10-CM: I63.9  ICD-9-CM: 434.91  12/1/2017 - Present        Orthostatic hypotension ICD-10-CM: I95.1  ICD-9-CM: 458.0  11/29/2017 - Present        Pacemaker (Chronic) ICD-10-CM: Z95.0  ICD-9-CM: V45.01  11/28/2017 - Present    Overview Signed 11/28/2017 10:55 AM by Rolando Trevino     -- unsure if MRI compatible             TIA (transient ischemic attack) ICD-10-CM: G45.9  ICD-9-CM: 435.9  11/27/2017 - Present        CHF (congestive heart failure) ([facility] (Chronic) ICD-10-CM: I50.9  ICD-9-CM: 428.0  11/27/2017 - Present        HTN (hypertension) (Chronic) ICD-10-CM: I10  ICD-9-CM: 401.9  11/27/2017 - Present        Elevated serum creatinine ICD-10-CM: R79.89  ICD-9-CM: 790.99  11/27/2017 - Present        Chronic anticoagulation (Chronic) ICD-10-CM: Z79.01  ICD-9-CM: V58.61  11/27/2017 - Present        RESOLVED: Dehydration ICD-10-CM: E86.0  ICD-9-CM: 276.51  2/2/2020 - 7/19/2020              Greater than 15 minutes were spent with the patient on counseling and coordination of care    Signed:   Dimple Garcia MD  7/24/2020  3:12 PM

## 2020-07-24 NOTE — PROGRESS NOTES
7/24/2020  Case Management Progress Note    3:16 PM  Noted discharge order for this patient. PT has cleared him, 2nd IM letter signed and placed on chart. VISHAL Baumann    10:10 AM  Chart reviewed--69 Year old male admitted 7/19 with hypotension, tachycardia, tachy donal syndrome. Patient's RUR is 13% making him a low risk for readmission. No patient contact at this time. Noted that cardiology will be signing off on this patient today after unremarkable cath yesterday. Patient has consults for PT and OT to see, which will help determine discharge needs. Will continue to follow this patient today for discharge needs    Transition of Care Plan  1. Patient to be cleared by cardiology  2. Likely discharge home with wife   3. PT/OT consults today to determine any skilled discharge needs  4. Patient will need to follow up with PCP, specialties as necessary   5.  CM will continue to follow and assist with discharge planning    VISHAL Baumann

## 2020-07-24 NOTE — PROGRESS NOTES
Problem: Falls - Risk of  Goal: *Absence of Falls  Description: Document Mary Ann Mc Fall Risk and appropriate interventions in the flowsheet.   Outcome: Progressing Towards Goal  Note: Fall Risk Interventions:  Mobility Interventions: Assess mobility with egress test, Bed/chair exit alarm         Medication Interventions: Bed/chair exit alarm, Patient to call before getting OOB, Teach patient to arise slowly    Elimination Interventions: Bed/chair exit alarm, Call light in reach, Patient to call for help with toileting needs, Toilet paper/wipes in reach, Urinal in reach              Problem: Patient Education: Go to Patient Education Activity  Goal: Patient/Family Education  Outcome: Progressing Towards Goal

## 2020-07-26 ENCOUNTER — DOCUMENTATION ONLY (OUTPATIENT)
Dept: CARDIOLOGY CLINIC | Age: 75
End: 2020-07-26

## 2020-07-26 NOTE — PROGRESS NOTES
Cardiology telephone call    He is developed dry heaving to the amiodarone at 400 mg twice daily. He is hesitant to change the medicine. He knows he is to see Susanna this coming week at [facility]. I told him just to take 400 mg today and he can take it this morning because he is throwing up. I clearly asked his wife is very other etiology for this possibly GI bug. She is adamant that it is not and he has no fevers.       Frank Guzman MD

## 2020-07-27 ENCOUNTER — PATIENT OUTREACH (OUTPATIENT)
Dept: FAMILY MEDICINE CLINIC | Age: 75
End: 2020-07-27

## 2020-07-27 NOTE — PROGRESS NOTES
Patient contacted regarding recent discharge and COVID-19 risk. Discussed COVID-19 related testing which was not done at this time. Test results were not done. Patient informed of results, if available? n/a    Care Transition Nurse/ Ambulatory Care Manager contacted the family by telephone to perform post discharge assessment. Verified name and  with family as identifiers. Patient has following risk factors of: heart failure. CTN/ACM reviewed discharge instructions, medical action plan and red flags related to discharge diagnosis. Reviewed and educated them on any new and changed medications related to discharge diagnosis. Advised obtaining a 90-day supply of all daily and as-needed medications. Advance Care Planning:   Does patient have an Advance Directive: yes, reviewed and current     Education provided regarding infection prevention, and signs and symptoms of COVID-19 and when to seek medical attention with family who verbalized understanding. Discussed exposure protocols and quarantine from [address] you at higher risk for severe illness  and given an opportunity for questions and concerns. The family agrees to contact the COVID-19 hotline [phone] or PCP office for questions related to their healthcare. CTN/ACM provided contact information for future reference. From CDC: Are you at higher risk for severe illness?  Wash your hands often.  Avoid close contact (6 feet, which is about two arm lengths) with people who are sick.  Put distance between yourself and other people if COVID-19 is spreading in your community.  Clean and disinfect frequently touched surfaces.  Avoid all cruise travel and non-essential air travel.  Call your healthcare professional if you have concerns about COVID-19 and your underlying condition or if you are sick.     For more information on steps you can take to protect yourself, see CDC's How to Protect Yourself Patient/family/caregiver given information for [employer] and agrees to enroll no  Patient's preferred e-mail:  n/a  Patient's preferred phone number: n/a  Based on Loop alert triggers, patient will be contacted by nurse care manager for worsening symptoms. Plan for follow-up call in 7-14 days based on severity of symptoms and risk factors.

## 2020-08-11 NOTE — PROGRESS NOTES
HISTORY OF PRESENTING ILLNESS      Livia Holder is a 69 y.o. male with cardiomyopathy (nonischemic?), CRTD, mechanical AVR, TIA presenting with palpitations/fatigue over the past few days. He was found to be in atrial fibrillation with RVR. ICD interrogation demonstrated AF dating back to 2019. Recent atrial arrhythmia events were not available for review via ICD interrogation due to current atrial lead sensitivity settings. Recent CRT percentage was < 90%. Ventricular rates have been in the 130-150 range; cardizem drip was poorly tolerated hemodynamically; esmolol drip was initiated however also failed to improve rate control and was associated with hypotension as well (SBP 67 mm Hg). IV fluids were given and improved SBP to the 80-90 mm Hg range. He had successful AV node ablation on 7/20/2020 but unfortunately recovered AV carlos function overnight. He failed recurrent ABELARDO/DCCV; however telemetry review revealed a slow VT as the underlying rhythm. He underwent cardiac catheterization which was negative for CAD and was discharged with Amiodarone and OP follow up planned at [facility]. He has seen Dr. Juan Pablo Lamar for VT ablation referral and is planned for EPS on 9/2/2020. His Amiodarone was discontinued d/t side effects. EKG shows ventricular pacing at 100 bpm and he has had fluid retention (approximately 20lbs since discharge), Optivol shows fluid accumulation as well. He reports sob with exertion and BLE edema that has improved with higher dose of spironolactone but not resolved.         PAST MEDICAL HISTORY     Past Medical History:   Diagnosis Date    Anticoagulated on Coumadin 11/27/2017    Arthritis     Atrial fibrillation ([facility]     PAF    Cardiomyopathy ([facility]     GI bleed 02/2017    HTN (hypertension)     Hypothyroidism     Melanoma in situ ([facility]     Orthostatic hypotension 11/29/2017    Pacemaker 11/28/2017    Medtronic BiV ICD    S/P AVR (aortic valve replacement)     mechanical AVR  Syncope 1910    Systolic heart failure ([facility] [facility]     TIA (transient ischemic attack) 2017           PAST SURGICAL HISTORY     Past Surgical History:   Procedure Laterality Date    HX AORTIC VALVE REPLACEMENT  2003    mechanical aortic valve     HX KNEE ARTHROSCOPY Right 1980's    HX MOHS PROCEDURES Left 2005    Cheek and Yazdanism    HX PACEMAKER  2011    with defibulator    HX TONSILLECTOMY  1952    ME ICAR CATHETER ABLATION ATRIOVENTR NODE FUNCTION N/A 7/20/2020    ABLATION AV NODE performed by Rubio Maldonado MD at [address] CATH LAB    ME INTRACARDIAC ELECTROPHYSIOLOGIC 3D MAPPING N/A 7/20/2020    Ep 3d Mapping performed by Rubio Maldonado MD at [address] CATH LAB          ALLERGIES     No Known Allergies       FAMILY HISTORY     Family History   Problem Relation Age of Onset    Cancer Father     negative for cardiac disease       SOCIAL HISTORY     Social History     Socioeconomic History    Marital status:      Spouse name: Not on file    Number of children: Not on file    Years of education: Not on file    Highest education level: Not on file   Tobacco Use    Smoking status: Never Smoker    Smokeless tobacco: Never Used   Substance and Sexual Activity    Alcohol use: Not Currently    Drug use: No    Sexual activity: Yes         MEDICATIONS     Current Outpatient Medications   Medication Sig    sacubitriL-valsartan (Entresto) 49-51 mg tab tablet Take 1 Tab by mouth two (2) times a day.  warfarin (COUMADIN) 2.5 mg tablet Take 2.5 mg by mouth six (6) days a week. The patient takes 2.5 mg in the evening on Monday, Wednesday, Thursday, Friday, Saturday, Sunday    warfarin (COUMADIN) 2.5 mg tablet Take 3.75 mg by mouth every Tuesday.  spironolactone (ALDACTONE) 25 mg tablet Take 12.5 mg by mouth two (2) times a day. Take 1/2 tablet two times daily    levothyroxine (SYNTHROID) 25 mcg tablet Take 25 mcg by mouth Daily (before breakfast).     aspirin delayed-release 81 mg tablet Take 81 mg by mouth nightly.  pravastatin (PRAVACHOL) 40 mg tablet Take 1 Tab by mouth nightly.  cholecalciferol, vitamin D3, (Vitamin D3) 50 mcg (2,000 unit) tab Take 2,000 Units by mouth daily. No current facility-administered medications for this visit. I have reviewed the nurses notes, vitals, problem list, allergy list, medical history, family, social history and medications. REVIEW OF SYMPTOMS      General: Pt denies excessive weight gain or loss. Pt is able to conduct ADL's  HEENT: Denies blurred vision, headaches, hearing loss, epistaxis and difficulty swallowing. Respiratory: Denies cough, congestion, shortness of breath, GONZALES, wheezing or stridor. Cardiovascular: Denies precordial pain, palpitations, edema or PND  Gastrointestinal: Denies poor appetite, indigestion, abdominal pain or blood in stool  Genitourinary: Denies hematuria, dysuria, increased urinary frequency  Musculoskeletal: Denies joint pain or swelling from muscles or joints  Neurologic: Denies tremor, paresthesias, headache, or sensory motor disturbance  Psychiatric: Denies confusion, insomnia, depression  Integumentray: Denies rash, itching or ulcers. Hematologic: Denies easy bruising, bleeding       PHYSICAL EXAMINATION      Vitals: see vitals section  General: Well developed, in no acute distress. HEENT: No jaundice, oral mucosa moist, no oral ulcers  Neck: Supple, no stiffness, no lymphadenopathy, supple  Heart:  Normal S1/S2 negative S3 or S4. Regular, no murmur, gallop or rub, no jugular venous distention  Respiratory: Clear bilaterally x 4, no wheezing or rales  Abdomen:   Soft, non-tender, bowel sounds are active. Extremities:  No edema, normal cap refill, no cyanosis. Musculoskeletal: No clubbing, no deformities  Neuro: A&Ox3, speech clear, gait stable, cooperative, no focal neurologic deficits  Skin: Skin color is normal. No rashes or lesions.  Non diaphoretic, moist.  Vascular: 2+ pulses symmetric in all extremities       DIAGNOSTIC DATA      EKG:        LABORATORY DATA      Lab Results   Component Value Date/Time    WBC 7.9 07/24/2020 01:31 AM    HGB 13.2 07/24/2020 01:31 AM    HCT 41.4 07/24/2020 01:31 AM    PLATELET 191 61/24/7904 01:31 AM    MCV 93.5 07/24/2020 01:31 AM      Lab Results   Component Value Date/Time    Sodium 137 07/24/2020 01:31 AM    Potassium 4.4 07/24/2020 01:31 AM    Chloride 111 (H) 07/24/2020 01:31 AM    CO2 19 (L) 07/24/2020 01:31 AM    Anion gap 7 07/24/2020 01:31 AM    Glucose 100 07/24/2020 01:31 AM    BUN 17 07/24/2020 01:31 AM    Creatinine 1.21 07/24/2020 01:31 AM    BUN/Creatinine ratio 14 07/24/2020 01:31 AM    GFR est AA >60 07/24/2020 01:31 AM    GFR est non-AA 59 (L) 07/24/2020 01:31 AM    Calcium 8.6 07/24/2020 01:31 AM    Bilirubin, total 1.0 07/19/2020 05:05 PM    Alk. phosphatase 86 07/19/2020 05:05 PM    Protein, total 7.0 07/19/2020 05:05 PM    Albumin 3.9 07/19/2020 05:05 PM    Globulin 3.1 07/19/2020 05:05 PM    A-G Ratio 1.3 07/19/2020 05:05 PM    ALT (SGPT) 55 07/19/2020 05:05 PM           ASSESSMENT      1. Atrial fibrillation              A. Long-standing persistent              B. RVR  2. Aortic valve replacement              A. Mechanical              B. Coumadin  3. Hypotension  4. ICD              A. Medtronic              B. Left sided              C. CRTD  5. Hypertension  6. Cardiomyopathy              A. LVEF 40-45%  7. GI bleed history  8. Hypertension  9. Orthostatic hypotension   10. TIA       PLAN     Continue current regimen; planned for VT ablation in September. Continue to follow with Dr. Antonio Winters. ICD-10-CM ICD-9-CM    1. Tachy-donal syndrome (HCC)  I49.5 427.81 AMB POC EKG ROUTINE W/ 12 LEADS, INTER & REP   2. Hypertension, unspecified type  I10 401.9    3. Ventricular tachycardia (HCC)  I47.2 427.1    4. Congestive heart failure, unspecified HF chronicity, unspecified heart failure type ([facility])  I50.9 428.0    5.  Pacemaker  Z95.0 V45.01 Orders Placed This Encounter    AMB POC EKG ROUTINE W/ 12 LEADS, INTER & REP     Order Specific Question:   Reason for Exam:     Answer:   irregular heart beat    sacubitriL-valsartan (Entresto) 49-51 mg tab tablet     Sig: Take 1 Tab by mouth two (2) times a day. FOLLOW-UP     With Dr. Martha Berkowitz      Thank you, Eliane Gu MD and Dr. Martha Berkowitz for allowing me to participate in the care of this extraordinarily pleasant male. Please do not hesitate to contact me for further questions/concerns.          Kathryn Wright MD  Cardiac Electrophysiology / Cardiology    [address]  [address] [facility], [facility], [place] [address]  [address]    [address] [facility]  [phone] / [phone] Fax   [phone] / [phone] Fax

## 2020-08-12 ENCOUNTER — CLINICAL SUPPORT (OUTPATIENT)
Dept: CARDIOLOGY CLINIC | Age: 75
End: 2020-08-12
Payer: MEDICARE

## 2020-08-12 ENCOUNTER — OFFICE VISIT (OUTPATIENT)
Dept: CARDIOLOGY CLINIC | Age: 75
End: 2020-08-12

## 2020-08-12 VITALS
HEIGHT: 72 IN | DIASTOLIC BLOOD PRESSURE: 42 MMHG | BODY MASS INDEX: 27.3 KG/M2 | WEIGHT: 201.6 LBS | OXYGEN SATURATION: 98 % | RESPIRATION RATE: 16 BRPM | SYSTOLIC BLOOD PRESSURE: 110 MMHG | HEART RATE: 100 BPM

## 2020-08-12 DIAGNOSIS — Z95.810 AUTOMATIC IMPLANTABLE CARDIAC DEFIBRILLATOR IN SITU: Primary | ICD-10-CM

## 2020-08-12 DIAGNOSIS — I10 HYPERTENSION, UNSPECIFIED TYPE: ICD-10-CM

## 2020-08-12 DIAGNOSIS — Z95.0 PACEMAKER: ICD-10-CM

## 2020-08-12 DIAGNOSIS — I49.5 TACHY-BRADY SYNDROME (HCC): Primary | ICD-10-CM

## 2020-08-12 DIAGNOSIS — I50.9 CONGESTIVE HEART FAILURE, UNSPECIFIED HF CHRONICITY, UNSPECIFIED HEART FAILURE TYPE (HCC): ICD-10-CM

## 2020-08-12 DIAGNOSIS — I47.20 VENTRICULAR TACHYCARDIA: ICD-10-CM

## 2020-08-12 PROCEDURE — 93284 PRGRMG EVAL IMPLANTABLE DFB: CPT

## 2020-08-12 PROCEDURE — G8752 SYS BP LESS 140: HCPCS | Performed by: INTERNAL MEDICINE

## 2020-08-12 PROCEDURE — 99215 OFFICE O/P EST HI 40 MIN: CPT | Performed by: INTERNAL MEDICINE

## 2020-08-12 PROCEDURE — 1111F DSCHRG MED/CURRENT MED MERGE: CPT | Performed by: INTERNAL MEDICINE

## 2020-08-12 PROCEDURE — 3017F COLORECTAL CA SCREEN DOC REV: CPT | Performed by: INTERNAL MEDICINE

## 2020-08-12 PROCEDURE — G8427 DOCREV CUR MEDS BY ELIG CLIN: HCPCS | Performed by: INTERNAL MEDICINE

## 2020-08-12 PROCEDURE — G8419 CALC BMI OUT NRM PARAM NOF/U: HCPCS | Performed by: INTERNAL MEDICINE

## 2020-08-12 PROCEDURE — G8536 NO DOC ELDER MAL SCRN: HCPCS | Performed by: INTERNAL MEDICINE

## 2020-08-12 PROCEDURE — 93000 ELECTROCARDIOGRAM COMPLETE: CPT | Performed by: INTERNAL MEDICINE

## 2020-08-12 PROCEDURE — 1101F PT FALLS ASSESS-DOCD LE1/YR: CPT | Performed by: INTERNAL MEDICINE

## 2020-08-12 PROCEDURE — G8754 DIAS BP LESS 90: HCPCS | Performed by: INTERNAL MEDICINE

## 2020-08-12 PROCEDURE — G8432 DEP SCR NOT DOC, RNG: HCPCS | Performed by: INTERNAL MEDICINE

## 2020-08-12 RX ORDER — SACUBITRIL AND VALSARTAN 49; 51 MG/1; MG/1
1 TABLET, FILM COATED ORAL 2 TIMES DAILY
COMMUNITY

## 2020-08-12 NOTE — PROGRESS NOTES
ROOM # 2  [facility]: 7/19-7/24/20- low BP  Swelling lower legs and feet, SOB with least exertion  Visit Vitals  /42 (BP 1 Location: Left arm, BP Patient Position: Sitting)   Pulse 100   Resp 16   Ht 6' (1.829 m)   Wt 201 lb 9.6 oz (91.4 kg)   SpO2 98%   BMI 27.34 kg/m²

## 2020-08-14 ENCOUNTER — PATIENT OUTREACH (OUTPATIENT)
Dept: FAMILY MEDICINE CLINIC | Age: 75
End: 2020-08-14

## 2020-08-14 NOTE — PROGRESS NOTES
Patient resolved from Transition of Care episode on 8/14/20. ACM/CTN was unsuccessful at contacting this patient today. Patient/family was provided the following resources and education related to COVID-19 during the initial call:                         Signs, symptoms and red flags related to COVID-19            CDC exposure and quarantine guidelines            Conduit exposure contact - [phone]            Contact for their local Department of Health                 Patient has not had any additional ED or hospital visits. No further outreach scheduled with this CTN/ACM. Episode of Care resolved. Patient has this CTN/ACM contact information if future needs arise.

## 2020-12-21 ENCOUNTER — TRANSCRIBE ORDER (OUTPATIENT)
Dept: SCHEDULING | Age: 75
End: 2020-12-21

## 2020-12-21 DIAGNOSIS — N63.20 MASS OF LEFT BREAST: ICD-10-CM

## 2020-12-21 DIAGNOSIS — N63.10 MASS OF RIGHT BREAST: Primary | ICD-10-CM

## 2021-01-14 ENCOUNTER — HOSPITAL ENCOUNTER (OUTPATIENT)
Dept: MAMMOGRAPHY | Age: 76
Discharge: HOME | End: 2021-01-14
Attending: FAMILY MEDICINE
Payer: MEDICARE

## 2021-01-14 ENCOUNTER — APPOINTMENT (OUTPATIENT)
Dept: MAMMOGRAPHY | Age: 76
End: 2021-01-14
Attending: FAMILY MEDICINE
Payer: MEDICARE

## 2021-01-14 ENCOUNTER — TRANSCRIBE ORDER (OUTPATIENT)
Dept: MAMMOGRAPHY | Age: 76
End: 2021-01-14

## 2021-01-14 DIAGNOSIS — N63.12 MASS OF UPPER INNER QUADRANT OF RIGHT BREAST: ICD-10-CM

## 2021-01-14 DIAGNOSIS — N63.22 MASS OF UPPER INNER QUADRANT OF LEFT BREAST: ICD-10-CM

## 2021-01-14 DIAGNOSIS — N63.20 BILATERAL BREAST LUMP: Primary | ICD-10-CM

## 2021-01-14 DIAGNOSIS — N63.22 MASS OF UPPER INNER QUADRANT OF LEFT BREAST: Primary | ICD-10-CM

## 2021-01-14 DIAGNOSIS — N63.10 BILATERAL BREAST LUMP: Primary | ICD-10-CM

## 2021-01-14 PROCEDURE — 77066 DX MAMMO INCL CAD BI: CPT

## 2022-03-18 PROBLEM — Z95.0 PACEMAKER: Status: ACTIVE | Noted: 2017-11-28

## 2022-03-18 PROBLEM — I95.1 ORTHOSTATIC HYPOTENSION: Status: ACTIVE | Noted: 2017-11-29

## 2022-03-18 PROBLEM — I95.9 HYPOTENSION: Status: ACTIVE | Noted: 2020-07-19

## 2022-03-18 PROBLEM — I63.9 CVA (CEREBRAL VASCULAR ACCIDENT) (HCC): Status: ACTIVE | Noted: 2017-12-01

## 2022-03-18 PROBLEM — I10 HTN (HYPERTENSION): Status: ACTIVE | Noted: 2017-11-27

## 2022-03-18 PROBLEM — R00.0 TACHYCARDIA: Status: ACTIVE | Noted: 2020-07-20

## 2022-03-18 PROBLEM — Z79.01 CHRONIC ANTICOAGULATION: Status: ACTIVE | Noted: 2017-11-27

## 2022-03-18 PROBLEM — I50.9 CHF (CONGESTIVE HEART FAILURE) (HCC): Status: ACTIVE | Noted: 2017-11-27

## 2022-03-19 PROBLEM — I49.5 TACHY-BRADY SYNDROME (HCC): Status: ACTIVE | Noted: 2020-07-20

## 2022-03-19 PROBLEM — G45.9 TIA (TRANSIENT ISCHEMIC ATTACK): Status: ACTIVE | Noted: 2017-11-27

## 2022-03-19 PROBLEM — R79.89 ELEVATED SERUM CREATININE: Status: ACTIVE | Noted: 2017-11-27

## 2022-03-20 PROBLEM — Z95.2 H/O AORTIC VALVE REPLACEMENT: Status: ACTIVE | Noted: 2017-12-04

## 2022-05-13 ENCOUNTER — TRANSCRIBE ORDER (OUTPATIENT)
Dept: SCHEDULING | Age: 77
End: 2022-05-13

## 2022-05-13 DIAGNOSIS — Z85.89 PERSONAL HISTORY OF MALIGNANT NEOPLASM OF OTHER SITE: ICD-10-CM

## 2022-05-13 DIAGNOSIS — R63.4 LOSS OF WEIGHT: Primary | ICD-10-CM

## 2022-05-31 ENCOUNTER — TRANSCRIBE ORDER (OUTPATIENT)
Dept: SCHEDULING | Age: 77
End: 2022-05-31

## 2022-05-31 DIAGNOSIS — Z85.89 PERSONAL HISTORY OF MALIGNANT NEOPLASM OF OTHER SITE: ICD-10-CM

## 2022-05-31 DIAGNOSIS — R63.4 LOSS OF WEIGHT: Primary | ICD-10-CM

## 2022-09-30 NOTE — PROGRESS NOTES
11/28/2017   CARE MANAGEMENT NOTE:  CM is following pt in the ER for initial discharge planning. EMR reviewed. CM met with pt who was his own historian for this needs assessment. Reportedly, pt relocated from [place] to [place] two weeks ago. He lives with his wife in a two story home with master bedroom on the ground floor. PTA pt was ambulatory, indepn with ADLs, and drives. He is retired and has RX drug coverage. Pt uses mail order pharmacy. He does not have home healthcare nor DME for home use. PCP is in [place] - he has not established a physician locally yet. Plan is to return home. Await PT/OT/ST evaluations and any recommendations for home health or f/u. Pt is currently admitted under OBS status. CM provided written and oral explanation of Medicare Outpt OBS and State OBS letters. Signed copies will be scanned into pt's chart.   Cale Statement Selected

## 2022-10-25 NOTE — IP AVS SNAPSHOT
By the time I called this evening, they were closed and no representative available to talk to  Will call in the morning  Chyna Palacios 
 
 
 [address] [facility] 
[phone] Patient: Leila Cazares MRN: [id number] :1945 About your hospitalization You were admitted on:  2017 You last received care in the:  [facility]  MED SURG 2 You were discharged on:  2017 Why you were hospitalized Your primary diagnosis was:  Tia (Transient Ischemic Attack) Your diagnoses also included:  Chf (Congestive Heart Failure) (Hcc), Htn (Hypertension), Elevated Serum Creatinine, Chronic Anticoagulation, Pacemaker, Orthostatic Hypotension, Cva (Cerebral Vascular Accident) (Hcc) Things You Need To Do (next 8 weeks) Follow up with June Bosch MD  
  
Where:  Patient can only remember the practice name and not the physician Follow up with Cecelia Phone:  [phone] Where:  [address], [address] [facility] [address], [address] [facility] Monday Dec 04, 2017 ROUTINE CARE with Brenda Magallanes MD at  2:35 PM  
Where:  [address] [facility]) Wednesday Dec 13, 2017 Any with Umberto Hunter NP at  2:00 PM  
Where:  [facility] Discharge Orders Procedure Order Date Status Priority Quantity Spec Type Associated Dx CT HEAD WO CONT 17 1134 Future Routine 1 Schedule Instructions:  Call Scheduling office ASAP at: [phone], fax ([phone]) to schedule CT head for  or 17. You will likely need to FAX order to them and/or bring it with you. [id number] Questions: Is Patient Allergic to Contrast Dye?:  Unknown A check andres indicates which time of day the medication should be taken. My Medications STOP taking these medications DIOVAN 80 mg tablet Generic drug:  valsartan spironolactone 25 mg tablet Commonly known as:  ALDACTONE  
   
  
  
TAKE these medications as instructed Instructions Each Dose to Equal  
 Morning Noon Evening Bedtime  
 acetaminophen 325 mg tablet Commonly known as:  TYLENOL Your last dose was: Your next dose is: Take 325 mg by mouth every six (6) hours as needed for Pain. 325 mg  
    
   
   
   
  
 bisoprolol 5 mg tablet Commonly known as:  Key Finder Your last dose was: Your next dose is: Take 5 mg by mouth daily. 5 mg  
    
   
   
   
  
 pravastatin 40 mg tablet Commonly known as:  PRAVACHOL Your last dose was: Your next dose is: Take 40 mg by mouth nightly. 40 mg  
    
   
   
   
  
 torsemide 10 mg tablet Commonly known as:  DEMADEX Your last dose was: Your next dose is: Take 10 mg by mouth daily. 10 mg  
    
   
   
   
  
 VITAMIN D3 2,000 unit Tab Generic drug:  cholecalciferol (vitamin D3) Your last dose was: Your next dose is: Take 2,000 Units by mouth daily. 2000 Units * warfarin 2.5 mg tablet Commonly known as:  COUMADIN Your last dose was: Your next dose is: Take 2.5 mg by mouth every Tuesday, Thursday, Saturday & Sunday. 2.5 mg TuThSaSu and 1.25 mg MWF  
 2.5 mg  
    
   
   
   
  
 * warfarin 2.5 mg tablet Commonly known as:  COUMADIN Your last dose was: Your next dose is: Take 1.25 mg by mouth every Monday, Wednesday, Friday. 2.5 mg TuThSaSu and 1.25 mg MWF  
 1.25 mg  
    
   
   
   
  
 * Notice: This list has 2 medication(s) that are the same as other medications prescribed for you. Read the directions carefully, and ask your doctor or other care provider to review them with you. Discharge Instructions ACUTE DIAGNOSES: 
TIA (transient ischemic attack) CVA (cerebral vascular accident) ([facility] [address] CHRONIC MEDICAL DIAGNOSES: 
Problem List as of 12/1/2017  Date Reviewed: 12/1/2017 Codes Class Noted - Resolved CVA (cerebral vascular accident) [facility]) ICD-10-CM: I63.9 ICD-9-CM: 434.91  12/1/2017 - Present Orthostatic hypotension ICD-10-CM: I95.1 ICD-9-CM: 458.0  11/29/2017 - Present Pacemaker (Chronic) ICD-10-CM: Z95.0 ICD-9-CM: V45.01  11/28/2017 - Present Overview Signed 11/28/2017 10:55 AM by Neymar Saxena NP  
  -- unsure if MRI compatible * (Principal)TIA (transient ischemic attack) ICD-10-CM: G45.9 ICD-9-CM: 435.9  11/27/2017 - Present CHF (congestive heart failure) (HCC) (Chronic) ICD-10-CM: I50.9 ICD-9-CM: 428.0  11/27/2017 - Present HTN (hypertension) (Chronic) ICD-10-CM: I10 
ICD-9-CM: 401.9  11/27/2017 - Present Elevated serum creatinine ICD-10-CM: R79.89 ICD-9-CM: 790.99  11/27/2017 - Present Chronic anticoagulation (Chronic) ICD-10-CM: Z79.01 
ICD-9-CM: V58.61  11/27/2017 - Present DISCHARGE MEDICATIONS:  
  
 
 
· It is important that you take the medication exactly as they are prescribed. · Keep your medication in the bottles provided by the pharmacist and keep a list of the medication names, dosages, and times to be taken in your wallet. · Do not take other medications without consulting your doctor. DIET:  Cardiac Diet ACTIVITY: Activity as tolerated ADDITIONAL INFORMATION: If you experience any of the following symptoms then please call your primary care physician or return to the emergency room if you cannot get hold of your doctor: Fever, chills, nausea, vomiting, diarrhea, change in mentation, falling, bleeding, shortness of breath. FOLLOW UP CARE: 
Primary care physician. you are to call and set up an appointment to see them in 2 weeks. Follow-up with neurology in 4 weeks Follow-up with cardiology in 3 weeks Information obtained by : 
I understand that if any problems occur once I am at home I am to contact my physician. I understand and acknowledge receipt of the instructions indicated above. Physician's or R.N.'s Signature                                                                  Date/Time Patient or Representative Signature                                                          Date/Time Factyle Announcement We are excited to announce that we are making your provider's discharge notes available to you in Factyle. You will see these notes when they are completed and signed by the physician that discharged you from your recent hospital stay. If you have any questions or concerns about any information you see in Factyle, please call the Health Information Department where you were seen or reach out to your Primary Care Provider for more information about your plan of care. Introducing [facility] SERVICES! [facility] introduces Factyle patient portal. Now you can access parts of your medical record, email your doctor's office, and request medication refills online. 1. In your internet browser, go to [email or web address] 2. Click on the First Time User? Click Here link in the Sign In box. You will see the New Member Sign Up page. 3. Enter your Factyle Access Code exactly as it appears below. You will not need to use this code after youve completed the sign-up process. If you do not sign up before the expiration date, you must request a new code. · Factyle Access Code: [email or web address] Expires: 2/27/2018 11:29 AM 
 
 4. Enter the last four digits of your Social Security Number (xxxx) and Date of Birth (mm/dd/yyyy) as indicated and click Submit. You will be taken to the next sign-up page. 5. Create a ROCKETHOME ID. This will be your ROCKETHOME login ID and cannot be changed, so think of one that is secure and easy to remember. 6. Create a ROCKETHOME password. You can change your password at any time. 7. Enter your Password Reset Question and Answer. This can be used at a later time if you forget your password. 8. Enter your e-mail address. You will receive e-mail notification when new information is available in [address] 9. Click Sign Up. You can now view and download portions of your medical record. 10. Click the Download Summary menu link to download a portable copy of your medical information. If you have questions, please visit the Frequently Asked Questions section of the ROCK[employer] website. Remember, ROCKETHOME is NOT to be used for urgent needs. For medical emergencies, dial 911. Now available from your iPhone and Android! Providers Seen During Your Hospitalization Provider Specialty Primary office phone Hilda Manning MD Emergency Medicine [phone] Joseph Krabbe, MD Hospitalist [phone] Immunizations Administered for This Admission Name Date Influenza Vaccine (Quad) PF 12/1/2017 Your Primary Care Physician (PCP) Primary Care Physician Office Phone Office Fax OTHER, PHYS ** None ** ** None ** You are allergic to the following No active allergies Recent Documentation Height Weight BMI Smoking Status 1.854 m 88.2 kg 25.65 kg/m2 Never Smoker Emergency Contacts Name Discharge Info Relation Home Work Mobile Dorsi Gary N/A  AT THIS TIME [6] Daughter [21] [phone] Almita Cuellar DISCHARGE CAREGIVER [3] Spouse [3] [phone] Patient Belongings The following personal items are in your possession at time of discharge: 
  Dental Appliances: Uppers  Visual Aid: Glasses, At bedside      Home Medications: None   Jewelry: Necklace  Clothing: None    Other Valuables: Eyeglasses Please provide this summary of care documentation to your next provider. Signatures-by signing, you are acknowledging that this After Visit Summary has been reviewed with you and you have received a copy. Patient Signature:  ____________________________________________________________ Date:  ____________________________________________________________  
  
Filiberto Estrellita Provider Signature:  ____________________________________________________________ Date:  ____________________________________________________________

## 2023-04-23 DIAGNOSIS — R63.4 LOSS OF WEIGHT: Primary | ICD-10-CM

## 2023-08-22 NOTE — ED NOTES
Family member at bedside.  Pt standing beside the bed to urinate in urinal. Bi-Rhombic Flap Text: The defect edges were debeveled with a #15 scalpel blade.  Given the location of the defect and the proximity to free margins a bi-rhombic flap was deemed most appropriate.  Using a sterile surgical marker, an appropriate rhombic flap was drawn incorporating the defect. The area thus outlined was incised deep to adipose tissue with a #15 scalpel blade.  The skin margins were undermined to an appropriate distance in all directions utilizing iris scissors.

## 2024-02-20 ENCOUNTER — HOSPITAL ENCOUNTER (EMERGENCY)
Facility: HOSPITAL | Age: 79
Discharge: HOME | End: 2024-02-20
Attending: EMERGENCY MEDICINE
Payer: MEDICARE

## 2024-02-20 ENCOUNTER — APPOINTMENT (OUTPATIENT)
Facility: HOSPITAL | Age: 79
End: 2024-02-20
Payer: MEDICARE

## 2024-02-20 VITALS
HEART RATE: 94 BPM | BODY MASS INDEX: 24.5 KG/M2 | RESPIRATION RATE: 16 BRPM | TEMPERATURE: 97.8 F | SYSTOLIC BLOOD PRESSURE: 107 MMHG | WEIGHT: 175 LBS | OXYGEN SATURATION: 99 % | HEIGHT: 71 IN | DIASTOLIC BLOOD PRESSURE: 62 MMHG

## 2024-02-20 DIAGNOSIS — S20.211A CONTUSION OF RIGHT CHEST WALL, INITIAL ENCOUNTER: Primary | ICD-10-CM

## 2024-02-20 LAB
ALBUMIN SERPL-MCNC: 3 G/DL (ref 3.5–5)
ALBUMIN/GLOB SERPL: 1 (ref 1.1–2.2)
ALP SERPL-CCNC: 127 U/L (ref 45–117)
ALT SERPL-CCNC: 21 U/L (ref 12–78)
ANION GAP SERPL CALC-SCNC: 6 MMOL/L (ref 5–15)
AST SERPL-CCNC: 27 U/L (ref 15–37)
BASOPHILS # BLD: 0.1 K/UL (ref 0–0.1)
BASOPHILS NFR BLD: 1 % (ref 0–1)
BILIRUB SERPL-MCNC: 1.3 MG/DL (ref 0.2–1)
BUN SERPL-MCNC: 24 MG/DL (ref 6–20)
BUN/CREAT SERPL: 21 (ref 12–20)
CALCIUM SERPL-MCNC: 8.8 MG/DL (ref 8.5–10.1)
CHLORIDE SERPL-SCNC: 106 MMOL/L (ref 97–108)
CO2 SERPL-SCNC: 27 MMOL/L (ref 21–32)
CREAT SERPL-MCNC: 1.15 MG/DL (ref 0.7–1.3)
DIFFERENTIAL METHOD BLD: ABNORMAL
EOSINOPHIL # BLD: 0.1 K/UL (ref 0–0.4)
EOSINOPHIL NFR BLD: 2 % (ref 0–7)
ERYTHROCYTE [DISTWIDTH] IN BLOOD BY AUTOMATED COUNT: 15.9 % (ref 11.5–14.5)
GLOBULIN SER CALC-MCNC: 2.9 G/DL (ref 2–4)
GLUCOSE SERPL-MCNC: 104 MG/DL (ref 65–100)
HCT VFR BLD AUTO: 45.4 % (ref 36.6–50.3)
HGB BLD-MCNC: 15.2 G/DL (ref 12.1–17)
IMM GRANULOCYTES # BLD AUTO: 0 K/UL (ref 0–0.04)
IMM GRANULOCYTES NFR BLD AUTO: 0 % (ref 0–0.5)
INR PPP: 3.4 (ref 0.9–1.1)
LYMPHOCYTES # BLD: 1.1 K/UL (ref 0.8–3.5)
LYMPHOCYTES NFR BLD: 20 % (ref 12–49)
MCH RBC QN AUTO: 31.4 PG (ref 26–34)
MCHC RBC AUTO-ENTMCNC: 33.5 G/DL (ref 30–36.5)
MCV RBC AUTO: 93.8 FL (ref 80–99)
MONOCYTES # BLD: 0.5 K/UL (ref 0–1)
MONOCYTES NFR BLD: 9 % (ref 5–13)
NEUTS SEG # BLD: 3.6 K/UL (ref 1.8–8)
NEUTS SEG NFR BLD: 67 % (ref 32–75)
NRBC # BLD: 0 K/UL (ref 0–0.01)
NRBC BLD-RTO: 0 PER 100 WBC
PLATELET # BLD AUTO: 126 K/UL (ref 150–400)
PMV BLD AUTO: 10.5 FL (ref 8.9–12.9)
POTASSIUM SERPL-SCNC: 4.4 MMOL/L (ref 3.5–5.1)
PROT SERPL-MCNC: 5.9 G/DL (ref 6.4–8.2)
PROTHROMBIN TIME: 32.4 SEC (ref 9–11.1)
RBC # BLD AUTO: 4.84 M/UL (ref 4.1–5.7)
SODIUM SERPL-SCNC: 139 MMOL/L (ref 136–145)
TROPONIN I SERPL HS-MCNC: 47 NG/L (ref 0–76)
WBC # BLD AUTO: 5.3 K/UL (ref 4.1–11.1)

## 2024-02-20 PROCEDURE — 85610 PROTHROMBIN TIME: CPT

## 2024-02-20 PROCEDURE — 93005 ELECTROCARDIOGRAM TRACING: CPT | Performed by: EMERGENCY MEDICINE

## 2024-02-20 PROCEDURE — 85025 COMPLETE CBC W/AUTO DIFF WBC: CPT

## 2024-02-20 PROCEDURE — 80053 COMPREHEN METABOLIC PANEL: CPT

## 2024-02-20 PROCEDURE — 99284 EMERGENCY DEPT VISIT MOD MDM: CPT

## 2024-02-20 PROCEDURE — 84484 ASSAY OF TROPONIN QUANT: CPT

## 2024-02-20 PROCEDURE — 36415 COLL VENOUS BLD VENIPUNCTURE: CPT

## 2024-02-20 PROCEDURE — 6370000000 HC RX 637 (ALT 250 FOR IP): Performed by: EMERGENCY MEDICINE

## 2024-02-20 PROCEDURE — 71250 CT THORAX DX C-: CPT

## 2024-02-20 RX ORDER — METOPROLOL SUCCINATE 25 MG/1
12.5 TABLET, EXTENDED RELEASE ORAL DAILY
COMMUNITY

## 2024-02-20 RX ORDER — LIDOCAINE 50 MG/G
1 PATCH TOPICAL DAILY
Qty: 10 PATCH | Refills: 0 | Status: SHIPPED | OUTPATIENT
Start: 2024-02-20 | End: 2024-03-01

## 2024-02-20 RX ORDER — LIDOCAINE 4 G/G
1 PATCH TOPICAL
Status: DISCONTINUED | OUTPATIENT
Start: 2024-02-20 | End: 2024-02-20 | Stop reason: HOSPADM

## 2024-02-20 RX ORDER — EPLERENONE 25 MG/1
25 TABLET, FILM COATED ORAL DAILY
COMMUNITY

## 2024-02-20 RX ORDER — DAPAGLIFLOZIN 10 MG/1
10 TABLET, FILM COATED ORAL EVERY MORNING
COMMUNITY

## 2024-02-20 ASSESSMENT — LIFESTYLE VARIABLES
HOW MANY STANDARD DRINKS CONTAINING ALCOHOL DO YOU HAVE ON A TYPICAL DAY: PATIENT DOES NOT DRINK
HOW OFTEN DO YOU HAVE A DRINK CONTAINING ALCOHOL: NEVER

## 2024-02-20 ASSESSMENT — PAIN DESCRIPTION - LOCATION: LOCATION: RIB CAGE

## 2024-02-20 ASSESSMENT — PAIN DESCRIPTION - ORIENTATION: ORIENTATION: RIGHT

## 2024-02-20 ASSESSMENT — PAIN DESCRIPTION - DESCRIPTORS: DESCRIPTORS: ACHING

## 2024-02-20 ASSESSMENT — PAIN SCALES - GENERAL: PAINLEVEL_OUTOF10: 1

## 2024-02-20 NOTE — DISCHARGE INSTRUCTIONS
Take Tylenol every 6-8 hours as needed for pain.  Use lidocaine patches as prescribed.    Your INR today was 3.4, please monitor closely to make sure that you do not become supratherapeutic.    Return to the emergency department for any new or worsening symptoms.  Follow-up with your primary care doctor.

## 2024-02-20 NOTE — ED NOTES
ED SIGN OUT NOTE  Care assumed at 3:00 PM    Patient was signed out to me by Dr. Block pending completion of workup including labs and CT of the chest.    Labs reviewed.  CBC without leukocytosis or anemia.  Mild thrombocytopenia with a platelet count of 126 K.  INR is 3.4, goal is 2.5-3.5.  BUN mildly elevated at 24 but creatinine normal.  Electrolytes stable.  CT of the chest is negative for acute fracture, shows a chronic right ninth rib fracture.  There is a 4.4 cm ascending aortic aneurysm, no significant change when compared to results from [facility] CT from 8/2022.    On reevaluation, patient resting comfortably and feeling well.  No complaints at this time.  Discussed results with patient and family at bedside.  Instructed on pain control for suspected chest contusion with use of Tylenol and lidocaine patches.  Follow-up needs return precautions given.    BP 99/66   Pulse 94   Temp 97.8 °F (36.6 °C) (Oral)   Resp 16   Ht 1.803 m (5' 11\")   Wt 79.4 kg (175 lb)   SpO2 99%   BMI 24.41 kg/m²     Labs Reviewed   COMPREHENSIVE METABOLIC PANEL - Abnormal; Notable for the following components:       Result Value    Glucose 104 (*)     BUN 24 (*)     Bun/Cre Ratio 21 (*)     Total Bilirubin 1.3 (*)     Alk Phosphatase 127 (*)     Total Protein 5.9 (*)     Albumin 3.0 (*)     Albumin/Globulin Ratio 1.0 (*)     All other components within normal limits   CBC WITH AUTO DIFFERENTIAL - Abnormal; Notable for the following components:    RDW 15.9 (*)     Platelets 126 (*)     All other components within normal limits   PROTIME-INR - Abnormal; Notable for the following components:    INR 3.4 (*)     Protime 32.4 (*)     All other components within normal limits   TROPONIN     CT CHEST WO CONTRAST   Final Result   1.  No acute fracture. Chronic right ninth rib fracture.   2.  4.4 cm ascending aortic aneurysm status post aortic valve replacement.          ED Course as of 02/20/24 1543   Tue Feb 20, 2024   1429 2:18 PM EKG

## 2024-02-20 NOTE — ED TRIAGE NOTES
Pt ambulated to the treatment area with a steady gait. Pt states \"I fell this morning hitting my right upper chest and skinned my right knee. I take coumadin I did not hit my head.\"

## 2024-02-20 NOTE — ED PROVIDER NOTES
are moist.   Eyes:      Extraocular Movements: Extraocular movements intact.      Conjunctiva/sclera: Conjunctivae normal.      Pupils: Pupils are equal, round, and reactive to light.   Cardiovascular:      Rate and Rhythm: Normal rate and regular rhythm.      Heart sounds: Normal heart sounds.      Comments: Pacemaker in place with well-healed sternotomy scar.  Pulmonary:      Effort: Pulmonary effort is normal.      Breath sounds: Normal breath sounds.   Chest:      Chest wall: Tenderness (Mild anterior chest wall tenderness on right without bony crepitus or deformity.  No clear skin injury.) present.   Abdominal:      General: There is no distension.      Palpations: Abdomen is soft.      Tenderness: There is no abdominal tenderness.   Musculoskeletal:         General: No tenderness.      Cervical back: Neck supple.   Skin:     General: Skin is warm and dry.   Neurological:      General: No focal deficit present.      Mental Status: He is alert and oriented to person, place, and time.             EMERGENCY DEPARTMENT COURSE and DIFFERENTIAL DIAGNOSIS/MDM:   Vitals:    Vitals:    02/20/24 1400   BP: 99/66   Pulse: 94   Resp: 16   Temp: 97.8 °F (36.6 °C)   TempSrc: Oral   SpO2: 99%   Weight: 79.4 kg (175 lb)   Height: 1.803 m (5' 11\")         Medical Decision Making    78-year-old male presents with trip and fall on chest and concern for rib fracture.  Significant cardiac history.  Is afebrile with vital signs stable no acute distress.    Labs and CT imaging ordered to further evaluate.    While awaiting these results his care was signed out to Dr. Gorman at 3 PM.  Please see her note for further information regarding the remainder of his care while in the ED.      REASSESSMENT     ED Course as of 02/20/24 1448 Tue Feb 20, 2024 1429 2:18 PM EKG shows ventricular paced rhythm but no clear acute ischemic changes. [WJ]      ED Course User Index  [WJ] Misha Block, DO         CONSULTS:  None    PROCEDURES:

## 2024-02-21 LAB
EKG ATRIAL RATE: 111 BPM
EKG DIAGNOSIS: NORMAL
EKG Q-T INTERVAL: 462 MS
EKG QRS DURATION: 164 MS
EKG QTC CALCULATION (BAZETT): 536 MS
EKG R AXIS: 149 DEGREES
EKG T AXIS: -39 DEGREES
EKG VENTRICULAR RATE: 81 BPM

## 2024-02-21 PROCEDURE — 93010 ELECTROCARDIOGRAM REPORT: CPT | Performed by: SPECIALIST

## 2024-06-28 ENCOUNTER — HOSPITAL ENCOUNTER (EMERGENCY)
Facility: HOSPITAL | Age: 79
Discharge: HOME | End: 2024-06-28
Attending: STUDENT IN AN ORGANIZED HEALTH CARE EDUCATION/TRAINING PROGRAM
Payer: MEDICARE

## 2024-06-28 VITALS
HEIGHT: 71 IN | WEIGHT: 174 LBS | RESPIRATION RATE: 20 BRPM | SYSTOLIC BLOOD PRESSURE: 105 MMHG | TEMPERATURE: 97.5 F | OXYGEN SATURATION: 100 % | HEART RATE: 102 BPM | BODY MASS INDEX: 24.36 KG/M2 | DIASTOLIC BLOOD PRESSURE: 63 MMHG

## 2024-06-28 DIAGNOSIS — L03.115 CELLULITIS OF RIGHT LOWER EXTREMITY: Primary | ICD-10-CM

## 2024-06-28 PROCEDURE — 99283 EMERGENCY DEPT VISIT LOW MDM: CPT

## 2024-06-28 RX ORDER — CEPHALEXIN 500 MG/1
500 CAPSULE ORAL 4 TIMES DAILY
Qty: 28 CAPSULE | Refills: 0 | Status: SHIPPED | OUTPATIENT
Start: 2024-06-28 | End: 2024-07-05

## 2024-06-28 ASSESSMENT — PAIN DESCRIPTION - PAIN TYPE: TYPE: ACUTE PAIN

## 2024-06-28 ASSESSMENT — PAIN DESCRIPTION - FREQUENCY: FREQUENCY: CONTINUOUS

## 2024-06-28 ASSESSMENT — PAIN - FUNCTIONAL ASSESSMENT: PAIN_FUNCTIONAL_ASSESSMENT: ACTIVITIES ARE NOT PREVENTED

## 2024-06-28 ASSESSMENT — PAIN DESCRIPTION - LOCATION: LOCATION: LEG

## 2024-06-28 ASSESSMENT — PAIN DESCRIPTION - ONSET: ONSET: SUDDEN

## 2024-06-28 ASSESSMENT — PAIN DESCRIPTION - ORIENTATION: ORIENTATION: RIGHT;LOWER

## 2024-06-28 ASSESSMENT — PAIN SCALES - GENERAL: PAINLEVEL_OUTOF10: 3

## 2024-06-28 ASSESSMENT — PAIN DESCRIPTION - DESCRIPTORS: DESCRIPTORS: ACHING

## 2024-06-28 NOTE — DISCHARGE INSTRUCTIONS
Please take the antibiotics as prescribed. Follow up with your primary care doctor. Return if worsening or concerning symptoms.

## 2024-06-28 NOTE — ED TRIAGE NOTES
Pt presents with RLE redness/swelling.  Approx one week ago was hit in his leg by a dog that was playing with his dog.

## 2024-06-28 NOTE — ED PROVIDER NOTES
[facility] EMERGENCY DEPT  EMERGENCY DEPARTMENT ENCOUNTER      Pt Name: Demario Ahumada  MRN: [id number]  Birthdate 1945  Date of evaluation: 6/28/2024  Provider: Anibal Rao DO    CHIEF COMPLAINT       Chief Complaint   Patient presents with    Leg Problem         HISTORY OF PRESENT ILLNESS   (Location/Symptom, Timing/Onset, Context/Setting, Quality, Duration, Modifying Factors, Severity)  Note limiting factors.   This is a 78-year-old male who presents to the ED for evaluation of right lower extremity anterior redness and swelling.  Patient reports that about a week ago he accidentally was hit in the leg by his dog.  Has a hematoma over this area and a small scab.  Its become warm and painful.  No history of DVT.            Review of External Medical Records:     Nursing Notes were reviewed.    REVIEW OF SYSTEMS    (2-9 systems for level 4, 10 or more for level 5)     Review of Systems   Respiratory:  Negative for shortness of breath.    Cardiovascular:  Negative for chest pain.   Skin:  Positive for wound.       Except as noted above the remainder of the review of systems was reviewed and negative.       PAST MEDICAL HISTORY     Past Medical History:   Diagnosis Date    Anticoagulated on Coumadin 11/27/2017    Arthritis     Atrial fibrillation (HCC)     PAF    Cardiomyopathy (HCC)     GI bleed 02/2017    HTN (hypertension)     Hypothyroidism     Melanoma in situ (HCC)     Orthostatic hypotension 11/29/2017    Pacemaker 11/28/2017    [employer] BiV ICD    S/P AVR (aortic valve replacement)     mechanical AVR    Syncope 2016    Systolic heart failure (HCC)     TIA (transient ischemic attack) 2017         SURGICAL HISTORY       Past Surgical History:   Procedure Laterality Date    AORTIC VALVE REPLACEMENT  2003    mechanical aortic valve     ICAR CATHETER ABLATION ATRIOVENTR NODE FUNCTION N/A 7/20/2020    ABLATION AV NODE performed by Ld Harman MD at [facility] CARDIAC CATH LAB    INTRACARDIAC

## 2024-08-23 NOTE — ED NOTES
Assumed care of pt. Pt resting in stretcher in low/locked position and call bell within reach. Introduced self as primary nurse. Discussed plan of care with patient. Opportunity to ask questions given. Patient advised that medical information will be discussed and it is their own responsibility to let nurse know if such conversation should not take place in presence of visitors. Pt verbalizes understanding. Pt. Denies any additional complaints at this time. Pt resting comfortably in bed at this time. 0 (no pain/absence of nonverbal indicators of pain)

## 2024-12-18 ENCOUNTER — HOSPITAL ENCOUNTER (EMERGENCY)
Facility: HOSPITAL | Age: 79
Discharge: HOME | End: 2024-12-18
Attending: EMERGENCY MEDICINE
Payer: MEDICARE

## 2024-12-18 VITALS
HEIGHT: 72 IN | DIASTOLIC BLOOD PRESSURE: 74 MMHG | HEART RATE: 98 BPM | BODY MASS INDEX: 23.7 KG/M2 | OXYGEN SATURATION: 100 % | WEIGHT: 175 LBS | SYSTOLIC BLOOD PRESSURE: 121 MMHG | TEMPERATURE: 97.7 F | RESPIRATION RATE: 18 BRPM

## 2024-12-18 DIAGNOSIS — M10.9 ACUTE GOUT INVOLVING TOE OF RIGHT FOOT, UNSPECIFIED CAUSE: Primary | ICD-10-CM

## 2024-12-18 LAB
ANION GAP SERPL CALC-SCNC: 8 MMOL/L (ref 2–12)
BUN SERPL-MCNC: 30 MG/DL (ref 6–20)
BUN/CREAT SERPL: 21 (ref 12–20)
CALCIUM SERPL-MCNC: 9.4 MG/DL (ref 8.5–10.1)
CHLORIDE SERPL-SCNC: 108 MMOL/L (ref 97–108)
CO2 SERPL-SCNC: 26 MMOL/L (ref 21–32)
CREAT SERPL-MCNC: 1.44 MG/DL (ref 0.7–1.3)
GLUCOSE SERPL-MCNC: 80 MG/DL (ref 65–100)
INR PPP: 3.3 (ref 0.9–1.1)
POTASSIUM SERPL-SCNC: 4.7 MMOL/L (ref 3.5–5.1)
PROTHROMBIN TIME: 30.8 SEC (ref 9–11.1)
SODIUM SERPL-SCNC: 142 MMOL/L (ref 136–145)

## 2024-12-18 PROCEDURE — 80048 BASIC METABOLIC PNL TOTAL CA: CPT

## 2024-12-18 PROCEDURE — 6370000000 HC RX 637 (ALT 250 FOR IP): Performed by: EMERGENCY MEDICINE

## 2024-12-18 PROCEDURE — 99283 EMERGENCY DEPT VISIT LOW MDM: CPT

## 2024-12-18 PROCEDURE — 85610 PROTHROMBIN TIME: CPT

## 2024-12-18 PROCEDURE — 36415 COLL VENOUS BLD VENIPUNCTURE: CPT

## 2024-12-18 RX ORDER — COLCHICINE 0.6 MG/1
TABLET ORAL
Qty: 9 TABLET | Refills: 0 | Status: SHIPPED | OUTPATIENT
Start: 2024-12-18

## 2024-12-18 RX ORDER — ACETAMINOPHEN 500 MG
1000 TABLET ORAL
Status: COMPLETED | OUTPATIENT
Start: 2024-12-18 | End: 2024-12-18

## 2024-12-18 RX ADMIN — ACETAMINOPHEN 1000 MG: 500 TABLET ORAL at 10:49

## 2024-12-18 ASSESSMENT — PAIN SCALES - GENERAL
PAINLEVEL_OUTOF10: 6
PAINLEVEL_OUTOF10: 7

## 2024-12-18 ASSESSMENT — PAIN DESCRIPTION - ORIENTATION
ORIENTATION: RIGHT
ORIENTATION: RIGHT

## 2024-12-18 ASSESSMENT — PAIN DESCRIPTION - ONSET: ONSET: AWAKENED FROM SLEEP

## 2024-12-18 ASSESSMENT — PAIN - FUNCTIONAL ASSESSMENT
PAIN_FUNCTIONAL_ASSESSMENT: ACTIVITIES ARE NOT PREVENTED
PAIN_FUNCTIONAL_ASSESSMENT: 0-10

## 2024-12-18 ASSESSMENT — PAIN DESCRIPTION - LOCATION
LOCATION: FOOT
LOCATION: FOOT

## 2024-12-18 ASSESSMENT — PAIN DESCRIPTION - DESCRIPTORS
DESCRIPTORS: SHARP
DESCRIPTORS: ACHING

## 2024-12-18 ASSESSMENT — PAIN DESCRIPTION - PAIN TYPE: TYPE: ACUTE PAIN

## 2024-12-18 NOTE — ED TRIAGE NOTES
Pt states that the ball of his right foot is painful out of no where starting yesterday, he states he drank 1/4 bottle of egg nog and is concerned for gout.

## 2024-12-18 NOTE — ED NOTES
Pt ambulates from treatment area. Discharge instructions given, education and medication reviewed. IV removed, belongings sent home with pt.

## 2024-12-18 NOTE — ED PROVIDER NOTES
[facility] EMERGENCY DEPT  EMERGENCY DEPARTMENT ENCOUNTER      Pt Name: Christian Amato  MRN: [id number]  Birthdate 1945  Date of evaluation: 12/18/2024  Provider: Clement Verma MD      HISTORY OF PRESENT ILLNESS      [facility]  Patient presenting due to concern for gout.  He has a history of A-fib on warfarin.  Symptoms started yesterday without trauma.  He endorses pain at the base of the right MTP joint.  He says prior to this he drank a quarter bottle of egg nog.  He has had gout in the past in similar locations and this pain feels the same.  Denies fevers or chills      Nursing Notes were reviewed.    REVIEW OF SYSTEMS         Review of Systems  All systems reviewed were negative unless otherwise document in the HPI      PAST MEDICAL HISTORY   No past medical history on file.      SURGICAL HISTORY     No past surgical history on file.      CURRENT MEDICATIONS       Previous Medications    No medications on file       ALLERGIES     Patient has no known allergies.    FAMILY HISTORY     No family history on file.       SOCIAL HISTORY       Social History     Socioeconomic History   • Marital status:          PHYSICAL EXAM       ED Triage Vitals   BP Systolic BP Percentile Diastolic BP Percentile Temp Temp Source Pulse Respirations SpO2   12/18/24 0902 -- -- 12/18/24 0907 12/18/24 0907 12/18/24 0902 12/18/24 0902 12/18/24 0902   (!) 91/52   97.7 °F (36.5 °C) Oral 98 18 100 %      Height Weight - Scale         12/18/24 0902 12/18/24 0902         1.829 m (6') 79.4 kg (175 lb)             Body mass index is 23.73 kg/m².    Physical Exam  Constitutional:       Comments: Sitting upright in no acute distress   Cardiovascular:      Comments: Regular rate and rhythm on exam.  Normal capillary refill in the distal aspect of the right great toe  Pulmonary:      Comments: Breathing comfortably on room air  Musculoskeletal:      Comments: Slight swelling of the right MTP joint with some faint erythema and tenderness.

## (undated) DEVICE — CATH DIAG WR5 EXPO 5FRX100CM -- EXPO

## (undated) DEVICE — REM POLYHESIVE ADULT PATIENT RETURN ELECTRODE: Brand: VALLEYLAB

## (undated) DEVICE — 3M™ MEDIPORE™ H SOFT CLOTH TAPE SHORT ROLL TAPE 6INCHES X 2 YARDS 16 ROLLS/CASE 2866S: Brand: 3M™ MEDIPORE™

## (undated) DEVICE — PATCH CARTO 3 EXT REF --

## (undated) DEVICE — STRAP,POSITIONING,KNEE/BODY,FOAM,4X60": Brand: MEDLINE

## (undated) DEVICE — PAD BD MATTRESS 73X32 IN STD CONVOLUTED FOAM LTX FREE

## (undated) DEVICE — TUBING PRSS MON L12IN PVC RIG NONEXPANDING M TO FEM CONN

## (undated) DEVICE — SUTURE DEV SZ 2-0 WND CLSR ABSRB GS-22 VLOC COVIDIEN VLOCM2145

## (undated) DEVICE — LAPAROSCOPIC TROCAR SLEEVE/SINGLE USE: Brand: KII® OPTICAL ACCESS SYSTEM

## (undated) DEVICE — SUTURE 1 STRATAFIX SYMMETRIC PDS + 30CM CT-1 SXPP1A435

## (undated) DEVICE — SYRINGE ANGIO 10ML RED FLAT GRP FIX M LUER CONN MEDALLION

## (undated) DEVICE — PROCEDURE KIT FLUID MGMT CUST MAINFOLD STRL

## (undated) DEVICE — CATH DIAG D 5F 155 MULTIPK X3 -- IMPULSE 16391-302

## (undated) DEVICE — COVER LT HNDL PLAS RIG 1 PER PK

## (undated) DEVICE — SUT ETHBND 0 36IN SH DA GRN --

## (undated) DEVICE — TOTAL TRAY, 16FR 10ML SIL FOLEY, URN: Brand: MEDLINE

## (undated) DEVICE — 3M™ IOBAN™ 2 ANTIMICROBIAL INCISE DRAPE 6650EZ: Brand: IOBAN™ 2

## (undated) DEVICE — TROCAR SITE CLOSURE DEVICE: Brand: ENDO CLOSE

## (undated) DEVICE — SUTURE MCRYL SZ 4-0 L27IN ABSRB UD L19MM PS-2 1/2 CIR PRIM Y426H

## (undated) DEVICE — STRIP,CLOSURE,WOUND,MEDI-STRIP,1/2X4: Brand: MEDLINE

## (undated) DEVICE — BLADELESS OBTURATOR: Brand: WECK VISTA

## (undated) DEVICE — PACK PROCEDURE SURG HRT CATH

## (undated) DEVICE — NEEDLE HYPO 22GA L1.5IN BLK S STL HUB POLYPR SHLD REG BVL

## (undated) DEVICE — SUTURE PDS II SZ 0 L27IN ABSRB VLT L36MM CT-1 1/2 CIR Z340H

## (undated) DEVICE — SOL IRRIGATION INJ NACL 0.9% 500ML BTL

## (undated) DEVICE — CATH NAVISTAR BIDIR FJ 8MM --

## (undated) DEVICE — SUPPORT WRST AD W3.5XL9IN DIA14.5IN ART SFT ADJ HK AND LOOP

## (undated) DEVICE — TOTAL TRAY, DB, 100% SILI FOLEY, 16FR 10: Brand: MEDLINE

## (undated) DEVICE — STERILE POLYISOPRENE POWDER-FREE SURGICAL GLOVES: Brand: PROTEXIS

## (undated) DEVICE — SUTURE ABSRB L30CM 2-0 VLT SPRL PDS + STRATAFIX SXPP1B410

## (undated) DEVICE — OBTRTR BLDELSS 8MM DISP -- DA VINCI - SNGL USE

## (undated) DEVICE — TIP COVER ACCESSORY

## (undated) DEVICE — CABLE CATH L10FT RED PIN CONN 25-34 FOR NAVISTAR CARTO 3

## (undated) DEVICE — HEMO INTRO 8.5F 60CM SR0 --

## (undated) DEVICE — SUTURE VCRL SZ 0 L18IN ABSRB UD POLYGLACTIN 910 BRAID TIE J912G

## (undated) DEVICE — AIRSEAL BIFURCATED SMOKE EVAC FILTERED TUBE SET: Brand: AIRSEAL

## (undated) DEVICE — GLIDESHEATH SLENDER ACCESS KIT: Brand: GLIDESHEATH SLENDER

## (undated) DEVICE — SUTURE STRATAFIX SPRL PDS + SZ 2-0 L6IN ABSRB VLT L36MM SXPP1B409

## (undated) DEVICE — INSUFFLATION NEEDLE TO ESTABLISH PNEUMOPERITONEUM.: Brand: INSUFFLATION NEEDLE

## (undated) DEVICE — SUTURE VCRL SZ 0 L27IN ABSRB VLT L36MM CT-1 1/2 CIR J340H

## (undated) DEVICE — DRAPE,REIN 53X77,STERILE: Brand: MEDLINE

## (undated) DEVICE — ROSEN CURVED WIRE GUIDE: Brand: ROSEN

## (undated) DEVICE — ARM DRAPE

## (undated) DEVICE — SYR MED COLOR CODED 3ML RED -- MEDALLION

## (undated) DEVICE — DEVICE TRNSF SPIK STL 2008S] MICROTEK MEDICAL INC]

## (undated) DEVICE — CANISTER, RIGID, 3000CC: Brand: MEDLINE INDUSTRIES, INC.

## (undated) DEVICE — TOWEL SURG W17XL27IN STD BLU COT NONFENESTRATED PREWASHED

## (undated) DEVICE — COVER,MAYO STAND,STERILE: Brand: MEDLINE

## (undated) DEVICE — PERCLOSE PROGLIDE™ SUTURE-MEDIATED CLOSURE SYSTEM: Brand: PERCLOSE PROGLIDE™

## (undated) DEVICE — NDL SPNE QNCKE 18GX3.5IN LF --

## (undated) DEVICE — INFECTION CONTROL KIT SYS

## (undated) DEVICE — TISSUE RETRIEVAL SYSTEM: Brand: INZII RETRIEVAL SYSTEM

## (undated) DEVICE — VISUALIZATION SYSTEM: Brand: CLEARIFY

## (undated) DEVICE — ELECTRO LUBE IS A SINGLE PATIENT USE DEVICE THAT IS INTENDED TO BE USED ON ELECTROSURGICAL ELECTRODES TO REDUCE STICKING.: Brand: KEY SURGICAL ELECTRO LUBE

## (undated) DEVICE — INSTRMT SET WND CLSR SUT PASS --

## (undated) DEVICE — BINDER ABD M/L H12IN FOR 46-62IN WHT 4 SLD PNL DSGN HOOP

## (undated) DEVICE — MEDI-TRACE CADENCE ADULT, DEFIBRILLATION ELECTRODE -RTS  (10 PR/PK) - PHYSIO-CONTROL: Brand: MEDI-TRACE CADENCE

## (undated) DEVICE — PAD,NON-ADHERENT,3X8,STERILE,LF,1/PK: Brand: MEDLINE

## (undated) DEVICE — 3M™ TEGADERM™ HP TRANSPARENT FILM DRESSING FRAME STYLE, 9536HP, 4 IN X 4-3/4 IN (10 CM X 12 CM), 50/CT 4CT/CASE: Brand: 3M™ TEGADERM™

## (undated) DEVICE — SEAL UNIV 5-8MM DISP BX/10 -- DA VINCI XI - SNGL USE

## (undated) DEVICE — (D)PREP SKN CHLRAPRP APPL 26ML -- CONVERT TO ITEM 371833

## (undated) DEVICE — SURGICAL PROCEDURE KIT GEN LAPAROSCOPY LF